# Patient Record
Sex: MALE | Race: WHITE | NOT HISPANIC OR LATINO | Employment: OTHER | URBAN - NONMETROPOLITAN AREA
[De-identification: names, ages, dates, MRNs, and addresses within clinical notes are randomized per-mention and may not be internally consistent; named-entity substitution may affect disease eponyms.]

---

## 2023-03-01 PROBLEM — I20.9 ANGINAL PAIN (CMS-HCC): Status: ACTIVE | Noted: 2023-03-01

## 2023-03-01 PROBLEM — R09.1 PLEURISY: Status: ACTIVE | Noted: 2023-03-01

## 2023-03-01 PROBLEM — K44.9 HIATAL HERNIA: Status: ACTIVE | Noted: 2023-03-01

## 2023-03-01 PROBLEM — M54.50 LOW BACK PAIN: Status: ACTIVE | Noted: 2023-03-01

## 2023-03-01 PROBLEM — I50.20 SYSTOLIC HEART FAILURE, ACC/AHA STAGE C (MULTI): Status: ACTIVE | Noted: 2023-03-01

## 2023-03-01 PROBLEM — R09.81 SINUS CONGESTION: Status: ACTIVE | Noted: 2023-03-01

## 2023-03-01 PROBLEM — R09.89 CAROTID BRUIT: Status: ACTIVE | Noted: 2023-03-01

## 2023-03-01 PROBLEM — M46.1 SACROILIITIS (CMS-HCC): Status: ACTIVE | Noted: 2023-03-01

## 2023-03-01 PROBLEM — M54.9 BACK PAIN: Status: ACTIVE | Noted: 2023-03-01

## 2023-03-01 PROBLEM — K51.90 ULCERATIVE COLITIS (MULTI): Status: ACTIVE | Noted: 2023-03-01

## 2023-03-01 PROBLEM — M25.552 LEFT HIP PAIN: Status: ACTIVE | Noted: 2023-03-01

## 2023-03-01 PROBLEM — K40.90 RIGHT INGUINAL HERNIA: Status: ACTIVE | Noted: 2023-03-01

## 2023-03-01 PROBLEM — M45.9 ANKYLOSING SPONDYLITIS (MULTI): Status: ACTIVE | Noted: 2023-03-01

## 2023-03-01 PROBLEM — M70.61 TROCHANTERIC BURSITIS OF BOTH HIPS: Status: ACTIVE | Noted: 2023-03-01

## 2023-03-01 PROBLEM — M47.812 CERVICAL SPONDYLOSIS: Status: ACTIVE | Noted: 2023-03-01

## 2023-03-01 PROBLEM — K64.4 ANAL SKIN TAG: Status: ACTIVE | Noted: 2023-03-01

## 2023-03-01 PROBLEM — M70.62 TROCHANTERIC BURSITIS OF BOTH HIPS: Status: ACTIVE | Noted: 2023-03-01

## 2023-03-01 PROBLEM — R17 YELLOW SKIN: Status: ACTIVE | Noted: 2023-03-01

## 2023-03-01 PROBLEM — T73.3XXA FATIGUE DUE TO EXCESSIVE EXERTION: Status: ACTIVE | Noted: 2023-03-01

## 2023-03-01 PROBLEM — E55.9 VITAMIN D DEFICIENCY: Status: ACTIVE | Noted: 2023-03-01

## 2023-03-01 PROBLEM — I35.1 MODERATE AORTIC REGURGITATION: Status: ACTIVE | Noted: 2023-03-01

## 2023-03-01 PROBLEM — K52.9 INFLAMMATORY BOWEL DISEASES (IBD): Status: ACTIVE | Noted: 2023-03-01

## 2023-03-01 PROBLEM — M16.11 ARTHRITIS OF RIGHT HIP: Status: ACTIVE | Noted: 2023-03-01

## 2023-03-01 PROBLEM — R94.39 ABNORMAL STRESS TEST: Status: ACTIVE | Noted: 2023-03-01

## 2023-03-01 PROBLEM — J44.1 COPD EXACERBATION (MULTI): Status: ACTIVE | Noted: 2023-03-01

## 2023-03-01 PROBLEM — G47.33 OBSTRUCTIVE SLEEP APNEA: Status: ACTIVE | Noted: 2023-03-01

## 2023-03-01 PROBLEM — J44.9 CHRONIC OBSTRUCTIVE PULMONARY DISEASE (MULTI): Status: ACTIVE | Noted: 2023-03-01

## 2023-03-01 PROBLEM — S32.050A COMPRESSION FRACTURE OF L5 VERTEBRA (MULTI): Status: ACTIVE | Noted: 2023-03-01

## 2023-03-01 PROBLEM — Z95.810 PRESENCE OF CARDIAC DEFIBRILLATOR: Status: ACTIVE | Noted: 2023-03-01

## 2023-03-01 PROBLEM — R93.2 ABNORMAL GALLBLADDER ULTRASOUND: Status: ACTIVE | Noted: 2023-03-01

## 2023-03-01 PROBLEM — E78.00 HYPERCHOLESTEROLEMIA: Status: ACTIVE | Noted: 2023-03-01

## 2023-03-01 PROBLEM — I50.20: Status: ACTIVE | Noted: 2023-03-01

## 2023-03-01 PROBLEM — S32.049A L4 VERTEBRAL FRACTURE (MULTI): Status: ACTIVE | Noted: 2023-03-01

## 2023-03-01 PROBLEM — I71.9 AORTIC ANEURYSM (CMS-HCC): Status: ACTIVE | Noted: 2023-03-01

## 2023-03-01 PROBLEM — K43.9 VENTRAL HERNIA: Status: ACTIVE | Noted: 2023-03-01

## 2023-03-01 PROBLEM — R05.9 COUGH: Status: ACTIVE | Noted: 2023-03-01

## 2023-03-01 PROBLEM — I25.5 ISCHEMIC CARDIOMYOPATHY: Status: ACTIVE | Noted: 2023-03-01

## 2023-03-01 PROBLEM — M62.08 RECTUS DIASTASIS: Status: ACTIVE | Noted: 2023-03-01

## 2023-03-01 PROBLEM — M41.20 IDIOPATHIC SCOLIOSIS IN ADULT PATIENT: Status: ACTIVE | Noted: 2023-03-01

## 2023-03-01 PROBLEM — R06.02 SOB (SHORTNESS OF BREATH) ON EXERTION: Status: ACTIVE | Noted: 2023-03-01

## 2023-03-01 PROBLEM — K21.9 GERD (GASTROESOPHAGEAL REFLUX DISEASE): Status: ACTIVE | Noted: 2023-03-01

## 2023-03-01 PROBLEM — I47.20 VENTRICULAR TACHYCARDIA (MULTI): Status: ACTIVE | Noted: 2023-03-01

## 2023-03-01 PROBLEM — I10 HTN (HYPERTENSION), BENIGN: Status: ACTIVE | Noted: 2023-03-01

## 2023-03-01 PROBLEM — R10.9 ABDOMINAL PAIN: Status: ACTIVE | Noted: 2023-03-01

## 2023-03-01 PROBLEM — M25.551 RIGHT HIP PAIN: Status: ACTIVE | Noted: 2023-03-01

## 2023-03-01 PROBLEM — M76.31 ILIOTIBIAL BAND SYNDROME OF RIGHT SIDE: Status: ACTIVE | Noted: 2023-03-01

## 2023-03-01 PROBLEM — M25.559 HIP PAIN: Status: ACTIVE | Noted: 2023-03-01

## 2023-03-01 PROBLEM — M48.02 DEGENERATIVE CERVICAL SPINAL STENOSIS: Status: ACTIVE | Noted: 2023-03-01

## 2023-03-01 PROBLEM — R09.89 CHEST CONGESTION: Status: ACTIVE | Noted: 2023-03-01

## 2023-03-01 PROBLEM — Z95.810 S/P ICD (INTERNAL CARDIAC DEFIBRILLATOR) PROCEDURE: Status: ACTIVE | Noted: 2023-03-01

## 2023-03-01 PROBLEM — I25.10 CAD (CORONARY ARTERY DISEASE): Status: ACTIVE | Noted: 2023-03-01

## 2023-03-01 PROBLEM — R73.01 FASTING HYPERGLYCEMIA: Status: ACTIVE | Noted: 2023-03-01

## 2023-03-01 RX ORDER — LIDOCAINE 50 MG/G
1 PATCH TOPICAL DAILY
COMMUNITY
End: 2024-04-17 | Stop reason: WASHOUT

## 2023-03-01 RX ORDER — CYCLOBENZAPRINE HCL 10 MG
10 TABLET ORAL 2 TIMES DAILY PRN
COMMUNITY
End: 2024-02-27 | Stop reason: WASHOUT

## 2023-03-01 RX ORDER — LANOLIN ALCOHOL/MO/W.PET/CERES
1 CREAM (GRAM) TOPICAL DAILY
COMMUNITY
Start: 2017-11-06

## 2023-03-01 RX ORDER — ALBUTEROL SULFATE 90 UG/1
2 AEROSOL, METERED RESPIRATORY (INHALATION) EVERY 6 HOURS
COMMUNITY
Start: 2016-04-29 | End: 2023-03-08 | Stop reason: SDUPTHER

## 2023-03-01 RX ORDER — ATORVASTATIN CALCIUM 80 MG/1
1 TABLET, FILM COATED ORAL NIGHTLY
COMMUNITY
Start: 2019-05-02 | End: 2024-02-26 | Stop reason: SDUPTHER

## 2023-03-01 RX ORDER — BUDESONIDE AND FORMOTEROL FUMARATE DIHYDRATE 160; 4.5 UG/1; UG/1
2 AEROSOL RESPIRATORY (INHALATION) 2 TIMES DAILY
COMMUNITY
Start: 2016-09-22 | End: 2024-04-17 | Stop reason: WASHOUT

## 2023-03-01 RX ORDER — SPIRONOLACTONE 25 MG/1
1 TABLET ORAL DAILY
COMMUNITY
Start: 2016-12-14

## 2023-03-01 RX ORDER — ALBUTEROL SULFATE 0.83 MG/ML
2.5 SOLUTION RESPIRATORY (INHALATION) EVERY 6 HOURS PRN
COMMUNITY
Start: 2022-02-07

## 2023-03-01 RX ORDER — GABAPENTIN 400 MG/1
400 CAPSULE ORAL 3 TIMES DAILY
COMMUNITY
End: 2024-02-27 | Stop reason: ALTCHOICE

## 2023-03-01 RX ORDER — POLYETHYLENE GLYCOL 3350 17 G/17G
17 POWDER, FOR SOLUTION ORAL AS NEEDED
COMMUNITY

## 2023-03-01 RX ORDER — DOCUSATE SODIUM 100 MG/1
1 CAPSULE, LIQUID FILLED ORAL 2 TIMES DAILY
COMMUNITY
Start: 2019-05-02 | End: 2023-09-06 | Stop reason: ALTCHOICE

## 2023-03-01 RX ORDER — TIOTROPIUM BROMIDE 18 UG/1
1 CAPSULE ORAL; RESPIRATORY (INHALATION) DAILY
COMMUNITY
Start: 2016-07-22

## 2023-03-01 RX ORDER — CETIRIZINE HYDROCHLORIDE 10 MG/1
1 TABLET ORAL DAILY
COMMUNITY

## 2023-03-01 RX ORDER — CALCIUM CARBONATE/VITAMIN D3 600MG-5MCG
1 TABLET ORAL DAILY
COMMUNITY
Start: 2019-05-02

## 2023-03-01 RX ORDER — TRIAMCINOLONE ACETONIDE 5 MG/G
1 CREAM TOPICAL 2 TIMES DAILY
COMMUNITY
Start: 2016-02-06 | End: 2024-02-27 | Stop reason: SDUPTHER

## 2023-03-01 RX ORDER — BUMETANIDE 1 MG/1
1 TABLET ORAL DAILY PRN
COMMUNITY
End: 2024-02-26 | Stop reason: SDUPTHER

## 2023-03-01 RX ORDER — CHOLECALCIFEROL (VITAMIN D3) 125 MCG
1 CAPSULE ORAL EVERY OTHER DAY
COMMUNITY

## 2023-03-01 RX ORDER — GUAIFENESIN 600 MG/1
1 TABLET, EXTENDED RELEASE ORAL AS NEEDED
COMMUNITY
Start: 2022-02-07

## 2023-03-01 RX ORDER — MULTIVITAMIN
1 TABLET ORAL DAILY
COMMUNITY

## 2023-03-01 RX ORDER — OMEPRAZOLE 20 MG/1
1 CAPSULE, DELAYED RELEASE ORAL DAILY
COMMUNITY
Start: 2016-02-05 | End: 2023-10-16 | Stop reason: SDUPTHER

## 2023-03-01 RX ORDER — NITROGLYCERIN 0.4 MG/1
1 TABLET SUBLINGUAL AS NEEDED
COMMUNITY
Start: 2019-05-02

## 2023-03-01 RX ORDER — LISINOPRIL 20 MG/1
1 TABLET ORAL DAILY
COMMUNITY
Start: 2021-10-26 | End: 2024-01-02 | Stop reason: SDUPTHER

## 2023-03-01 RX ORDER — L. ACIDOPHILUS/PECTIN, CITRUS 25MM-100MG
2 TABLET ORAL DAILY
COMMUNITY
Start: 2016-09-30 | End: 2023-10-16 | Stop reason: SDUPTHER

## 2023-03-01 RX ORDER — METOPROLOL SUCCINATE 100 MG/1
1 TABLET, EXTENDED RELEASE ORAL DAILY
COMMUNITY

## 2023-03-01 RX ORDER — CLOPIDOGREL BISULFATE 75 MG/1
1 TABLET ORAL DAILY
COMMUNITY
Start: 2019-05-02

## 2023-03-08 ENCOUNTER — OFFICE VISIT (OUTPATIENT)
Dept: PRIMARY CARE | Facility: CLINIC | Age: 63
End: 2023-03-08
Payer: MEDICARE

## 2023-03-08 VITALS
DIASTOLIC BLOOD PRESSURE: 76 MMHG | BODY MASS INDEX: 23.56 KG/M2 | SYSTOLIC BLOOD PRESSURE: 134 MMHG | WEIGHT: 138 LBS | HEART RATE: 80 BPM | HEIGHT: 64 IN

## 2023-03-08 DIAGNOSIS — M54.50 CHRONIC LOW BACK PAIN WITHOUT SCIATICA, UNSPECIFIED BACK PAIN LATERALITY: ICD-10-CM

## 2023-03-08 DIAGNOSIS — J44.9 CHRONIC OBSTRUCTIVE PULMONARY DISEASE, UNSPECIFIED COPD TYPE (MULTI): ICD-10-CM

## 2023-03-08 DIAGNOSIS — K51.00 ULCERATIVE PANCOLITIS WITHOUT COMPLICATION (MULTI): ICD-10-CM

## 2023-03-08 DIAGNOSIS — G89.29 CHRONIC LOW BACK PAIN WITHOUT SCIATICA, UNSPECIFIED BACK PAIN LATERALITY: ICD-10-CM

## 2023-03-08 DIAGNOSIS — M25.552 LEFT HIP PAIN: Primary | ICD-10-CM

## 2023-03-08 DIAGNOSIS — M45.0 ANKYLOSING SPONDYLITIS OF MULTIPLE SITES IN SPINE (MULTI): ICD-10-CM

## 2023-03-08 PROBLEM — I20.9 ANGINAL PAIN (CMS-HCC): Status: RESOLVED | Noted: 2023-03-01 | Resolved: 2023-03-08

## 2023-03-08 PROCEDURE — 99214 OFFICE O/P EST MOD 30 MIN: CPT | Performed by: INTERNAL MEDICINE

## 2023-03-08 PROCEDURE — 4004F PT TOBACCO SCREEN RCVD TLK: CPT | Performed by: INTERNAL MEDICINE

## 2023-03-08 PROCEDURE — 3075F SYST BP GE 130 - 139MM HG: CPT | Performed by: INTERNAL MEDICINE

## 2023-03-08 PROCEDURE — 3078F DIAST BP <80 MM HG: CPT | Performed by: INTERNAL MEDICINE

## 2023-03-08 RX ORDER — MINERAL OIL
180 ENEMA (ML) RECTAL ONCE AS NEEDED
COMMUNITY

## 2023-03-08 RX ORDER — TRIAMCINOLONE ACETONIDE 0.25 MG/G
OINTMENT TOPICAL
COMMUNITY
End: 2024-02-27 | Stop reason: WASHOUT

## 2023-03-08 RX ORDER — ERGOCALCIFEROL (VITAMIN D2) 10 MCG
5000 TABLET ORAL EVERY OTHER DAY
COMMUNITY
End: 2024-04-17 | Stop reason: SDUPTHER

## 2023-03-08 RX ORDER — PREDNISONE 10 MG/1
10 TABLET ORAL 3 TIMES DAILY
Qty: 15 TABLET | Refills: 0 | Status: SHIPPED | OUTPATIENT
Start: 2023-03-08 | End: 2023-03-13

## 2023-03-08 RX ORDER — ALBUTEROL SULFATE 90 UG/1
2 AEROSOL, METERED RESPIRATORY (INHALATION) EVERY 6 HOURS
Qty: 18 G | Refills: 11 | Status: SHIPPED | OUTPATIENT
Start: 2023-03-08

## 2023-03-08 RX ORDER — INFLIXIMAB 100 MG/10ML
100 INJECTION, POWDER, LYOPHILIZED, FOR SOLUTION INTRAVENOUS
COMMUNITY

## 2023-03-08 RX ORDER — AZITHROMYCIN 250 MG/1
250 TABLET, FILM COATED ORAL DAILY
COMMUNITY
Start: 2022-03-28 | End: 2023-09-13 | Stop reason: SDUPTHER

## 2023-03-08 RX ORDER — GABAPENTIN 300 MG/1
300 CAPSULE ORAL 3 TIMES DAILY
COMMUNITY
Start: 2023-02-15

## 2023-03-08 RX ORDER — PREDNISONE 10 MG/1
10 TABLET ORAL
COMMUNITY
Start: 2022-03-28 | End: 2023-03-08 | Stop reason: ALTCHOICE

## 2023-03-08 RX ORDER — DULOXETIN HYDROCHLORIDE 30 MG/1
30 CAPSULE, DELAYED RELEASE ORAL DAILY
COMMUNITY
Start: 2022-05-17 | End: 2024-02-27 | Stop reason: ALTCHOICE

## 2023-03-08 RX ORDER — SELENIUM 50 MCG
2 TABLET ORAL DAILY
COMMUNITY
End: 2024-02-27 | Stop reason: WASHOUT

## 2023-03-08 ASSESSMENT — ENCOUNTER SYMPTOMS
NUMBNESS: 0
SHORTNESS OF BREATH: 0
CONSTIPATION: 0
VOMITING: 0
LIGHT-HEADEDNESS: 0
DYSURIA: 0
MUSCULOSKELETAL NEGATIVE: 1
BACK PAIN: 0
ABDOMINAL PAIN: 0
HEMATOLOGIC/LYMPHATIC NEGATIVE: 1
ENDOCRINE NEGATIVE: 1
NECK STIFFNESS: 0
EYES NEGATIVE: 1
JOINT SWELLING: 0
PALPITATIONS: 0
CARDIOVASCULAR NEGATIVE: 1
PSYCHIATRIC NEGATIVE: 1
NAUSEA: 0
HEADACHES: 0
CONSTITUTIONAL NEGATIVE: 1
WHEEZING: 0
RESPIRATORY NEGATIVE: 1
ALLERGIC/IMMUNOLOGIC NEGATIVE: 1
AGITATION: 0
ABDOMINAL DISTENTION: 0
COUGH: 0
CHILLS: 0
NEUROLOGICAL NEGATIVE: 1
GASTROINTESTINAL NEGATIVE: 1
EYE DISCHARGE: 0
DIARRHEA: 0
ADENOPATHY: 0
CONFUSION: 0
FEVER: 0

## 2023-03-08 ASSESSMENT — PATIENT HEALTH QUESTIONNAIRE - PHQ9
1. LITTLE INTEREST OR PLEASURE IN DOING THINGS: NOT AT ALL
SUM OF ALL RESPONSES TO PHQ9 QUESTIONS 1 AND 2: 0
2. FEELING DOWN, DEPRESSED OR HOPELESS: NOT AT ALL

## 2023-03-08 NOTE — PROGRESS NOTES
Subjective   Patient ID: Horacio Gupta is a 62 y.o. male who presents for Follow-up (1 week follow up back pain and l hip bursitis ).  HERE FOR F/U STILL HAS LEFT HIP PAIN AND TAILBONE PAIN  THE LAST SHOT JUST HELPED FOR A FEW DAYS  RIGHT HIP IS OKNNOW        Review of Systems   Constitutional: Negative.  Negative for chills and fever.   HENT: Negative.  Negative for congestion.    Eyes: Negative.  Negative for discharge.   Respiratory: Negative.  Negative for cough, shortness of breath and wheezing.    Cardiovascular: Negative.  Negative for chest pain, palpitations and leg swelling.   Gastrointestinal: Negative.  Negative for abdominal distention, abdominal pain, constipation, diarrhea, nausea and vomiting.   Endocrine: Negative.    Genitourinary: Negative.  Negative for dysuria and urgency.   Musculoskeletal: Negative.  Negative for back pain, joint swelling and neck stiffness.   Skin: Negative.  Negative for rash.   Allergic/Immunologic: Negative.  Negative for immunocompromised state.   Neurological: Negative.  Negative for light-headedness, numbness and headaches.   Hematological: Negative.  Negative for adenopathy.   Psychiatric/Behavioral: Negative.  Negative for agitation, behavioral problems and confusion.    All other systems reviewed and are negative.      Objective   Physical Exam  Vitals reviewed.   Constitutional:       General: He is not in acute distress.     Appearance: Normal appearance.   HENT:      Head: Normocephalic and atraumatic.      Nose: Nose normal.   Eyes:      Conjunctiva/sclera: Conjunctivae normal.      Pupils: Pupils are equal, round, and reactive to light.   Neck:      Vascular: No carotid bruit.   Cardiovascular:      Rate and Rhythm: Normal rate and regular rhythm.      Pulses: Normal pulses.      Heart sounds:      No gallop.   Pulmonary:      Effort: Pulmonary effort is normal. No respiratory distress.      Breath sounds: Normal breath sounds. No wheezing.   Abdominal:  "     General: Bowel sounds are normal.      Palpations: Abdomen is soft.      Tenderness: There is no abdominal tenderness.   Musculoskeletal:         General: Tenderness present. No swelling. Normal range of motion.      Cervical back: Normal range of motion. No rigidity.      Comments: Tenderness left hip lateral and lower back   Lymphadenopathy:      Cervical: No cervical adenopathy.   Skin:     General: Skin is warm.      Findings: No rash.   Neurological:      General: No focal deficit present.      Mental Status: He is alert and oriented to person, place, and time.   Psychiatric:         Mood and Affect: Mood normal.         Behavior: Behavior normal.       /76 (BP Location: Left arm, Patient Position: Sitting)   Pulse 80   Ht 1.626 m (5' 4\")   Wt 62.6 kg (138 lb)   BMI 23.69 kg/m²    PSA   Date/Time Value Ref Range Status   01/31/2022 06:55 AM 1.29 0.00 - 4.00 ng/mL Final     Comment:     The FDA requires that the method used for PSA assay be   reported to the physician. Values obtained with different   assay methods must not be used interchangeably. This test  was performed at [facility] using the Access   Hybritech PSA assay is a two-site immunoenzymatic sandwich   assay. The assay is approved for measurement of   prostate-specific antigen (PSA)in serum and may be used   in conjunction with a digital rectal examination in men   50 years and older as an aid in detection of prostate   cancer.  5-Alpha-reductase inhibitors (e.g. Proscar, Finasteride,   Avodart, Dutasteride and Zahira) for the treatment of BPH   have been shown to lower PSA levels by an average of 50%   after 6 months of treatment.       Hemoglobin A1C   Date/Time Value Ref Range Status   08/17/2020 08:32 AM 6.3 % Final     Comment:          Diagnosis of Diabetes-Adults   Non-Diabetic: < or = 5.6%   Increased risk for developing diabetes: 5.7-6.4%   Diagnostic of diabetes: > or = 6.5%  .       Monitoring of Diabetes    "             Age (y)     Therapeutic Goal (%)   Adults:          >18           <7.0   Pediatrics:    13-18           <7.5                   7-12           <8.0                   0- 6            7.5-8.5   American Diabetes Association. Diabetes Care 33(S1), Jan 2010.       Assessment/Plan   Problem List Items Addressed This Visit          Respiratory    Chronic obstructive pulmonary disease (CMS/[facility])    Relevant Medications    albuterol 90 mcg/actuation inhaler    Other Relevant Orders    Follow Up In Primary Care       Digestive    Ulcerative colitis (CMS/[facility])     STABLE         Relevant Orders    Follow Up In Primary Care       Musculoskeletal    Ankylosing spondylitis (CMS/[facility])    Relevant Orders    Follow Up In Primary Care    Left hip pain - Primary    Relevant Medications    dexAMETHasone (Decadron) injection 4 mg (Start on 3/8/2023  4:00 PM)    predniSONE (Deltasone) 10 mg tablet    Other Relevant Orders    CT hip left wo IV contrast    Follow Up In Primary Care       Other    Low back pain    Relevant Medications    dexAMETHasone (Decadron) injection 4 mg (Start on 3/8/2023  4:00 PM)    predniSONE (Deltasone) 10 mg tablet    Other Relevant Orders    Follow Up In Primary Care      MDM    1) COMPLEXITY: 1 OR MORE CHRONIC CONDITION WITH EXACERBATION, OR PROGRESSION OR SIDE EFFECT OF TREATMENT ADDRESSED  2)DATA: TESTS INTERPRETED AND OR ORDERED, TOOK INDEPENDENT HISTORY OR RECORDS REVIEWED  3)RISK: MODERATE RISK DUE TO NATURE OF MEDICAL CONDITIONS/COMORBIDITY OR MEDICATIONS ORDERED OR SURGICAL OR PROCEDURE REFERRAL, .

## 2023-03-21 ENCOUNTER — OFFICE VISIT (OUTPATIENT)
Dept: PRIMARY CARE | Facility: CLINIC | Age: 63
End: 2023-03-21
Payer: MEDICARE

## 2023-03-21 VITALS
SYSTOLIC BLOOD PRESSURE: 114 MMHG | HEART RATE: 87 BPM | BODY MASS INDEX: 25.61 KG/M2 | WEIGHT: 150 LBS | DIASTOLIC BLOOD PRESSURE: 77 MMHG | HEIGHT: 64 IN

## 2023-03-21 DIAGNOSIS — J44.9 CHRONIC OBSTRUCTIVE PULMONARY DISEASE, UNSPECIFIED COPD TYPE (MULTI): ICD-10-CM

## 2023-03-21 DIAGNOSIS — M45.0 ANKYLOSING SPONDYLITIS OF MULTIPLE SITES IN SPINE (MULTI): ICD-10-CM

## 2023-03-21 DIAGNOSIS — G89.29 CHRONIC LOW BACK PAIN WITHOUT SCIATICA, UNSPECIFIED BACK PAIN LATERALITY: ICD-10-CM

## 2023-03-21 DIAGNOSIS — M25.552 LEFT HIP PAIN: ICD-10-CM

## 2023-03-21 DIAGNOSIS — K51.00 ULCERATIVE PANCOLITIS WITHOUT COMPLICATION (MULTI): ICD-10-CM

## 2023-03-21 DIAGNOSIS — J96.11 CHRONIC RESPIRATORY FAILURE WITH HYPOXIA (MULTI): Primary | ICD-10-CM

## 2023-03-21 DIAGNOSIS — M54.50 CHRONIC LOW BACK PAIN WITHOUT SCIATICA, UNSPECIFIED BACK PAIN LATERALITY: ICD-10-CM

## 2023-03-21 PROCEDURE — 99214 OFFICE O/P EST MOD 30 MIN: CPT | Performed by: INTERNAL MEDICINE

## 2023-03-21 PROCEDURE — 3074F SYST BP LT 130 MM HG: CPT | Performed by: INTERNAL MEDICINE

## 2023-03-21 PROCEDURE — 4004F PT TOBACCO SCREEN RCVD TLK: CPT | Performed by: INTERNAL MEDICINE

## 2023-03-21 PROCEDURE — 3078F DIAST BP <80 MM HG: CPT | Performed by: INTERNAL MEDICINE

## 2023-03-21 ASSESSMENT — ENCOUNTER SYMPTOMS
CONSTITUTIONAL NEGATIVE: 1
PALPITATIONS: 0
CONFUSION: 0
HEADACHES: 0
LIGHT-HEADEDNESS: 0
FEVER: 0
GASTROINTESTINAL NEGATIVE: 1
ABDOMINAL PAIN: 0
CONSTIPATION: 0
RESPIRATORY NEGATIVE: 1
DYSURIA: 0
ADENOPATHY: 0
ABDOMINAL DISTENTION: 0
JOINT SWELLING: 0
CARDIOVASCULAR NEGATIVE: 1
ENDOCRINE NEGATIVE: 1
ALLERGIC/IMMUNOLOGIC NEGATIVE: 1
SHORTNESS OF BREATH: 0
WHEEZING: 0
AGITATION: 0
PSYCHIATRIC NEGATIVE: 1
VOMITING: 0
MUSCULOSKELETAL NEGATIVE: 1
NEUROLOGICAL NEGATIVE: 1
NECK STIFFNESS: 0
COUGH: 0
EYE DISCHARGE: 0
DIARRHEA: 0
NAUSEA: 0
BACK PAIN: 0
EYES NEGATIVE: 1
NUMBNESS: 0
CHILLS: 0
HEMATOLOGIC/LYMPHATIC NEGATIVE: 1

## 2023-03-21 NOTE — PROGRESS NOTES
Subjective   Patient ID: Horacio Gupta is a 62 y.o. male who presents for Follow-up (2 WK FU PT CO LEFT HIP WANTS TO GIVE OUT).  HERE FOR FU FEELS BETTER, WALKS BETTER  GETTING PT AT HOME         Review of Systems   Constitutional: Negative.  Negative for chills and fever.   HENT: Negative.  Negative for congestion.    Eyes: Negative.  Negative for discharge.   Respiratory: Negative.  Negative for cough, shortness of breath and wheezing.    Cardiovascular: Negative.  Negative for chest pain, palpitations and leg swelling.   Gastrointestinal: Negative.  Negative for abdominal distention, abdominal pain, constipation, diarrhea, nausea and vomiting.   Endocrine: Negative.    Genitourinary: Negative.  Negative for dysuria and urgency.   Musculoskeletal: Negative.  Negative for back pain, joint swelling and neck stiffness.   Skin: Negative.  Negative for rash.   Allergic/Immunologic: Negative.  Negative for immunocompromised state.   Neurological: Negative.  Negative for light-headedness, numbness and headaches.   Hematological: Negative.  Negative for adenopathy.   Psychiatric/Behavioral: Negative.  Negative for agitation, behavioral problems and confusion.    All other systems reviewed and are negative.      Objective   Physical Exam  Vitals reviewed.   Constitutional:       General: He is not in acute distress.     Appearance: Normal appearance.   HENT:      Head: Normocephalic and atraumatic.      Nose: Nose normal.   Eyes:      Conjunctiva/sclera: Conjunctivae normal.      Pupils: Pupils are equal, round, and reactive to light.   Neck:      Vascular: No carotid bruit.   Cardiovascular:      Rate and Rhythm: Normal rate and regular rhythm.      Pulses: Normal pulses.      Heart sounds:      No gallop.   Pulmonary:      Effort: Pulmonary effort is normal. No respiratory distress.      Breath sounds: Normal breath sounds. No wheezing.   Abdominal:      General: Bowel sounds are normal.      Palpations: Abdomen  "is soft.      Tenderness: There is no abdominal tenderness.   Musculoskeletal:         General: Normal range of motion.      Cervical back: Normal range of motion. No rigidity.   Lymphadenopathy:      Cervical: No cervical adenopathy.   Skin:     General: Skin is warm.      Findings: No rash.   Neurological:      General: No focal deficit present.      Mental Status: He is alert and oriented to person, place, and time.   Psychiatric:         Mood and Affect: Mood normal.         Behavior: Behavior normal.       /77 (BP Location: Left arm, Patient Position: Sitting)   Pulse 87   Ht 1.626 m (5' 4\")   Wt 68 kg (150 lb)   BMI 25.75 kg/m²    PSA   Date/Time Value Ref Range Status   01/31/2022 06:55 AM 1.29 0.00 - 4.00 ng/mL Final     Comment:     The FDA requires that the method used for PSA assay be   reported to the physician. Values obtained with different   assay methods must not be used interchangeably. This test  was performed at [facility] using the Access   Hybritech PSA assay is a two-site immunoenzymatic sandwich   assay. The assay is approved for measurement of   prostate-specific antigen (PSA)in serum and may be used   in conjunction with a digital rectal examination in men   50 years and older as an aid in detection of prostate   cancer.  5-Alpha-reductase inhibitors (e.g. Proscar, Finasteride,   Avodart, Dutasteride and Zahira) for the treatment of BPH   have been shown to lower PSA levels by an average of 50%   after 6 months of treatment.       Hemoglobin A1C   Date/Time Value Ref Range Status   08/17/2020 08:32 AM 6.3 % Final     Comment:          Diagnosis of Diabetes-Adults   Non-Diabetic: < or = 5.6%   Increased risk for developing diabetes: 5.7-6.4%   Diagnostic of diabetes: > or = 6.5%  .       Monitoring of Diabetes                Age (y)     Therapeutic Goal (%)   Adults:          >18           <7.0   Pediatrics:    13-18           <7.5                   7-12           " <8.0                   0- 6            7.5-8.5   American Diabetes Association. Diabetes Care 33(S1), Jan 2010.       Assessment/Plan   Problem List Items Addressed This Visit          Respiratory    Chronic obstructive pulmonary disease (CMS/HCC)    Chronic respiratory failure with hypoxia (CMS/HCC) - Primary       Digestive    Ulcerative colitis (CMS/HCC)       Musculoskeletal    Ankylosing spondylitis (CMS/HCC)    Left hip pain    Relevant Orders    Referral to Orthopaedic Surgery       Other    Low back pain    CLINICALLY IMPROVING  CONT PT  ALL ASSESSED CHRONIC CONDITIONS ARE STABLE OR ADDRESSED.  CT CHANGES DISCUSSED WITH PT    MDM    1) COMPLEXITY: MORE THAN 1 STABLE CHRONIC CONDITION ADDRESSED  2)DATA: TESTS INTERPRETED AND OR ORDERED, TOOK INDEPENDENT HISTORY OR RECORDS REVIEWED  3)RISK: MODERATE RISK DUE TO NATURE OF MEDICAL CONDITIONS/COMORBIDITY OR MEDICATIONS ORDERED OR SURGICAL OR PROCEDURE REFERRAL, .

## 2023-04-03 ENCOUNTER — TELEPHONE (OUTPATIENT)
Dept: PRIMARY CARE | Facility: CLINIC | Age: 63
End: 2023-04-03
Payer: MEDICARE

## 2023-04-03 DIAGNOSIS — M45.9 ANKYLOSING SPONDYLITIS, UNSPECIFIED SITE OF SPINE (MULTI): Primary | ICD-10-CM

## 2023-04-18 ENCOUNTER — HOSPITAL ENCOUNTER (OUTPATIENT)
Dept: DATA CONVERSION | Facility: HOSPITAL | Age: 63
End: 2023-04-18
Attending: ANESTHESIOLOGY | Admitting: ANESTHESIOLOGY
Payer: MEDICARE

## 2023-04-18 DIAGNOSIS — M54.9 DORSALGIA, UNSPECIFIED: ICD-10-CM

## 2023-04-18 DIAGNOSIS — M54.17 RADICULOPATHY, LUMBOSACRAL REGION: ICD-10-CM

## 2023-04-18 DIAGNOSIS — F17.210 NICOTINE DEPENDENCE, CIGARETTES, UNCOMPLICATED: ICD-10-CM

## 2023-04-20 LAB
BASOPHILS (10*3/UL) IN BLOOD BY AUTOMATED COUNT: 0.04 X10E9/L (ref 0–0.1)
BASOPHILS/100 LEUKOCYTES IN BLOOD BY AUTOMATED COUNT: 0.5 % (ref 0–2)
C REACTIVE PROTEIN (MG/L) IN SER/PLAS: 0.93 MG/DL
EOSINOPHILS (10*3/UL) IN BLOOD BY AUTOMATED COUNT: 0.06 X10E9/L (ref 0–0.7)
EOSINOPHILS/100 LEUKOCYTES IN BLOOD BY AUTOMATED COUNT: 0.7 % (ref 0–6)
ERYTHROCYTE DISTRIBUTION WIDTH (RATIO) BY AUTOMATED COUNT: 14.2 % (ref 11.5–14.5)
ERYTHROCYTE MEAN CORPUSCULAR HEMOGLOBIN CONCENTRATION (G/DL) BY AUTOMATED: 32.1 G/DL (ref 32–36)
ERYTHROCYTE MEAN CORPUSCULAR VOLUME (FL) BY AUTOMATED COUNT: 99 FL (ref 80–100)
ERYTHROCYTES (10*6/UL) IN BLOOD BY AUTOMATED COUNT: 4.82 X10E12/L (ref 4.5–5.9)
HEMATOCRIT (%) IN BLOOD BY AUTOMATED COUNT: 47.7 % (ref 41–52)
HEMOGLOBIN (G/DL) IN BLOOD: 15.3 G/DL (ref 13.5–17.5)
IMMATURE GRANULOCYTES/100 LEUKOCYTES IN BLOOD BY AUTOMATED COUNT: 0.8 % (ref 0–0.9)
LEUKOCYTES (10*3/UL) IN BLOOD BY AUTOMATED COUNT: 8.5 X10E9/L (ref 4.4–11.3)
LYMPHOCYTES (10*3/UL) IN BLOOD BY AUTOMATED COUNT: 1.76 X10E9/L (ref 1.2–4.8)
LYMPHOCYTES/100 LEUKOCYTES IN BLOOD BY AUTOMATED COUNT: 20.8 % (ref 13–44)
MONOCYTES (10*3/UL) IN BLOOD BY AUTOMATED COUNT: 0.64 X10E9/L (ref 0.1–1)
MONOCYTES/100 LEUKOCYTES IN BLOOD BY AUTOMATED COUNT: 7.6 % (ref 2–10)
NEUTROPHILS (10*3/UL) IN BLOOD BY AUTOMATED COUNT: 5.89 X10E9/L (ref 1.2–7.7)
NEUTROPHILS/100 LEUKOCYTES IN BLOOD BY AUTOMATED COUNT: 69.6 % (ref 40–80)
PLATELETS (10*3/UL) IN BLOOD AUTOMATED COUNT: 257 X10E9/L (ref 150–450)
SEDIMENTATION RATE, ERYTHROCYTE: 19 MM/H (ref 0–20)

## 2023-05-15 ENCOUNTER — OFFICE VISIT (OUTPATIENT)
Dept: PRIMARY CARE | Facility: CLINIC | Age: 63
End: 2023-05-15
Payer: MEDICARE

## 2023-05-15 VITALS
SYSTOLIC BLOOD PRESSURE: 123 MMHG | WEIGHT: 153 LBS | HEART RATE: 82 BPM | DIASTOLIC BLOOD PRESSURE: 83 MMHG | BODY MASS INDEX: 26.12 KG/M2 | HEIGHT: 64 IN

## 2023-05-15 DIAGNOSIS — G89.29 CHRONIC LOW BACK PAIN WITHOUT SCIATICA, UNSPECIFIED BACK PAIN LATERALITY: ICD-10-CM

## 2023-05-15 DIAGNOSIS — M25.552 LEFT HIP PAIN: Primary | ICD-10-CM

## 2023-05-15 DIAGNOSIS — M54.50 CHRONIC LOW BACK PAIN WITHOUT SCIATICA, UNSPECIFIED BACK PAIN LATERALITY: ICD-10-CM

## 2023-05-15 DIAGNOSIS — M79.652 LEFT THIGH PAIN: ICD-10-CM

## 2023-05-15 DIAGNOSIS — Z00.00 HEALTHCARE MAINTENANCE: ICD-10-CM

## 2023-05-15 DIAGNOSIS — M45.0 ANKYLOSING SPONDYLITIS OF MULTIPLE SITES IN SPINE (MULTI): ICD-10-CM

## 2023-05-15 DIAGNOSIS — M70.62 TROCHANTERIC BURSITIS, LEFT HIP: ICD-10-CM

## 2023-05-15 PROCEDURE — 4004F PT TOBACCO SCREEN RCVD TLK: CPT | Performed by: INTERNAL MEDICINE

## 2023-05-15 PROCEDURE — 3079F DIAST BP 80-89 MM HG: CPT | Performed by: INTERNAL MEDICINE

## 2023-05-15 PROCEDURE — 3074F SYST BP LT 130 MM HG: CPT | Performed by: INTERNAL MEDICINE

## 2023-05-15 PROCEDURE — 99214 OFFICE O/P EST MOD 30 MIN: CPT | Performed by: INTERNAL MEDICINE

## 2023-05-15 RX ORDER — PNEUMOCOCCAL 20-VALENT CONJUGATE VACCINE 2.2; 2.2; 2.2; 2.2; 2.2; 2.2; 2.2; 2.2; 2.2; 2.2; 2.2; 2.2; 2.2; 2.2; 2.2; 2.2; 4.4; 2.2; 2.2; 2.2 UG/.5ML; UG/.5ML; UG/.5ML; UG/.5ML; UG/.5ML; UG/.5ML; UG/.5ML; UG/.5ML; UG/.5ML; UG/.5ML; UG/.5ML; UG/.5ML; UG/.5ML; UG/.5ML; UG/.5ML; UG/.5ML; UG/.5ML; UG/.5ML; UG/.5ML; UG/.5ML
0.5 INJECTION, SUSPENSION INTRAMUSCULAR ONCE
Qty: 0.5 ML | Refills: 0 | Status: SHIPPED | OUTPATIENT
Start: 2023-05-15 | End: 2023-05-15

## 2023-05-15 RX ORDER — DICLOFENAC SODIUM 10 MG/G
4 GEL TOPICAL 4 TIMES DAILY
Qty: 100 G | Refills: 3 | Status: SHIPPED | OUTPATIENT
Start: 2023-05-15 | End: 2023-08-09

## 2023-05-15 ASSESSMENT — PATIENT HEALTH QUESTIONNAIRE - PHQ9
2. FEELING DOWN, DEPRESSED OR HOPELESS: NOT AT ALL
SUM OF ALL RESPONSES TO PHQ9 QUESTIONS 1 AND 2: 0
1. LITTLE INTEREST OR PLEASURE IN DOING THINGS: NOT AT ALL

## 2023-05-15 ASSESSMENT — ENCOUNTER SYMPTOMS
BACK PAIN: 0
NAUSEA: 0
ABDOMINAL DISTENTION: 0
COUGH: 0
CHILLS: 0
ENDOCRINE NEGATIVE: 1
NEUROLOGICAL NEGATIVE: 1
FEVER: 0
JOINT SWELLING: 0
LIGHT-HEADEDNESS: 0
HEADACHES: 0
CONSTITUTIONAL NEGATIVE: 1
ALLERGIC/IMMUNOLOGIC NEGATIVE: 1
ADENOPATHY: 0
PALPITATIONS: 0
WHEEZING: 0
PSYCHIATRIC NEGATIVE: 1
ABDOMINAL PAIN: 0
SHORTNESS OF BREATH: 0
DYSURIA: 0
CONFUSION: 0
CARDIOVASCULAR NEGATIVE: 1
GASTROINTESTINAL NEGATIVE: 1
MUSCULOSKELETAL NEGATIVE: 1
NUMBNESS: 0
RESPIRATORY NEGATIVE: 1
CONSTIPATION: 0
NECK STIFFNESS: 0
EYES NEGATIVE: 1
EYE DISCHARGE: 0
HEMATOLOGIC/LYMPHATIC NEGATIVE: 1
AGITATION: 0
DIARRHEA: 0
VOMITING: 0

## 2023-05-15 NOTE — PROGRESS NOTES
Subjective   Patient ID: Horacio Gupta is a 62 y.o. male who presents for Follow-up (4 MO FU LAB NOT DONE).  HERE FOR FU  THE BACK PAIN IS BETTER  BUT STILL HAS LEFT HIP PAIN WITH RADIATION TO THE LEFT LE WITH NUMBNESS AND TINGLING        Review of Systems   Constitutional: Negative.  Negative for chills and fever.   HENT: Negative.  Negative for congestion.    Eyes: Negative.  Negative for discharge.   Respiratory: Negative.  Negative for cough, shortness of breath and wheezing.    Cardiovascular: Negative.  Negative for chest pain, palpitations and leg swelling.   Gastrointestinal: Negative.  Negative for abdominal distention, abdominal pain, constipation, diarrhea, nausea and vomiting.   Endocrine: Negative.    Genitourinary: Negative.  Negative for dysuria and urgency.   Musculoskeletal: Negative.  Negative for back pain, joint swelling and neck stiffness.   Skin: Negative.  Negative for rash.   Allergic/Immunologic: Negative.  Negative for immunocompromised state.   Neurological: Negative.  Negative for light-headedness, numbness and headaches.   Hematological: Negative.  Negative for adenopathy.   Psychiatric/Behavioral: Negative.  Negative for agitation, behavioral problems and confusion.    All other systems reviewed and are negative.      Objective   Physical Exam  Vitals reviewed.   Constitutional:       General: He is not in acute distress.     Appearance: Normal appearance.   HENT:      Head: Normocephalic and atraumatic.      Nose: Nose normal.   Eyes:      Conjunctiva/sclera: Conjunctivae normal.      Pupils: Pupils are equal, round, and reactive to light.   Neck:      Vascular: No carotid bruit.   Cardiovascular:      Rate and Rhythm: Normal rate and regular rhythm.      Pulses: Normal pulses.      Heart sounds:      No gallop.   Pulmonary:      Effort: Pulmonary effort is normal. No respiratory distress.      Breath sounds: Normal breath sounds. No wheezing.   Abdominal:      General: Bowel  "sounds are normal.      Palpations: Abdomen is soft.      Tenderness: There is no abdominal tenderness.   Musculoskeletal:         General: Tenderness present. Normal range of motion.      Cervical back: Normal range of motion. No rigidity.      Comments: LATERAL LEFT HIP AND LEFT THIGH   Lymphadenopathy:      Cervical: No cervical adenopathy.   Skin:     General: Skin is warm.      Findings: No rash.   Neurological:      General: No focal deficit present.      Mental Status: He is alert and oriented to person, place, and time.   Psychiatric:         Mood and Affect: Mood normal.         Behavior: Behavior normal.       /83 (BP Location: Left arm, Patient Position: Sitting)   Pulse 82   Ht 1.626 m (5' 4\")   Wt 69.4 kg (153 lb)   BMI 26.26 kg/m²    PSA   Date/Time Value Ref Range Status   01/31/2022 06:55 AM 1.29 0.00 - 4.00 ng/mL Final     Comment:     The FDA requires that the method used for PSA assay be   reported to the physician. Values obtained with different   assay methods must not be used interchangeably. This test  was performed at [facility] using the Access   Hybritech PSA assay is a two-site immunoenzymatic sandwich   assay. The assay is approved for measurement of   prostate-specific antigen (PSA)in serum and may be used   in conjunction with a digital rectal examination in men   50 years and older as an aid in detection of prostate   cancer.  5-Alpha-reductase inhibitors (e.g. Proscar, Finasteride,   Avodart, Dutasteride and Zahira) for the treatment of BPH   have been shown to lower PSA levels by an average of 50%   after 6 months of treatment.       Hemoglobin A1C   Date/Time Value Ref Range Status   08/17/2020 08:32 AM 6.3 % Final     Comment:          Diagnosis of Diabetes-Adults   Non-Diabetic: < or = 5.6%   Increased risk for developing diabetes: 5.7-6.4%   Diagnostic of diabetes: > or = 6.5%  .       Monitoring of Diabetes                Age (y)     Therapeutic Goal " (%)   Adults:          >18           <7.0   Pediatrics:    13-18           <7.5                   7-12           <8.0                   0- 6            7.5-8.5   American Diabetes Association. Diabetes Care 33(S1), Jan 2010.       Assessment/Plan   Problem List Items Addressed This Visit          Musculoskeletal    Ankylosing spondylitis (CMS/HCC)    Left hip pain - Primary    Relevant Medications    diclofenac sodium (Voltaren) 1 % gel gel       Other    Low back pain     Other Visit Diagnoses       Trochanteric bursitis, left hip        Relevant Medications    diclofenac sodium (Voltaren) 1 % gel gel    Left thigh pain        Relevant Medications    diclofenac sodium (Voltaren) 1 % gel gel    Healthcare maintenance        Relevant Medications    pneumoc 20-monalisa conj-dip cr,PF, (Prevnar 20, PF,) 0.5 mL vaccine             TRY V GEL    BONE SCAN DISCUSSED WITH  PT    REEVAL 1 MO      MDM    1) COMPLEXITY: 1 OR MORE CHRONIC CONDITION WITH EXACERBATION, OR PROGRESSION OR SIDE EFFECT OF TREATMENT ADDRESSED  2)DATA: TESTS INTERPRETED AND OR ORDERED, TOOK INDEPENDENT HISTORY OR RECORDS REVIEWED  3)RISK: MODERATE RISK DUE TO NATURE OF MEDICAL CONDITIONS/COMORBIDITY OR MEDICATIONS ORDERED OR SURGICAL OR PROCEDURE REFERRAL, .

## 2023-06-14 ENCOUNTER — OFFICE VISIT (OUTPATIENT)
Dept: PRIMARY CARE | Facility: CLINIC | Age: 63
End: 2023-06-14
Payer: MEDICARE

## 2023-06-14 VITALS
HEART RATE: 76 BPM | WEIGHT: 159 LBS | BODY MASS INDEX: 27.14 KG/M2 | SYSTOLIC BLOOD PRESSURE: 115 MMHG | DIASTOLIC BLOOD PRESSURE: 78 MMHG | HEIGHT: 64 IN

## 2023-06-14 DIAGNOSIS — M70.62 TROCHANTERIC BURSITIS OF BOTH HIPS: ICD-10-CM

## 2023-06-14 DIAGNOSIS — M45.0 ANKYLOSING SPONDYLITIS OF MULTIPLE SITES IN SPINE (MULTI): ICD-10-CM

## 2023-06-14 DIAGNOSIS — J96.11 CHRONIC RESPIRATORY FAILURE WITH HYPOXIA (MULTI): ICD-10-CM

## 2023-06-14 DIAGNOSIS — I10 HTN (HYPERTENSION), BENIGN: Primary | ICD-10-CM

## 2023-06-14 DIAGNOSIS — E55.9 VITAMIN D DEFICIENCY: ICD-10-CM

## 2023-06-14 DIAGNOSIS — I50.20 SYSTOLIC HEART FAILURE, ACC/AHA STAGE C (MULTI): ICD-10-CM

## 2023-06-14 DIAGNOSIS — R73.9 HYPERGLYCEMIA: ICD-10-CM

## 2023-06-14 DIAGNOSIS — M70.61 TROCHANTERIC BURSITIS OF BOTH HIPS: ICD-10-CM

## 2023-06-14 PROCEDURE — 3078F DIAST BP <80 MM HG: CPT | Performed by: INTERNAL MEDICINE

## 2023-06-14 PROCEDURE — 99214 OFFICE O/P EST MOD 30 MIN: CPT | Performed by: INTERNAL MEDICINE

## 2023-06-14 PROCEDURE — 4004F PT TOBACCO SCREEN RCVD TLK: CPT | Performed by: INTERNAL MEDICINE

## 2023-06-14 PROCEDURE — 3074F SYST BP LT 130 MM HG: CPT | Performed by: INTERNAL MEDICINE

## 2023-06-14 ASSESSMENT — ENCOUNTER SYMPTOMS
PSYCHIATRIC NEGATIVE: 1
WHEEZING: 0
ARTHRALGIAS: 1
JOINT SWELLING: 0
EYE DISCHARGE: 0
COUGH: 0
ENDOCRINE NEGATIVE: 1
NAUSEA: 0
PALPITATIONS: 0
CONFUSION: 0
CHILLS: 0
LIGHT-HEADEDNESS: 0
EYES NEGATIVE: 1
CONSTITUTIONAL NEGATIVE: 1
ABDOMINAL DISTENTION: 0
AGITATION: 0
NUMBNESS: 0
ABDOMINAL PAIN: 0
GASTROINTESTINAL NEGATIVE: 1
NEUROLOGICAL NEGATIVE: 1
BACK PAIN: 0
SHORTNESS OF BREATH: 0
DYSURIA: 0
FEVER: 0
NECK STIFFNESS: 0
HEMATOLOGIC/LYMPHATIC NEGATIVE: 1
CARDIOVASCULAR NEGATIVE: 1
CONSTIPATION: 0
VOMITING: 0
HEADACHES: 0
DIARRHEA: 0
RESPIRATORY NEGATIVE: 1
ALLERGIC/IMMUNOLOGIC NEGATIVE: 1
ADENOPATHY: 0

## 2023-06-14 ASSESSMENT — PATIENT HEALTH QUESTIONNAIRE - PHQ9
2. FEELING DOWN, DEPRESSED OR HOPELESS: NOT AT ALL
1. LITTLE INTEREST OR PLEASURE IN DOING THINGS: NOT AT ALL
SUM OF ALL RESPONSES TO PHQ9 QUESTIONS 1 AND 2: 0

## 2023-06-14 NOTE — PROGRESS NOTES
Subjective   Patient ID: Horacio Gupta is a 62 y.o. male who presents for Follow-up (1 MO FU TODAY).  HERE FOR FU  FEELS BETTER  VOLTAREN GEL HELPS A LOT        Review of Systems   Constitutional: Negative.  Negative for chills and fever.   HENT: Negative.  Negative for congestion.    Eyes: Negative.  Negative for discharge.   Respiratory: Negative.  Negative for cough, shortness of breath and wheezing.    Cardiovascular: Negative.  Negative for chest pain, palpitations and leg swelling.   Gastrointestinal: Negative.  Negative for abdominal distention, abdominal pain, constipation, diarrhea, nausea and vomiting.   Endocrine: Negative.    Genitourinary: Negative.  Negative for dysuria and urgency.   Musculoskeletal:  Positive for arthralgias. Negative for back pain, joint swelling and neck stiffness.   Skin: Negative.  Negative for rash.   Allergic/Immunologic: Negative.  Negative for immunocompromised state.   Neurological: Negative.  Negative for light-headedness, numbness and headaches.   Hematological: Negative.  Negative for adenopathy.   Psychiatric/Behavioral: Negative.  Negative for agitation, behavioral problems and confusion.    All other systems reviewed and are negative.      Objective   Physical Exam  Vitals reviewed.   Constitutional:       General: He is not in acute distress.     Appearance: Normal appearance.   HENT:      Head: Normocephalic and atraumatic.      Nose: Nose normal.   Eyes:      Conjunctiva/sclera: Conjunctivae normal.      Pupils: Pupils are equal, round, and reactive to light.   Neck:      Vascular: No carotid bruit.   Cardiovascular:      Rate and Rhythm: Normal rate and regular rhythm.      Pulses: Normal pulses.      Heart sounds:      No gallop.   Pulmonary:      Effort: Pulmonary effort is normal. No respiratory distress.      Breath sounds: Normal breath sounds. No wheezing.   Abdominal:      General: Bowel sounds are normal.      Palpations: Abdomen is soft.       "Tenderness: There is no abdominal tenderness.   Musculoskeletal:         General: Tenderness present. Normal range of motion.      Cervical back: Normal range of motion. No rigidity.      Right lower leg: Edema present.      Left lower leg: Edema present.      Comments: MILD EDEMA    LEFT HIP TENDERNESS   Lymphadenopathy:      Cervical: No cervical adenopathy.   Skin:     General: Skin is warm.      Findings: No rash.   Neurological:      General: No focal deficit present.      Mental Status: He is alert and oriented to person, place, and time.   Psychiatric:         Mood and Affect: Mood normal.         Behavior: Behavior normal.       /78 (BP Location: Left arm, Patient Position: Sitting)   Pulse 76   Ht 1.626 m (5' 4\")   Wt 72.1 kg (159 lb)   BMI 27.29 kg/m²    PSA   Date/Time Value Ref Range Status   01/31/2022 06:55 AM 1.29 0.00 - 4.00 ng/mL Final     Comment:     The FDA requires that the method used for PSA assay be   reported to the physician. Values obtained with different   assay methods must not be used interchangeably. This test  was performed at [facility] using the Access   Hybritech PSA assay is a two-site immunoenzymatic sandwich   assay. The assay is approved for measurement of   prostate-specific antigen (PSA)in serum and may be used   in conjunction with a digital rectal examination in men   50 years and older as an aid in detection of prostate   cancer.  5-Alpha-reductase inhibitors (e.g. Proscar, Finasteride,   Avodart, Dutasteride and Zahira) for the treatment of BPH   have been shown to lower PSA levels by an average of 50%   after 6 months of treatment.       Hemoglobin A1C   Date/Time Value Ref Range Status   08/17/2020 08:32 AM 6.3 % Final     Comment:          Diagnosis of Diabetes-Adults   Non-Diabetic: < or = 5.6%   Increased risk for developing diabetes: 5.7-6.4%   Diagnostic of diabetes: > or = 6.5%  .       Monitoring of Diabetes                Age (y)     " Therapeutic Goal (%)   Adults:          >18           <7.0   Pediatrics:    13-18           <7.5                   7-12           <8.0                   0- 6            7.5-8.5   American Diabetes Association. Diabetes Care 33(S1), Jan 2010.       Assessment/Plan   Problem List Items Addressed This Visit       Ankylosing spondylitis (CMS/HCC)    Relevant Orders    CBC and Auto Differential    Comprehensive Metabolic Panel    Sedimentation Rate    HTN (hypertension), benign - Primary    Relevant Orders    CBC and Auto Differential    Comprehensive Metabolic Panel    Systolic heart failure, ACC/AHA stage C (CMS/HCC)    Relevant Orders    CBC and Auto Differential    Comprehensive Metabolic Panel    Vitamin D deficiency    Relevant Orders    Vitamin D, Total    Trochanteric bursitis of both hips    Relevant Orders    CBC and Auto Differential    Comprehensive Metabolic Panel    Chronic respiratory failure with hypoxia (CMS/HCC)    Relevant Orders    CBC and Auto Differential    Comprehensive Metabolic Panel     Other Visit Diagnoses       Hyperglycemia        Relevant Orders    CBC and Auto Differential    Comprehensive Metabolic Panel    Hemoglobin A1C             CLINICALLY IMPROVING      MDM    1) COMPLEXITY: MORE THAN 1 STABLE CHRONIC CONDITION ADDRESSED  2)DATA: TESTS INTERPRETED AND OR ORDERED, TOOK INDEPENDENT HISTORY OR RECORDS REVIEWED  3)RISK: MODERATE RISK DUE TO NATURE OF MEDICAL CONDITIONS/COMORBIDITY OR MEDICATIONS ORDERED OR SURGICAL OR PROCEDURE REFERRAL, .

## 2023-08-09 DIAGNOSIS — M25.552 LEFT HIP PAIN: ICD-10-CM

## 2023-08-09 DIAGNOSIS — M79.652 LEFT THIGH PAIN: ICD-10-CM

## 2023-08-09 DIAGNOSIS — M70.62 TROCHANTERIC BURSITIS, LEFT HIP: ICD-10-CM

## 2023-08-09 RX ORDER — DICLOFENAC SODIUM 10 MG/G
GEL TOPICAL
Qty: 100 G | Refills: 0 | Status: SHIPPED | OUTPATIENT
Start: 2023-08-09 | End: 2024-02-27 | Stop reason: SDUPTHER

## 2023-09-06 ENCOUNTER — TELEMEDICINE (OUTPATIENT)
Dept: PRIMARY CARE | Facility: CLINIC | Age: 63
End: 2023-09-06
Payer: MEDICARE

## 2023-09-06 DIAGNOSIS — R06.02 SOB (SHORTNESS OF BREATH) ON EXERTION: ICD-10-CM

## 2023-09-06 DIAGNOSIS — R10.33 ABDOMINAL PAIN, PERIUMBILICAL: ICD-10-CM

## 2023-09-06 DIAGNOSIS — R51.9 ACUTE NONINTRACTABLE HEADACHE, UNSPECIFIED HEADACHE TYPE: ICD-10-CM

## 2023-09-06 DIAGNOSIS — J01.90 ACUTE NON-RECURRENT SINUSITIS, UNSPECIFIED LOCATION: ICD-10-CM

## 2023-09-06 DIAGNOSIS — R19.7 DIARRHEA, UNSPECIFIED TYPE: Primary | ICD-10-CM

## 2023-09-06 PROCEDURE — 99214 OFFICE O/P EST MOD 30 MIN: CPT | Performed by: INTERNAL MEDICINE

## 2023-09-06 RX ORDER — CETIRIZINE HYDROCHLORIDE 10 MG/1
10 TABLET ORAL ONCE AS NEEDED
COMMUNITY
Start: 2023-02-13 | End: 2024-04-17 | Stop reason: SDUPTHER

## 2023-09-06 RX ORDER — ALBUTEROL SULFATE 0.83 MG/ML
SOLUTION RESPIRATORY (INHALATION)
COMMUNITY
Start: 2023-02-13 | End: 2024-04-17 | Stop reason: SDUPTHER

## 2023-09-06 RX ORDER — METRONIDAZOLE 500 MG/1
500 TABLET ORAL 3 TIMES DAILY
Qty: 21 TABLET | Refills: 0 | Status: SHIPPED | OUTPATIENT
Start: 2023-09-06 | End: 2023-09-13

## 2023-09-06 RX ORDER — LORAZEPAM 1 MG/1
TABLET ORAL
COMMUNITY
Start: 2023-03-31 | End: 2024-02-27 | Stop reason: ALTCHOICE

## 2023-09-06 RX ORDER — METHYLPREDNISOLONE 4 MG/1
TABLET ORAL
Qty: 21 TABLET | Refills: 0 | Status: SHIPPED | OUTPATIENT
Start: 2023-09-06 | End: 2023-10-16 | Stop reason: ALTCHOICE

## 2023-09-06 RX ORDER — DOXYCYCLINE 100 MG/1
100 CAPSULE ORAL 2 TIMES DAILY
Qty: 14 CAPSULE | Refills: 0 | Status: SHIPPED | OUTPATIENT
Start: 2023-09-06 | End: 2023-09-13

## 2023-09-06 RX ORDER — TRAMADOL HYDROCHLORIDE 50 MG/1
TABLET ORAL
COMMUNITY
Start: 2023-03-31 | End: 2024-02-27 | Stop reason: ALTCHOICE

## 2023-09-06 ASSESSMENT — ENCOUNTER SYMPTOMS
ABDOMINAL PAIN: 1
COUGH: 0
WHEEZING: 0
FEVER: 0
DYSURIA: 0
WEAKNESS: 0
EYES NEGATIVE: 1
ABDOMINAL DISTENTION: 0
MUSCULOSKELETAL NEGATIVE: 1
NAUSEA: 0
DIZZINESS: 0
HEADACHES: 1
DIARRHEA: 1
CHILLS: 0
CONSTIPATION: 0
AGITATION: 0
PALPITATIONS: 0
ENDOCRINE NEGATIVE: 1
SHORTNESS OF BREATH: 1
PSYCHIATRIC NEGATIVE: 1
RHINORRHEA: 0
CARDIOVASCULAR NEGATIVE: 1
BACK PAIN: 0
LIGHT-HEADEDNESS: 0
VOMITING: 0
SINUS PRESSURE: 1

## 2023-09-06 NOTE — PROGRESS NOTES
Subjective   Patient ID: Horacio Gupta is a 62 y.o. male who presents for Shortness of Breath (Headache, runny nose, sinus congestion, sore throat, diarrhea, been using oxygen x 1 wk covid negative).  HPI  VIRTUAL VISIT  SOB ON EXERTION WITH SINUS CONGESTION AND HEADACHE 6/10 WITH DIARRHEA X 1 WEEK . HAS PERIUMBILICAL ABDOMINAL PAIN 4/10 ON AND OFF . HAD NEGATIVE HOME COVID TEST.  Review of Systems   Constitutional:  Negative for chills and fever.   HENT:  Positive for congestion and sinus pressure. Negative for postnasal drip and rhinorrhea.    Eyes: Negative.  Negative for visual disturbance.   Respiratory:  Positive for shortness of breath. Negative for cough and wheezing.    Cardiovascular: Negative.  Negative for chest pain, palpitations and leg swelling.   Gastrointestinal:  Positive for abdominal pain and diarrhea. Negative for abdominal distention, constipation, nausea and vomiting.        PERIUMBILICAL ABDOMINAL PAIN   Endocrine: Negative.    Genitourinary:  Negative for dysuria and urgency.   Musculoskeletal: Negative.  Negative for back pain.   Skin: Negative.  Negative for rash.   Allergic/Immunologic: Negative for immunocompromised state.   Neurological:  Positive for headaches. Negative for dizziness, weakness and light-headedness.   Psychiatric/Behavioral: Negative.  Negative for agitation.        Objective   Physical Exam  Constitutional:       General: He is not in acute distress.     Appearance: He is not ill-appearing.   Neurological:      Mental Status: He is alert.         Assessment/Plan   1. Diarrhea, unspecified type  methylPREDNISolone (Medrol Dospak) 4 mg tablets    metroNIDAZOLE (Flagyl) 500 mg tablet    Rotavirus antigen, stool    Cryptosporidium antigen, stool    C. difficile, PCR      2. Abdominal pain, periumbilical        3. Acute non-recurrent sinusitis, unspecified location  methylPREDNISolone (Medrol Dospak) 4 mg tablets    doxycycline (Vibramycin) 100 mg capsule      4.  Acute nonintractable headache, unspecified headache type  methylPREDNISolone (Medrol Dospak) 4 mg tablets      5. SOB (shortness of breath) on exertion          PT. WAS INSTRUCTED TO INCREASE FLUID INTAKE ,TAKE TYLENOL 650 MG PO Q6H/PRN FOR PAIN OR FEVER AND TAKE ROBITUSSIN OTC 2 TSP Q 6H/PRN FOR COUGH.'  ADVISED TO ELEVATE THE HEAD OF THE BED FOR SLEEP AND TO SLEEP WITH WARM HUMIDIFIER.  ADVISED FOR SMALLER MEAL PORTIONS MORE FREQUENT SERVINGS AND TO HAVE MORE GATORADE ,AND TO HAVE LOW FAT DIET. CUT BACK ON CAFFEINE.  ADVISED FOR FREQUENT HANDWASHING.    1 WEEK (VIRTUAL)  NEEDS STOOL CONTAINER (FAMILY WILL PICK IT UP).

## 2023-09-07 ENCOUNTER — LAB (OUTPATIENT)
Dept: LAB | Facility: LAB | Age: 63
End: 2023-09-07
Payer: MEDICARE

## 2023-09-07 VITALS — BODY MASS INDEX: 18.96 KG/M2 | WEIGHT: 120.81 LBS | HEIGHT: 67 IN

## 2023-09-07 DIAGNOSIS — R19.7 DIARRHEA, UNSPECIFIED TYPE: ICD-10-CM

## 2023-09-13 ENCOUNTER — TELEMEDICINE (OUTPATIENT)
Dept: PRIMARY CARE | Facility: CLINIC | Age: 63
End: 2023-09-13
Payer: MEDICARE

## 2023-09-13 DIAGNOSIS — R53.83 FATIGUE, UNSPECIFIED TYPE: ICD-10-CM

## 2023-09-13 DIAGNOSIS — E55.9 VITAMIN D DEFICIENCY: ICD-10-CM

## 2023-09-13 DIAGNOSIS — J44.1 COPD EXACERBATION (MULTI): Primary | ICD-10-CM

## 2023-09-13 PROCEDURE — 99214 OFFICE O/P EST MOD 30 MIN: CPT | Performed by: INTERNAL MEDICINE

## 2023-09-13 RX ORDER — AZITHROMYCIN 250 MG/1
250 TABLET, FILM COATED ORAL DAILY
Qty: 6 TABLET | Refills: 0 | Status: SHIPPED | OUTPATIENT
Start: 2023-09-13 | End: 2023-10-16 | Stop reason: ALTCHOICE

## 2023-09-13 RX ORDER — PREDNISONE 10 MG/1
10 TABLET ORAL 3 TIMES DAILY
Qty: 15 TABLET | Refills: 0 | Status: SHIPPED | OUTPATIENT
Start: 2023-09-13 | End: 2023-09-18

## 2023-09-13 ASSESSMENT — ENCOUNTER SYMPTOMS
FATIGUE: 1
ABDOMINAL DISTENTION: 0
NUMBNESS: 0
HEMATOLOGIC/LYMPHATIC NEGATIVE: 1
COUGH: 0
ALLERGIC/IMMUNOLOGIC NEGATIVE: 1
EYES NEGATIVE: 1
CONFUSION: 0
ENDOCRINE NEGATIVE: 1
NAUSEA: 0
DYSURIA: 0
SHORTNESS OF BREATH: 1
SINUS PAIN: 1
MUSCULOSKELETAL NEGATIVE: 1
CARDIOVASCULAR NEGATIVE: 1
ABDOMINAL PAIN: 0
BACK PAIN: 0
SINUS PRESSURE: 1
ADENOPATHY: 0
EYE DISCHARGE: 0
AGITATION: 0
JOINT SWELLING: 0
FEVER: 0
DIARRHEA: 0
RHINORRHEA: 1
CHILLS: 0
VOMITING: 0
PSYCHIATRIC NEGATIVE: 1
WHEEZING: 1
NEUROLOGICAL NEGATIVE: 1
HEADACHES: 0
CONSTIPATION: 0
LIGHT-HEADEDNESS: 0
NECK STIFFNESS: 0
PALPITATIONS: 0
GASTROINTESTINAL NEGATIVE: 1

## 2023-09-13 NOTE — PROGRESS NOTES
Subjective   Patient ID: Horacio Gupta is a 62 y.o. male who presents for Follow-up (1 WK FU PT CO FEELING VERY RUN DOWN AND HARD TO BREATH SINUS DRAINAGE DISCUSS STOOL SAMPLE AND WANTS REFERRAL FOR WATER THERAPY).  CO NASAL DRAINAGE, URENA, TREMOR, FATIGUE AND TIRED  NO FEVER HA  TESTED HIS SELF NEGATIVE FOR COVID        Review of Systems   Constitutional:  Positive for fatigue. Negative for chills and fever.   HENT:  Positive for rhinorrhea, sinus pressure and sinus pain. Negative for congestion.    Eyes: Negative.  Negative for discharge.   Respiratory:  Positive for shortness of breath (WITH EXERION) and wheezing. Negative for cough.    Cardiovascular: Negative.  Negative for chest pain, palpitations and leg swelling.   Gastrointestinal: Negative.  Negative for abdominal distention, abdominal pain, constipation, diarrhea, nausea and vomiting.   Endocrine: Negative.    Genitourinary: Negative.  Negative for dysuria and urgency.   Musculoskeletal: Negative.  Negative for back pain, joint swelling and neck stiffness.   Skin: Negative.  Negative for rash.   Allergic/Immunologic: Negative.  Negative for immunocompromised state.   Neurological: Negative.  Negative for light-headedness, numbness and headaches.   Hematological: Negative.  Negative for adenopathy.   Psychiatric/Behavioral: Negative.  Negative for agitation, behavioral problems and confusion.    All other systems reviewed and are negative.      Objective   Physical Exam  There were no vitals taken for this visit.   PSA   Date/Time Value Ref Range Status   01/31/2022 06:55 AM 1.29 0.00 - 4.00 ng/mL Final     Comment:     The FDA requires that the method used for PSA assay be   reported to the physician. Values obtained with different   assay methods must not be used interchangeably. This test  was performed at [facility] using the Access   Hybritech PSA assay is a two-site immunoenzymatic sandwich   assay. The assay is approved for  measurement of   prostate-specific antigen (PSA)in serum and may be used   in conjunction with a digital rectal examination in men   50 years and older as an aid in detection of prostate   cancer.  5-Alpha-reductase inhibitors (e.g. Proscar, Finasteride,   Avodart, Dutasteride and Zahira) for the treatment of BPH   have been shown to lower PSA levels by an average of 50%   after 6 months of treatment.       Hemoglobin A1C   Date/Time Value Ref Range Status   08/17/2020 08:32 AM 6.3 % Final     Comment:          Diagnosis of Diabetes-Adults   Non-Diabetic: < or = 5.6%   Increased risk for developing diabetes: 5.7-6.4%   Diagnostic of diabetes: > or = 6.5%  .       Monitoring of Diabetes                Age (y)     Therapeutic Goal (%)   Adults:          >18           <7.0   Pediatrics:    13-18           <7.5                   7-12           <8.0                   0- 6            7.5-8.5   American Diabetes Association. Diabetes Care 33(S1), Jan 2010.       Assessment/Plan   Problem List Items Addressed This Visit       COPD exacerbation (CMS/[facility]) - Primary    Relevant Medications    azithromycin (Zithromax) 250 mg tablet    predniSONE (Deltasone) 10 mg tablet    Other Relevant Orders    CBC and Auto Differential    Comprehensive Metabolic Panel    TSH with reflex to Free T4 if abnormal    XR chest 2 views    Vitamin D deficiency    Relevant Orders    Vitamin D 25-Hydroxy,Total (for eval of Vitamin D levels)     Other Visit Diagnoses       Fatigue, unspecified type        Relevant Orders    TSH with reflex to Free T4 if abnormal             PT. WAS INSTRUCTED TO INCREASE FLUID INTAKE ,TAKE TYLENOL 650 MG PO Q6H/PRN FOR PAIN OR FEVER AND TAKE ROBITUSSIN OTC 2 TSP Q 6H/PRN FOR COUGH.      MDM    1) COMPLEXITY: 1 OR MORE CHRONIC CONDITION WITH EXACERBATION, OR PROGRESSION OR SIDE EFFECT OF TREATMENT ADDRESSED  2)DATA: TESTS INTERPRETED AND OR ORDERED, TOOK INDEPENDENT HISTORY OR RECORDS REVIEWED  3)RISK: MODERATE RISK DUE  TO NATURE OF MEDICAL CONDITIONS/COMORBIDITY OR MEDICATIONS ORDERED OR SURGICAL OR PROCEDURE REFERRAL, .      FU 1 WEEK IN OFFICE

## 2023-09-14 ENCOUNTER — LAB (OUTPATIENT)
Dept: LAB | Facility: LAB | Age: 63
End: 2023-09-14
Payer: MEDICARE

## 2023-09-14 DIAGNOSIS — J44.1 COPD EXACERBATION (MULTI): ICD-10-CM

## 2023-09-14 DIAGNOSIS — E55.9 VITAMIN D DEFICIENCY: ICD-10-CM

## 2023-09-14 DIAGNOSIS — R53.83 FATIGUE, UNSPECIFIED TYPE: ICD-10-CM

## 2023-09-14 LAB
ALANINE AMINOTRANSFERASE (SGPT) (U/L) IN SER/PLAS: 30 U/L (ref 10–52)
ALBUMIN (G/DL) IN SER/PLAS: 4.1 G/DL (ref 3.4–5)
ALKALINE PHOSPHATASE (U/L) IN SER/PLAS: 49 U/L (ref 33–136)
ANION GAP IN SER/PLAS: 12 MMOL/L (ref 10–20)
ASPARTATE AMINOTRANSFERASE (SGOT) (U/L) IN SER/PLAS: 30 U/L (ref 9–39)
BASOPHILS (10*3/UL) IN BLOOD BY AUTOMATED COUNT: 0.03 X10E9/L (ref 0–0.1)
BASOPHILS/100 LEUKOCYTES IN BLOOD BY AUTOMATED COUNT: 0.3 % (ref 0–2)
BILIRUBIN TOTAL (MG/DL) IN SER/PLAS: 0.4 MG/DL (ref 0–1.2)
CALCIDIOL (25 OH VITAMIN D3) (NG/ML) IN SER/PLAS: 61 NG/ML
CALCIUM (MG/DL) IN SER/PLAS: 9.3 MG/DL (ref 8.6–10.3)
CARBON DIOXIDE, TOTAL (MMOL/L) IN SER/PLAS: 26 MMOL/L (ref 21–32)
CHLORIDE (MMOL/L) IN SER/PLAS: 103 MMOL/L (ref 98–107)
CREATININE (MG/DL) IN SER/PLAS: 0.86 MG/DL (ref 0.5–1.3)
EOSINOPHILS (10*3/UL) IN BLOOD BY AUTOMATED COUNT: 0.04 X10E9/L (ref 0–0.7)
EOSINOPHILS/100 LEUKOCYTES IN BLOOD BY AUTOMATED COUNT: 0.5 % (ref 0–6)
ERYTHROCYTE DISTRIBUTION WIDTH (RATIO) BY AUTOMATED COUNT: 14.3 % (ref 11.5–14.5)
ERYTHROCYTE MEAN CORPUSCULAR HEMOGLOBIN CONCENTRATION (G/DL) BY AUTOMATED: 33.3 G/DL (ref 32–36)
ERYTHROCYTE MEAN CORPUSCULAR VOLUME (FL) BY AUTOMATED COUNT: 97 FL (ref 80–100)
ERYTHROCYTES (10*6/UL) IN BLOOD BY AUTOMATED COUNT: 5.08 X10E12/L (ref 4.5–5.9)
GFR MALE: >90 ML/MIN/1.73M2
GLUCOSE (MG/DL) IN SER/PLAS: 97 MG/DL (ref 74–99)
HEMATOCRIT (%) IN BLOOD BY AUTOMATED COUNT: 49.5 % (ref 41–52)
HEMOGLOBIN (G/DL) IN BLOOD: 16.5 G/DL (ref 13.5–17.5)
IMMATURE GRANULOCYTES/100 LEUKOCYTES IN BLOOD BY AUTOMATED COUNT: 0.6 % (ref 0–0.9)
LEUKOCYTES (10*3/UL) IN BLOOD BY AUTOMATED COUNT: 8.8 X10E9/L (ref 4.4–11.3)
LYMPHOCYTES (10*3/UL) IN BLOOD BY AUTOMATED COUNT: 1.98 X10E9/L (ref 1.2–4.8)
LYMPHOCYTES/100 LEUKOCYTES IN BLOOD BY AUTOMATED COUNT: 22.5 % (ref 13–44)
MONOCYTES (10*3/UL) IN BLOOD BY AUTOMATED COUNT: 0.84 X10E9/L (ref 0.1–1)
MONOCYTES/100 LEUKOCYTES IN BLOOD BY AUTOMATED COUNT: 9.6 % (ref 2–10)
NEUTROPHILS (10*3/UL) IN BLOOD BY AUTOMATED COUNT: 5.85 X10E9/L (ref 1.2–7.7)
NEUTROPHILS/100 LEUKOCYTES IN BLOOD BY AUTOMATED COUNT: 66.5 % (ref 40–80)
PLATELETS (10*3/UL) IN BLOOD AUTOMATED COUNT: 273 X10E9/L (ref 150–450)
POTASSIUM (MMOL/L) IN SER/PLAS: 4.5 MMOL/L (ref 3.5–5.3)
PROTEIN TOTAL: 6 G/DL (ref 6.4–8.2)
SODIUM (MMOL/L) IN SER/PLAS: 136 MMOL/L (ref 136–145)
THYROTROPIN (MIU/L) IN SER/PLAS BY DETECTION LIMIT <= 0.05 MIU/L: 1.68 MIU/L (ref 0.44–3.98)
UREA NITROGEN (MG/DL) IN SER/PLAS: 11 MG/DL (ref 6–23)

## 2023-09-14 PROCEDURE — 85025 COMPLETE CBC W/AUTO DIFF WBC: CPT

## 2023-09-14 PROCEDURE — 82306 VITAMIN D 25 HYDROXY: CPT

## 2023-09-14 PROCEDURE — 84443 ASSAY THYROID STIM HORMONE: CPT

## 2023-09-14 PROCEDURE — 36415 COLL VENOUS BLD VENIPUNCTURE: CPT

## 2023-09-14 PROCEDURE — 80053 COMPREHEN METABOLIC PANEL: CPT

## 2023-09-19 ENCOUNTER — OFFICE VISIT (OUTPATIENT)
Dept: PRIMARY CARE | Facility: CLINIC | Age: 63
End: 2023-09-19
Payer: MEDICARE

## 2023-09-19 VITALS
HEART RATE: 76 BPM | BODY MASS INDEX: 25.61 KG/M2 | HEIGHT: 64 IN | DIASTOLIC BLOOD PRESSURE: 67 MMHG | WEIGHT: 150 LBS | SYSTOLIC BLOOD PRESSURE: 107 MMHG

## 2023-09-19 DIAGNOSIS — Z00.00 HEALTHCARE MAINTENANCE: ICD-10-CM

## 2023-09-19 DIAGNOSIS — J44.9 CHRONIC OBSTRUCTIVE PULMONARY DISEASE, UNSPECIFIED COPD TYPE (MULTI): ICD-10-CM

## 2023-09-19 DIAGNOSIS — M45.0 ANKYLOSING SPONDYLITIS OF MULTIPLE SITES IN SPINE (MULTI): Primary | ICD-10-CM

## 2023-09-19 DIAGNOSIS — Z79.899 MEDICATION MANAGEMENT: ICD-10-CM

## 2023-09-19 DIAGNOSIS — I50.20 SYSTOLIC HEART FAILURE, ACC/AHA STAGE C (MULTI): ICD-10-CM

## 2023-09-19 DIAGNOSIS — I10 HTN (HYPERTENSION), BENIGN: ICD-10-CM

## 2023-09-19 DIAGNOSIS — J96.11 CHRONIC RESPIRATORY FAILURE WITH HYPOXIA (MULTI): ICD-10-CM

## 2023-09-19 DIAGNOSIS — E55.9 VITAMIN D DEFICIENCY: ICD-10-CM

## 2023-09-19 DIAGNOSIS — E78.00 HYPERCHOLESTEROLEMIA: ICD-10-CM

## 2023-09-19 PROCEDURE — G0008 ADMIN INFLUENZA VIRUS VAC: HCPCS | Performed by: INTERNAL MEDICINE

## 2023-09-19 PROCEDURE — 3074F SYST BP LT 130 MM HG: CPT | Performed by: INTERNAL MEDICINE

## 2023-09-19 PROCEDURE — 3078F DIAST BP <80 MM HG: CPT | Performed by: INTERNAL MEDICINE

## 2023-09-19 PROCEDURE — 90662 IIV NO PRSV INCREASED AG IM: CPT | Performed by: INTERNAL MEDICINE

## 2023-09-19 PROCEDURE — 99214 OFFICE O/P EST MOD 30 MIN: CPT | Performed by: INTERNAL MEDICINE

## 2023-09-19 PROCEDURE — 4004F PT TOBACCO SCREEN RCVD TLK: CPT | Performed by: INTERNAL MEDICINE

## 2023-09-19 RX ORDER — PREDNISONE 10 MG/1
10 TABLET ORAL 3 TIMES DAILY
Qty: 15 TABLET | Refills: 0 | Status: SHIPPED | OUTPATIENT
Start: 2023-09-19 | End: 2023-09-24

## 2023-09-19 RX ORDER — AZITHROMYCIN 250 MG/1
TABLET, FILM COATED ORAL
Qty: 6 TABLET | Refills: 0 | Status: SHIPPED | OUTPATIENT
Start: 2023-09-19 | End: 2023-09-24

## 2023-09-19 ASSESSMENT — ENCOUNTER SYMPTOMS
LIGHT-HEADEDNESS: 0
PALPITATIONS: 0
JOINT SWELLING: 0
CONFUSION: 0
HEMATOLOGIC/LYMPHATIC NEGATIVE: 1
CONSTITUTIONAL NEGATIVE: 1
DIARRHEA: 0
DYSURIA: 0
NUMBNESS: 0
AGITATION: 0
ALLERGIC/IMMUNOLOGIC NEGATIVE: 1
ABDOMINAL PAIN: 0
MUSCULOSKELETAL NEGATIVE: 1
NAUSEA: 0
VOMITING: 0
FEVER: 0
ADENOPATHY: 0
NEUROLOGICAL NEGATIVE: 1
GASTROINTESTINAL NEGATIVE: 1
EYE DISCHARGE: 0
ABDOMINAL DISTENTION: 0
PSYCHIATRIC NEGATIVE: 1
SHORTNESS OF BREATH: 0
ENDOCRINE NEGATIVE: 1
CARDIOVASCULAR NEGATIVE: 1
HEADACHES: 0
NECK STIFFNESS: 0
CONSTIPATION: 0
RESPIRATORY NEGATIVE: 1
COUGH: 0
CHILLS: 0
WHEEZING: 0
EYES NEGATIVE: 1
BACK PAIN: 0

## 2023-09-19 NOTE — PROGRESS NOTES
Subjective   Patient ID: Horacio Gupta is a 62 y.o. male who presents for Follow-up (Fu xrays and labs).  Here for fu with labs feels fine    Wants z pac and prednisone in case        Review of Systems   Constitutional: Negative.  Negative for chills and fever.   HENT: Negative.  Negative for congestion.    Eyes: Negative.  Negative for discharge.   Respiratory: Negative.  Negative for cough, shortness of breath and wheezing.    Cardiovascular: Negative.  Negative for chest pain, palpitations and leg swelling.   Gastrointestinal: Negative.  Negative for abdominal distention, abdominal pain, constipation, diarrhea, nausea and vomiting.   Endocrine: Negative.    Genitourinary: Negative.  Negative for dysuria and urgency.   Musculoskeletal: Negative.  Negative for back pain, joint swelling and neck stiffness.   Skin: Negative.  Negative for rash.   Allergic/Immunologic: Negative.  Negative for immunocompromised state.   Neurological: Negative.  Negative for light-headedness, numbness and headaches.   Hematological: Negative.  Negative for adenopathy.   Psychiatric/Behavioral: Negative.  Negative for agitation, behavioral problems and confusion.    All other systems reviewed and are negative.      Objective   Physical Exam  Vitals reviewed.   Constitutional:       General: He is not in acute distress.     Appearance: Normal appearance.   HENT:      Head: Normocephalic and atraumatic.      Nose: Nose normal.   Eyes:      Conjunctiva/sclera: Conjunctivae normal.      Pupils: Pupils are equal, round, and reactive to light.   Neck:      Vascular: No carotid bruit.   Cardiovascular:      Rate and Rhythm: Normal rate and regular rhythm.      Pulses: Normal pulses.      Heart sounds:      No gallop.   Pulmonary:      Effort: Pulmonary effort is normal. No respiratory distress.      Breath sounds: Normal breath sounds. No wheezing.   Abdominal:      General: Bowel sounds are normal.      Palpations: Abdomen is soft.       "Tenderness: There is no abdominal tenderness.   Musculoskeletal:         General: Normal range of motion.      Cervical back: Normal range of motion. No rigidity.   Lymphadenopathy:      Cervical: No cervical adenopathy.   Skin:     General: Skin is warm.      Findings: No rash.   Neurological:      General: No focal deficit present.      Mental Status: He is alert and oriented to person, place, and time.   Psychiatric:         Mood and Affect: Mood normal.         Behavior: Behavior normal.       /67 (BP Location: Right arm, Patient Position: Sitting)   Pulse 76   Ht 1.626 m (5' 4\")   Wt 68 kg (150 lb)   BMI 25.75 kg/m²    PSA   Date/Time Value Ref Range Status   01/31/2022 06:55 AM 1.29 0.00 - 4.00 ng/mL Final     Comment:     The FDA requires that the method used for PSA assay be   reported to the physician. Values obtained with different   assay methods must not be used interchangeably. This test  was performed at [facility] using the Access   Hybritech PSA assay is a two-site immunoenzymatic sandwich   assay. The assay is approved for measurement of   prostate-specific antigen (PSA)in serum and may be used   in conjunction with a digital rectal examination in men   50 years and older as an aid in detection of prostate   cancer.  5-Alpha-reductase inhibitors (e.g. Proscar, Finasteride,   Avodart, Dutasteride and Zahira) for the treatment of BPH   have been shown to lower PSA levels by an average of 50%   after 6 months of treatment.       Hemoglobin A1C   Date/Time Value Ref Range Status   08/17/2020 08:32 AM 6.3 % Final     Comment:          Diagnosis of Diabetes-Adults   Non-Diabetic: < or = 5.6%   Increased risk for developing diabetes: 5.7-6.4%   Diagnostic of diabetes: > or = 6.5%  .       Monitoring of Diabetes                Age (y)     Therapeutic Goal (%)   Adults:          >18           <7.0   Pediatrics:    13-18           <7.5                   7-12           <8.0            "        0- 6            7.5-8.5   American Diabetes Association. Diabetes Care 33(S1), Jan 2010.       Assessment/Plan   Problem List Items Addressed This Visit       Ankylosing spondylitis (CMS/[facility]) - Primary    Relevant Orders    Referral to Physical Therapy    Comprehensive Metabolic Panel    Chronic obstructive pulmonary disease (CMS/[facility])    Relevant Orders    Comprehensive Metabolic Panel    HTN (hypertension), benign    Relevant Orders    Comprehensive Metabolic Panel    Hypercholesterolemia    Relevant Orders    Comprehensive Metabolic Panel    Systolic heart failure, ACC/AHA stage C (CMS/[facility])    Relevant Orders    Comprehensive Metabolic Panel    Vitamin D deficiency    Relevant Orders    Comprehensive Metabolic Panel    Vitamin D 25-Hydroxy,Total (for eval of Vitamin D levels)    Chronic respiratory failure with hypoxia (CMS/[facility])    Relevant Orders    Comprehensive Metabolic Panel     Other Visit Diagnoses       Medication management        Relevant Medications    azithromycin (Zithromax) 250 mg tablet    predniSONE (Deltasone) 10 mg tablet    Healthcare maintenance        Relevant Orders    Flu vaccine, quadrivalent, high-dose, preservative free, age 65y+ (FLUZONE) (Completed)           Clinically doing fine    MONITOR BP   GOAL BP LOWER THAN 130/80  LOW SALT  EXERCISE DAILY  High protein diet      MDM    1) COMPLEXITY: MORE THAN 1 STABLE CHRONIC CONDITION ADDRESSED  2)DATA: TESTS INTERPRETED AND OR ORDERED, TOOK INDEPENDENT HISTORY OR RECORDS REVIEWED  3)RISK: MODERATE RISK DUE TO NATURE OF MEDICAL CONDITIONS/COMORBIDITY OR MEDICATIONS ORDERED OR SURGICAL OR PROCEDURE REFERRAL, .

## 2023-09-29 ENCOUNTER — TRANSCRIBE ORDERS (OUTPATIENT)
Dept: CARDIOLOGY | Facility: HOSPITAL | Age: 63
End: 2023-09-29
Payer: MEDICARE

## 2023-09-29 DIAGNOSIS — I50.20 HEART FAILURE WITH REDUCED LEFT VENTRICULAR FUNCTION (MULTI): Primary | ICD-10-CM

## 2023-10-03 ENCOUNTER — APPOINTMENT (OUTPATIENT)
Dept: PHYSICAL THERAPY | Facility: CLINIC | Age: 63
End: 2023-10-03
Payer: MEDICARE

## 2023-10-03 NOTE — PROGRESS NOTES
Cardiology Subsequent Encounter Clinic Note  Name: Horacio Gupta  MRN: [id number]  : 1960    CC: Heart failure with reduced ejection fraction    Active Issues:  62-year-old gentleman with a past medical history of coronary artery disease status post PCI (RCA 2016), ACC/AHA stage C HFrEF (mixed cardiomyopathy, with last known ejection fraction 30-35% May 2023), status post ICD (2016), ascending aorta aneurysm with moderate central jet of aortic regurgitation, hypertension, obstructive sleep apnea, COPD, ankylosing spondylitis, ulcerative colitis. Patient has previously been seen by advanced heart failure at [facility] (Dr. Delatorre), and Dr. Mason. Here to establish care for heart failure:     Problem #1 ACC/AHA stage C HFrEF  -Current GDMT includes Toprol 50 mg daily, lisinopril 20 mg daily, spironolactone 25 mg daily  -Unable to tolerate Entresto secondary to hypotension. Unable to tolerate Jardiance due to headaches     Problem #2 coronary artery disease as detailed above  -Currently on Plavix/statin     Problem #3 aortic root dilatation with secondary aortic regurgitation (moderate), history of ascending aortic aneurysm  -Follows with Dr. Montaño    Patient reports both him and his wife had an unspecified viral illness about 5 weeks ago.  Tested negative for COVID.  It started with cough/rhinorrhea and chest tightness and shortness of breath.  The cough and rhinorrhea has improved.  However the chest tightness/shortness of breath has remained largely stable over the past 2-3 weeks.  He reports chest tightness that is tender to palpation and changes with respiration.  And shortness of breath that is accompanied by extreme exertion and worse with exertion.  It is reminiscent of when he had his stents.    Past Medical History  Past Medical History:   Diagnosis Date    Ankylosing spondylitis of unspecified sites in spine (CMS/[facility]) 2022    Ankylosing spondylitis    Aortic aneurysm of unspecified  site, without rupture (CMS/[facility]) 04/26/2022    Aortic aneurysm    Atherosclerotic heart disease of native coronary artery without angina pectoris 07/27/2022    CAD (coronary artery disease)    Chronic obstructive pulmonary disease, unspecified (CMS/[facility]) 10/11/2022    Chronic obstructive pulmonary disease    Essential (primary) hypertension 10/11/2022    HTN (hypertension), benign    Gastro-esophageal reflux disease without esophagitis 10/11/2022    GERD (gastroesophageal reflux disease)    Noninfective gastroenteritis and colitis, unspecified 06/08/2022    Inflammatory bowel diseases (IBD)    Obstructive sleep apnea (adult) (pediatric) 10/27/2020    Obstructive sleep apnea    Other idiopathic scoliosis, site unspecified 08/17/2020    Idiopathic scoliosis in adult patient    Pure hypercholesterolemia, unspecified 06/08/2022    Hypercholesterolemia    Ulcerative colitis, unspecified, without complications (CMS/[facility]) 10/11/2022    Ulcerative colitis    Unilateral inguinal hernia, without obstruction or gangrene, not specified as recurrent 12/10/2019    Right inguinal hernia    Unilateral primary osteoarthritis, right hip 03/05/2020    Arthritis of right hip    Unspecified systolic (congestive) heart failure (CMS/[facility]) 10/11/2022    Systolic heart failure, ACC/AHA stage C    Ventral hernia without obstruction or gangrene 11/25/2019    Ventral hernia    Vitamin D deficiency, unspecified 10/11/2022    Vitamin D deficiency       Past Surgical History  Past Surgical History:   Procedure Laterality Date    CORONARY ANGIOPLASTY WITH STENT PLACEMENT  01/20/2017    Cath Stent Placement    OTHER SURGICAL HISTORY  01/20/2017    Implantable Cardioverter-Defibrillator    OTHER SURGICAL HISTORY  10/14/2022    Colonoscopy    OTHER SURGICAL HISTORY  07/19/2021    Intra-articular corticosteroid injection    OTHER SURGICAL HISTORY  07/16/2020    Ankle surgery    OTHER SURGICAL HISTORY  12/10/2019    Finger surgical procedure    OTHER  SURGICAL HISTORY  12/10/2019    Esophagogastroduodenoscopy       Medications  Current Outpatient Medications on File Prior to Visit   Medication Sig Dispense Refill    acidophilus-pectin, citrus 25 million cell -100 mg tablet Take 2 tablets by mouth once daily.      albuterol 2.5 mg /3 mL (0.083 %) nebulizer solution Inhale 3 mL (2.5 mg) every 6 hours if needed for shortness of breath.      albuterol 2.5 mg /3 mL (0.083 %) nebulizer solution Per instructions DAILY (route: inhalation)      albuterol 90 mcg/actuation inhaler Inhale 2 puffs  in the morning and 2 puffs at noon and 2 puffs in the evening and 2 puffs before bedtime. As directed. 18 g 11    aspirin 81 mg capsule Take 1 tablet by mouth once daily.      atorvastatin (Lipitor) 80 mg tablet Take 1 tablet (80 mg) by mouth once daily at bedtime.      azithromycin (Zithromax) 250 mg tablet Take 1 tablet (250 mg) by mouth once daily. 6 tablet 0    budesonide-formoteroL (Symbicort) 80-4.5 mcg/actuation inhaler Inhale 2 puffs 2 times a day. Rinse mouth after use      bumetanide (Bumex) 1 mg tablet Take 1 tablet (1 mg) by mouth once daily as needed (For 3 lb weight gain or +2 edema).      calcium carbonate-vitamin D3 600 mg-5 mcg (200 unit) tablet Take 1 tablet by mouth once daily.      cetirizine (ZyrTEC) 10 mg tablet Take 1 tablet (10 mg) by mouth once daily. As directed      cetirizine (ZyrTEC) 10 mg tablet 1 tablet DAILY (route: oral)      cholecalciferol (VITAMIN D-3) 10 mcg (400 unit) tablet Take by mouth every other day.      cholecalciferol (Vitamin D-3) 125 MCG (5000 UT) capsule Take 1 capsule (125 mcg) by mouth every other day.      clopidogrel (Plavix) 75 mg tablet Take 1 tablet (75 mg) by mouth once daily.      cyclobenzaprine (Flexeril) 10 mg tablet Take 1 tablet (10 mg) by mouth 2 times a day as needed for muscle spasms.      diclofenac sodium (Voltaren) 1 % gel gel APPLY 1 APPLICATION TOPICALLY 4 TIMES DAILY 100 g 0    DULoxetine (Cymbalta) 30 mg DR  capsule Take 1 capsule (30 mg) by mouth once daily.      fexofenadine (Allegra) 180 mg tablet Take 1 tablet (180 mg) by mouth once daily.      gabapentin (Neurontin) 300 mg capsule Take 1 capsule (300 mg) by mouth 2 times a day.      gabapentin (Neurontin) 400 mg capsule Take 1 capsule (400 mg) by mouth 3 times a day.      guaiFENesin (Mucinex) 600 mg 12 hr tablet Take 1 tablet (600 mg) by mouth every 12 hours.      infliximab (Remicade) 100 mg recon soln Infuse 100 mg into a venous catheter.      lactobacillus acidophilus 500 million cell capsule Take 2 capsules by mouth once daily.      lidocaine (Lidoderm) 5 % patch Place 1 patch on the skin once daily. Apply patch to the affected area and leave in place for 12 hours, then remove and leave off for 12 hours. Remove & discard patch within 12 hours or as directed by MD.      lisinopril 20 mg tablet Take 1 tablet (20 mg) by mouth once daily.      LORazepam (Ativan) 1 mg tablet TAKE ONE TABLET BY MOUTH ONE HOUR BEFORE PROCEDURES      magnesium oxide (Mag-Ox) 400 mg (241.3 mg magnesium) tablet Take 1 tablet (400 mg) by mouth once daily.      methylPREDNISolone (Medrol Dospak) 4 mg tablets Follow schedule on package instructions 21 tablet 0    metoprolol succinate XL (Toprol-XL) 100 mg 24 hr tablet Take 1 tablet (100 mg) by mouth once daily.      multivitamin tablet Take 1 tablet by mouth once daily.      nitroglycerin (Nitrostat) 0.4 mg SL tablet Place 1 tablet (0.4 mg) under the tongue if needed for chest pain.      omeprazole (PriLOSEC) 20 mg DR capsule Take 1 capsule (20 mg) by mouth once daily.      polyethylene glycol (Glycolax) 17 gram/dose powder Take 17 g by mouth once daily. Mix 1 packet in 8 ounces of liquid and drink once daily      spironolactone (Aldactone) 25 mg tablet Take 1 tablet (25 mg) by mouth once daily.      tiotropium (Spiriva with HandiHaler) 18 mcg inhalation capsule Place 1 capsule (18 mcg) into inhaler and inhale once daily.      traMADol  (Ultram) 50 mg tablet TAKE 1 TABLET BY MOUTH THREE TIMES DAILY AS NEEDED (TAKE WITH TYLENOL 1000MG THREE TIMES DAILY)      triamcinolone (Kenalog) 0.025 % ointment Apply topically to affected (knees)  area once a day, As Needed      triamcinolone (Kenalog) 0.5 % cream 1 Application twice a day. Apply sparingly and massage in twice daily       No current facility-administered medications on file prior to visit.       Allergies  Allergies   Allergen Reactions    Empagliflozin Headache    Penicillins Hives and Angioedema     Angioedema    Ranolazine Unknown    Sacubitril-Valsartan Other and Headache     hypoglycemia    Sulfamethoxazole Headache       Social History  Social History     Tobacco Use    Smoking status: Every Day     Types: Cigarettes    Smokeless tobacco: Never   Vaping Use    Vaping Use: Never used   Substance Use Topics    Alcohol use: Not Currently    Drug use: Never       Family History  Family History   Problem Relation Name Age of Onset    Stroke Mother      Diabetes Mother      Heart failure Mother      Hypertension Mother      Heart failure Father      Hypertension Father      Diabetes Brother      Other (C A B G x1) Brother         Physical Examination  Vitals: There were no vitals taken for this visit.  General: awake, alert and oriented. No acute distress.   Skin: Skin is warm, dry and intact without rashes or lesions. Appropriate color for ethnicity. Nail beds pink with no cyanosis or clubbing  HEENT: normocephalic, atraumatic; conjunctivae are clear without exudates or hemorrhage. Sclera is non-icteric. Eyelids are normal in appearance without swelling or lesions. Hearing intact. Nares are patent bilaterally. Moist mucous membranes.   Cardiovascular: Regular. No murmurs, gallops, or rubs are auscultated. S1 and S2 are heard and are of normal intensity. No JVD, no carotid bruits  Respiratory: Thorax symmetric. CTAB, breath sounds vesicular. No crackles, wheezes or ronchi.   Gastrointestinal:  soft, non-distended, BS + x 4  Genitourinary: exam deferred  Musculoskeletal: moves all extremities  Extremities: pulses palpable bilaterally; no swelling or erythema; no edema  Neurological: alert & oriented x 3; no focal deficits  Psychiatric: appropriate mood and affect       Labs/Imaging/Procedures    Lab Results   Component Value Date    HGB 16.5 09/14/2023    HGB 15.3 04/20/2023    HGB 10.4 (L) 02/09/2023     09/14/2023    WBC 8.8 09/14/2023     09/14/2023    K 4.5 09/14/2023    CREATININE 0.86 09/14/2023    CREATININE 0.75 08/22/2023    CREATININE 0.69 04/20/2023    BUN 11 09/14/2023    CALCIUM 9.3 09/14/2023    INR 0.9 02/07/2023    BNP 35 08/05/2021    TROPHS 14 02/03/2023    TROPHS 7 02/03/2023    TROPHS 7 02/03/2023    LDLF 88 01/16/2023     No echocardiogram results found for the past 12 months  XR chest 2 views  Narrative: Interpreted By:  LEVI VALENZUELA MD  MRN: [id number]  Patient Name: ARSENIO LARES     STUDY:  TH CHEST 2 VIEW PA AND LAT;  9/14/2023 2:38 pm     INDICATION:  COPD.     COMPARISON:  02/04/2023     ACCESSION NUMBER(S):  [id number]     ORDERING CLINICIAN:  TORI JOHNSON     FINDINGS:  CHEST/LUNGS:  The cardiac and mediastinal silhouettes are unchanged in size and  configuration. Calcified granuloma in the right mid lung. Dual lead  cardiac device overlies the left hemithorax and is unchanged in  position. Mild atelectasis/scarring in the lung bases. No new  infiltrates.  No focal areas of consolidation are noted. No effusion or  pneumothorax is seen.     UPPER ABDOMEN:  No remarkable upper abdominal findings.     OSSEOUS STRUCTURES:  No acute changes.     Impression: No radiographic evidence of an acute cardiopulmonary process.    Cardiac MRI August 2020  1. Normal LV size (EDVi=92ml/m2) with moderately impaired systolic   function (LVEF=34%).   2. The mid anterior/anterolateral walls are akinetic with global   hypokinesis elsewhere.   3. Moderate central aortic  "regurgitation.   4. Moderately dilated ascending aorta (4.3 cm), normal aortic root.   5. Transmural LAD territory infarction of the mid   anterior/anterolateral wall with contiguous regions of  subendocardial infarction.     Echocardiogram dated May 2023 shows globally hypokinetic left ventricle with an EF of 30-35%    Impression  62-year-old gentleman with a past medical history of coronary artery disease status post PCI (RCA 4/2016), ACC/AHA stage C HFrEF (mixed cardiomyopathy, with last known ejection fraction 30-35% May 2023), status post ICD (8/2016), ascending aorta aneurysm with moderate central jet of aortic regurgitation, hypertension, obstructive sleep apnea, COPD, ankylosing spondylitis, ulcerative colitis. Patient has previously been seen by advanced heart failure at [facility] (Dr. Delatorre), and Dr. Mason. Here to establish care for heart failure:     Problem #1 ACC/AHA stage C HFrEF  -Current GDMT includes Toprol 50 mg daily, lisinopril 20 mg daily, spironolactone 25 mg daily  -Unable to tolerate Entresto secondary to hypotension. Unable to tolerate Jardiance due to headaches  -Continue current medications. I am hesitant to uptitrate the lisinopril since he does admit to occasional dizziness.     Problem #2 coronary artery disease as detailed above  -Currently on Plavix/statin    Problem #3 aortic root dilatation with secondary aortic regurgitation (moderate), history of ascending aortic aneurysm   -Follows with Dr. Montaño    Plan:  -The chest tightness/shortness of breath is likely multifactorial.  There are lingering effects of the unspecified viral infection; the patient has COPD with current tobacco use that is likely playing a role.  However a component of ischemic heart disease cannot be excluded with his history of remote stents.  We will perform a regadenoson stress test to exclude the same  -Appears \"warm and dry\" from the standpoint of heart failure.  Continue current medical therapy.  ICD in " place  -Follow-up with his aneurysm with Dr. Danyel RUSSO 6 weeks    Wilbur Polk MD  Advanced Heart Failure/Transplant Cardiology  Cardio-Oncology  [facility]

## 2023-10-04 ENCOUNTER — OFFICE VISIT (OUTPATIENT)
Dept: CARDIOLOGY | Facility: CLINIC | Age: 63
End: 2023-10-04
Payer: MEDICARE

## 2023-10-04 VITALS
BODY MASS INDEX: 25.46 KG/M2 | WEIGHT: 143.7 LBS | SYSTOLIC BLOOD PRESSURE: 92 MMHG | OXYGEN SATURATION: 93 % | DIASTOLIC BLOOD PRESSURE: 56 MMHG | HEART RATE: 64 BPM | HEIGHT: 63 IN

## 2023-10-04 DIAGNOSIS — I25.5 ISCHEMIC CARDIOMYOPATHY: ICD-10-CM

## 2023-10-04 DIAGNOSIS — I35.1 MODERATE AORTIC REGURGITATION: ICD-10-CM

## 2023-10-04 DIAGNOSIS — Z95.810 S/P ICD (INTERNAL CARDIAC DEFIBRILLATOR) PROCEDURE: ICD-10-CM

## 2023-10-04 DIAGNOSIS — I25.118 CORONARY ARTERY DISEASE INVOLVING NATIVE CORONARY ARTERY OF NATIVE HEART WITH OTHER FORM OF ANGINA PECTORIS (CMS-HCC): Primary | ICD-10-CM

## 2023-10-04 DIAGNOSIS — R06.02 SOB (SHORTNESS OF BREATH) ON EXERTION: ICD-10-CM

## 2023-10-04 PROCEDURE — 4004F PT TOBACCO SCREEN RCVD TLK: CPT | Performed by: STUDENT IN AN ORGANIZED HEALTH CARE EDUCATION/TRAINING PROGRAM

## 2023-10-04 PROCEDURE — 3074F SYST BP LT 130 MM HG: CPT | Performed by: STUDENT IN AN ORGANIZED HEALTH CARE EDUCATION/TRAINING PROGRAM

## 2023-10-04 PROCEDURE — 99214 OFFICE O/P EST MOD 30 MIN: CPT | Performed by: STUDENT IN AN ORGANIZED HEALTH CARE EDUCATION/TRAINING PROGRAM

## 2023-10-04 PROCEDURE — 3078F DIAST BP <80 MM HG: CPT | Performed by: STUDENT IN AN ORGANIZED HEALTH CARE EDUCATION/TRAINING PROGRAM

## 2023-10-04 RX ORDER — DOCUSATE SODIUM 100 MG/1
100 CAPSULE, LIQUID FILLED ORAL 2 TIMES DAILY
COMMUNITY

## 2023-10-04 RX ORDER — REGADENOSON 0.08 MG/ML
0.4 INJECTION, SOLUTION INTRAVENOUS
Status: CANCELLED | OUTPATIENT
Start: 2023-10-04

## 2023-10-05 ENCOUNTER — TELEPHONE (OUTPATIENT)
Dept: CARDIOLOGY | Facility: CLINIC | Age: 63
End: 2023-10-05
Payer: MEDICARE

## 2023-10-05 NOTE — TELEPHONE ENCOUNTER
PATIENT WANTED TO KNOW IF HIS STRESS TEST HAS BEEN SCHEDULED YET?  HE IS NOT GOOD THIS THURSDAY FOR THAT APPT.  PLEASE CALL HIM WITH DATE AND TIME.

## 2023-10-09 ENCOUNTER — HOSPITAL ENCOUNTER (OUTPATIENT)
Dept: RADIOLOGY | Facility: HOSPITAL | Age: 63
Discharge: HOME | End: 2023-10-09
Payer: MEDICARE

## 2023-10-09 ENCOUNTER — HOSPITAL ENCOUNTER (OUTPATIENT)
Dept: CARDIOLOGY | Facility: HOSPITAL | Age: 63
Discharge: HOME | End: 2023-10-09
Payer: MEDICARE

## 2023-10-09 DIAGNOSIS — I25.118 CORONARY ARTERY DISEASE INVOLVING NATIVE CORONARY ARTERY OF NATIVE HEART WITH OTHER FORM OF ANGINA PECTORIS (CMS-HCC): ICD-10-CM

## 2023-10-09 DIAGNOSIS — Z95.810 S/P ICD (INTERNAL CARDIAC DEFIBRILLATOR) PROCEDURE: ICD-10-CM

## 2023-10-09 DIAGNOSIS — R06.02 SOB (SHORTNESS OF BREATH) ON EXERTION: ICD-10-CM

## 2023-10-09 DIAGNOSIS — I25.5 ISCHEMIC CARDIOMYOPATHY: ICD-10-CM

## 2023-10-09 DIAGNOSIS — I35.1 MODERATE AORTIC REGURGITATION: ICD-10-CM

## 2023-10-09 DIAGNOSIS — I25.10 ATHEROSCLEROTIC HEART DISEASE OF NATIVE CORONARY ARTERY WITHOUT ANGINA PECTORIS: ICD-10-CM

## 2023-10-09 PROCEDURE — A9502 TC99M TETROFOSMIN: HCPCS | Performed by: STUDENT IN AN ORGANIZED HEALTH CARE EDUCATION/TRAINING PROGRAM

## 2023-10-09 PROCEDURE — 3430000001 HC RX 343 DIAGNOSTIC RADIOPHARMACEUTICALS: Performed by: STUDENT IN AN ORGANIZED HEALTH CARE EDUCATION/TRAINING PROGRAM

## 2023-10-09 PROCEDURE — 93018 CV STRESS TEST I&R ONLY: CPT | Performed by: NUCLEAR MEDICINE

## 2023-10-09 PROCEDURE — 93017 CV STRESS TEST TRACING ONLY: CPT

## 2023-10-09 PROCEDURE — 93016 CV STRESS TEST SUPVJ ONLY: CPT | Performed by: STUDENT IN AN ORGANIZED HEALTH CARE EDUCATION/TRAINING PROGRAM

## 2023-10-09 PROCEDURE — 78452 HT MUSCLE IMAGE SPECT MULT: CPT | Performed by: NUCLEAR MEDICINE

## 2023-10-09 PROCEDURE — 2500000004 HC RX 250 GENERAL PHARMACY W/ HCPCS (ALT 636 FOR OP/ED): Performed by: STUDENT IN AN ORGANIZED HEALTH CARE EDUCATION/TRAINING PROGRAM

## 2023-10-09 PROCEDURE — 93016 CV STRESS TEST SUPVJ ONLY: CPT | Performed by: NUCLEAR MEDICINE

## 2023-10-09 PROCEDURE — 78452 HT MUSCLE IMAGE SPECT MULT: CPT

## 2023-10-09 RX ORDER — REGADENOSON 0.08 MG/ML
0.4 INJECTION, SOLUTION INTRAVENOUS
Status: COMPLETED | OUTPATIENT
Start: 2023-10-09 | End: 2023-10-09

## 2023-10-09 RX ADMIN — TETROFOSMIN 26.4 MILLICURIE: 0.23 INJECTION, POWDER, LYOPHILIZED, FOR SOLUTION INTRAVENOUS at 09:06

## 2023-10-09 RX ADMIN — TETROFOSMIN 8.6 MILLICURIE: 0.23 INJECTION, POWDER, LYOPHILIZED, FOR SOLUTION INTRAVENOUS at 06:40

## 2023-10-09 RX ADMIN — REGADENOSON 0.4 MG: 0.08 INJECTION, SOLUTION INTRAVENOUS at 08:16

## 2023-10-11 ENCOUNTER — LAB (OUTPATIENT)
Dept: LAB | Facility: LAB | Age: 63
End: 2023-10-11
Payer: MEDICARE

## 2023-10-11 DIAGNOSIS — J44.9 CHRONIC OBSTRUCTIVE PULMONARY DISEASE, UNSPECIFIED COPD TYPE (MULTI): ICD-10-CM

## 2023-10-11 DIAGNOSIS — I10 HTN (HYPERTENSION), BENIGN: ICD-10-CM

## 2023-10-11 DIAGNOSIS — E55.9 VITAMIN D DEFICIENCY: ICD-10-CM

## 2023-10-11 DIAGNOSIS — E78.00 HYPERCHOLESTEROLEMIA: ICD-10-CM

## 2023-10-11 DIAGNOSIS — M45.0 ANKYLOSING SPONDYLITIS OF MULTIPLE SITES IN SPINE (MULTI): ICD-10-CM

## 2023-10-11 DIAGNOSIS — M70.62 TROCHANTERIC BURSITIS OF BOTH HIPS: ICD-10-CM

## 2023-10-11 DIAGNOSIS — M70.61 TROCHANTERIC BURSITIS OF BOTH HIPS: ICD-10-CM

## 2023-10-11 DIAGNOSIS — I50.20 SYSTOLIC HEART FAILURE, ACC/AHA STAGE C (MULTI): ICD-10-CM

## 2023-10-11 DIAGNOSIS — J96.11 CHRONIC RESPIRATORY FAILURE WITH HYPOXIA (MULTI): ICD-10-CM

## 2023-10-11 DIAGNOSIS — R73.9 HYPERGLYCEMIA: ICD-10-CM

## 2023-10-11 LAB
25(OH)D3 SERPL-MCNC: 63 NG/ML (ref 30–100)
ALBUMIN SERPL BCP-MCNC: 4.5 G/DL (ref 3.4–5)
ALP SERPL-CCNC: 55 U/L (ref 33–136)
ALT SERPL W P-5'-P-CCNC: 21 U/L (ref 10–52)
ANION GAP SERPL CALC-SCNC: 10 MMOL/L (ref 10–20)
AST SERPL W P-5'-P-CCNC: 21 U/L (ref 9–39)
BASOPHILS # BLD AUTO: 0.04 X10*3/UL (ref 0–0.1)
BASOPHILS NFR BLD AUTO: 0.5 %
BILIRUB SERPL-MCNC: 0.3 MG/DL (ref 0–1.2)
BUN SERPL-MCNC: 16 MG/DL (ref 6–23)
CALCIUM SERPL-MCNC: 9.6 MG/DL (ref 8.6–10.3)
CHLORIDE SERPL-SCNC: 101 MMOL/L (ref 98–107)
CO2 SERPL-SCNC: 32 MMOL/L (ref 21–32)
CREAT SERPL-MCNC: 0.88 MG/DL (ref 0.5–1.3)
EOSINOPHIL # BLD AUTO: 0.15 X10*3/UL (ref 0–0.7)
EOSINOPHIL NFR BLD AUTO: 2 %
ERYTHROCYTE [DISTWIDTH] IN BLOOD BY AUTOMATED COUNT: 14 % (ref 11.5–14.5)
ERYTHROCYTE [SEDIMENTATION RATE] IN BLOOD BY WESTERGREN METHOD: 8 MM/H (ref 0–20)
EST. AVERAGE GLUCOSE BLD GHB EST-MCNC: 131 MG/DL
GFR SERPL CREATININE-BSD FRML MDRD: >90 ML/MIN/1.73M*2
GLUCOSE SERPL-MCNC: 94 MG/DL (ref 74–99)
HBA1C MFR BLD: 6.2 %
HCT VFR BLD AUTO: 53 % (ref 41–52)
HGB BLD-MCNC: 17.3 G/DL (ref 13.5–17.5)
IMM GRANULOCYTES # BLD AUTO: 0.05 X10*3/UL (ref 0–0.7)
IMM GRANULOCYTES NFR BLD AUTO: 0.7 % (ref 0–0.9)
LYMPHOCYTES # BLD AUTO: 1.98 X10*3/UL (ref 1.2–4.8)
LYMPHOCYTES NFR BLD AUTO: 26.2 %
MCH RBC QN AUTO: 32.6 PG (ref 26–34)
MCHC RBC AUTO-ENTMCNC: 32.6 G/DL (ref 32–36)
MCV RBC AUTO: 100 FL (ref 80–100)
MONOCYTES # BLD AUTO: 0.74 X10*3/UL (ref 0.1–1)
MONOCYTES NFR BLD AUTO: 9.8 %
NEUTROPHILS # BLD AUTO: 4.61 X10*3/UL (ref 1.2–7.7)
NEUTROPHILS NFR BLD AUTO: 60.8 %
NRBC BLD-RTO: 0 /100 WBCS (ref 0–0)
PLATELET # BLD AUTO: 245 X10*3/UL (ref 150–450)
PMV BLD AUTO: 10.1 FL (ref 7.5–11.5)
POTASSIUM SERPL-SCNC: 4.1 MMOL/L (ref 3.5–5.3)
PROT SERPL-MCNC: 7.1 G/DL (ref 6.4–8.2)
RBC # BLD AUTO: 5.31 X10*6/UL (ref 4.5–5.9)
SODIUM SERPL-SCNC: 139 MMOL/L (ref 136–145)
WBC # BLD AUTO: 7.6 X10*3/UL (ref 4.4–11.3)

## 2023-10-11 PROCEDURE — 82306 VITAMIN D 25 HYDROXY: CPT

## 2023-10-11 PROCEDURE — 85025 COMPLETE CBC W/AUTO DIFF WBC: CPT

## 2023-10-11 PROCEDURE — 80053 COMPREHEN METABOLIC PANEL: CPT

## 2023-10-11 PROCEDURE — 83036 HEMOGLOBIN GLYCOSYLATED A1C: CPT

## 2023-10-11 PROCEDURE — 85652 RBC SED RATE AUTOMATED: CPT

## 2023-10-11 PROCEDURE — 36415 COLL VENOUS BLD VENIPUNCTURE: CPT

## 2023-10-12 ENCOUNTER — TELEPHONE (OUTPATIENT)
Dept: CARDIOLOGY | Facility: CLINIC | Age: 63
End: 2023-10-12
Payer: MEDICARE

## 2023-10-13 ENCOUNTER — DOCUMENTATION (OUTPATIENT)
Dept: CARDIOLOGY | Facility: CLINIC | Age: 63
End: 2023-10-13
Payer: MEDICARE

## 2023-10-16 ENCOUNTER — OFFICE VISIT (OUTPATIENT)
Dept: PRIMARY CARE | Facility: CLINIC | Age: 63
End: 2023-10-16
Payer: MEDICARE

## 2023-10-16 ENCOUNTER — TELEPHONE (OUTPATIENT)
Dept: PRIMARY CARE | Facility: CLINIC | Age: 63
End: 2023-10-16

## 2023-10-16 VITALS
BODY MASS INDEX: 26.05 KG/M2 | WEIGHT: 147 LBS | SYSTOLIC BLOOD PRESSURE: 103 MMHG | HEART RATE: 67 BPM | DIASTOLIC BLOOD PRESSURE: 67 MMHG | HEIGHT: 63 IN

## 2023-10-16 DIAGNOSIS — M45.0 ANKYLOSING SPONDYLITIS OF MULTIPLE SITES IN SPINE (MULTI): ICD-10-CM

## 2023-10-16 DIAGNOSIS — Z00.00 ROUTINE GENERAL MEDICAL EXAMINATION AT HEALTH CARE FACILITY: Primary | ICD-10-CM

## 2023-10-16 DIAGNOSIS — K21.9 GASTROESOPHAGEAL REFLUX DISEASE, UNSPECIFIED WHETHER ESOPHAGITIS PRESENT: ICD-10-CM

## 2023-10-16 DIAGNOSIS — R73.01 IFG (IMPAIRED FASTING GLUCOSE): ICD-10-CM

## 2023-10-16 DIAGNOSIS — E78.00 HYPERCHOLESTEROLEMIA: ICD-10-CM

## 2023-10-16 DIAGNOSIS — Z00.00 HEALTHCARE MAINTENANCE: ICD-10-CM

## 2023-10-16 DIAGNOSIS — H61.22 IMPACTED CERUMEN, LEFT EAR: ICD-10-CM

## 2023-10-16 DIAGNOSIS — E55.9 VITAMIN D DEFICIENCY: ICD-10-CM

## 2023-10-16 DIAGNOSIS — Z12.5 SPECIAL SCREENING FOR MALIGNANT NEOPLASM OF PROSTATE: ICD-10-CM

## 2023-10-16 DIAGNOSIS — M45.0 ANKYLOSING SPONDYLITIS OF MULTIPLE SITES IN SPINE (MULTI): Primary | ICD-10-CM

## 2023-10-16 DIAGNOSIS — J96.11 CHRONIC RESPIRATORY FAILURE WITH HYPOXIA (MULTI): ICD-10-CM

## 2023-10-16 DIAGNOSIS — J44.9 CHRONIC OBSTRUCTIVE PULMONARY DISEASE, UNSPECIFIED COPD TYPE (MULTI): ICD-10-CM

## 2023-10-16 DIAGNOSIS — I10 HTN (HYPERTENSION), BENIGN: ICD-10-CM

## 2023-10-16 DIAGNOSIS — I50.20 SYSTOLIC HEART FAILURE, ACC/AHA STAGE C (MULTI): ICD-10-CM

## 2023-10-16 PROCEDURE — 69209 REMOVE IMPACTED EAR WAX UNI: CPT | Performed by: INTERNAL MEDICINE

## 2023-10-16 PROCEDURE — 4004F PT TOBACCO SCREEN RCVD TLK: CPT | Performed by: INTERNAL MEDICINE

## 2023-10-16 PROCEDURE — G0439 PPPS, SUBSEQ VISIT: HCPCS | Performed by: INTERNAL MEDICINE

## 2023-10-16 PROCEDURE — 3078F DIAST BP <80 MM HG: CPT | Performed by: INTERNAL MEDICINE

## 2023-10-16 PROCEDURE — 99214 OFFICE O/P EST MOD 30 MIN: CPT | Performed by: INTERNAL MEDICINE

## 2023-10-16 PROCEDURE — 3074F SYST BP LT 130 MM HG: CPT | Performed by: INTERNAL MEDICINE

## 2023-10-16 RX ORDER — L. ACIDOPHILUS/PECTIN, CITRUS 25MM-100MG
2 TABLET ORAL DAILY
Qty: 180 TABLET | Refills: 3 | Status: SHIPPED | OUTPATIENT
Start: 2023-10-16

## 2023-10-16 RX ORDER — OMEPRAZOLE 20 MG/1
20 CAPSULE, DELAYED RELEASE ORAL DAILY
Qty: 90 CAPSULE | Refills: 3 | Status: SHIPPED | OUTPATIENT
Start: 2023-10-16 | End: 2024-02-27 | Stop reason: SDUPTHER

## 2023-10-16 ASSESSMENT — ACTIVITIES OF DAILY LIVING (ADL)
BATHING: INDEPENDENT
DOING_HOUSEWORK: INDEPENDENT
MANAGING_FINANCES: INDEPENDENT
DRESSING: INDEPENDENT
GROCERY_SHOPPING: INDEPENDENT
TAKING_MEDICATION: INDEPENDENT

## 2023-10-16 ASSESSMENT — ENCOUNTER SYMPTOMS
ABDOMINAL PAIN: 0
FEVER: 0
ALLERGIC/IMMUNOLOGIC NEGATIVE: 1
MUSCULOSKELETAL NEGATIVE: 1
NECK STIFFNESS: 0
WHEEZING: 0
JOINT SWELLING: 0
ABDOMINAL DISTENTION: 0
ADENOPATHY: 0
EYE DISCHARGE: 0
AGITATION: 0
HEADACHES: 0
VOMITING: 0
CHILLS: 0
RESPIRATORY NEGATIVE: 1
CONFUSION: 0
NUMBNESS: 0
GASTROINTESTINAL NEGATIVE: 1
NEUROLOGICAL NEGATIVE: 1
SHORTNESS OF BREATH: 0
BACK PAIN: 0
LIGHT-HEADEDNESS: 0
PALPITATIONS: 0
CONSTIPATION: 0
HEMATOLOGIC/LYMPHATIC NEGATIVE: 1
ENDOCRINE NEGATIVE: 1
EYES NEGATIVE: 1
NAUSEA: 0
COUGH: 0
PSYCHIATRIC NEGATIVE: 1
DYSURIA: 0
CARDIOVASCULAR NEGATIVE: 1
CONSTITUTIONAL NEGATIVE: 1
DIARRHEA: 0

## 2023-10-16 ASSESSMENT — PATIENT HEALTH QUESTIONNAIRE - PHQ9
SUM OF ALL RESPONSES TO PHQ9 QUESTIONS 1 AND 2: 0
SUM OF ALL RESPONSES TO PHQ9 QUESTIONS 1 AND 2: 0
1. LITTLE INTEREST OR PLEASURE IN DOING THINGS: NOT AT ALL
2. FEELING DOWN, DEPRESSED OR HOPELESS: NOT AT ALL
1. LITTLE INTEREST OR PLEASURE IN DOING THINGS: NOT AT ALL
2. FEELING DOWN, DEPRESSED OR HOPELESS: NOT AT ALL

## 2023-10-16 NOTE — TELEPHONE ENCOUNTER
PLEASE REPUT IN ORDER FOR PHYSICAL THEARPY FOR REG VISIT NOT ACQUATICS THEY WILL THEN CHANGE TO ACQUATIC VISITS AFTER THE ITINIAL VISIT.  THANKS.

## 2023-10-16 NOTE — PROGRESS NOTES
"Subjective   Patient ID: Horacio Gupta is a 62 y.o. male who presents for Medicare Annual Wellness Visit Subsequent (MEDICARE WELLNESS WITH LABS. DISCUSS WATER TX AND WANTS EARS CHECKED).  HPI    Review of Systems    Objective   Physical Exam  /67   Pulse 67   Ht 1.6 m (5' 3\")   Wt 66.7 kg (147 lb)   BMI 26.04 kg/m²    PSA   Date/Time Value Ref Range Status   01/31/2022 06:55 AM 1.29 0.00 - 4.00 ng/mL Final     Comment:     The FDA requires that the method used for PSA assay be   reported to the physician. Values obtained with different   assay methods must not be used interchangeably. This test  was performed at [facility] using the Access   Hybritech PSA assay is a two-site immunoenzymatic sandwich   assay. The assay is approved for measurement of   prostate-specific antigen (PSA)in serum and may be used   in conjunction with a digital rectal examination in men   50 years and older as an aid in detection of prostate   cancer.  5-Alpha-reductase inhibitors (e.g. Proscar, Finasteride,   Avodart, Dutasteride and Zahira) for the treatment of BPH   have been shown to lower PSA levels by an average of 50%   after 6 months of treatment.       Hemoglobin A1C   Date/Time Value Ref Range Status   10/11/2023 06:39 AM 6.2 (H) see below % Final     Assessment/Plan   Problem List Items Addressed This Visit    None       Labs reviewed with pt    MONITOR BP   GOAL BP LOWER THAN 130/80  LOW SALT  EXERCISE DAILY    1800 KALEB ADA  HGA1C GOAL LESS THAN 7  LOSE WT  EXERCISE DAILY      "

## 2023-10-16 NOTE — PROGRESS NOTES
"Subjective   Reason for Visit: Horacio Gupta is an 62 y.o. male here for a Medicare Wellness visit.     Past Medical, Surgical, and Family History reviewed and updated in chart.    Reviewed all medications by prescribing practitioner or clinical pharmacist (such as prescriptions, OTCs, herbal therapies and supplements) and documented in the medical record.    HERE FOR FU WITH LABS FEELS FINE    WELLNESS EXAM        Patient Care Team:  Clif Beckett MD as PCP - General  Clif Beckett MD as PCP [facility]     Review of Systems   Constitutional: Negative.  Negative for chills and fever.   HENT: Negative.  Negative for congestion.    Eyes: Negative.  Negative for discharge.   Respiratory: Negative.  Negative for cough, shortness of breath and wheezing.    Cardiovascular: Negative.  Negative for chest pain, palpitations and leg swelling.   Gastrointestinal: Negative.  Negative for abdominal distention, abdominal pain, constipation, diarrhea, nausea and vomiting.   Endocrine: Negative.    Genitourinary: Negative.  Negative for dysuria and urgency.   Musculoskeletal: Negative.  Negative for back pain, joint swelling and neck stiffness.   Skin: Negative.  Negative for rash.   Allergic/Immunologic: Negative.  Negative for immunocompromised state.   Neurological: Negative.  Negative for light-headedness, numbness and headaches.   Hematological: Negative.  Negative for adenopathy.   Psychiatric/Behavioral: Negative.  Negative for agitation, behavioral problems and confusion.    All other systems reviewed and are negative.      Objective   Vitals:  /67   Pulse 67   Ht 1.6 m (5' 3\")   Wt 66.7 kg (147 lb)   BMI 26.04 kg/m²       Physical Exam  Vitals reviewed.   Constitutional:       General: He is not in acute distress.     Appearance: Normal appearance.   HENT:      Head: Normocephalic and atraumatic.      Left Ear: There is impacted cerumen.      Nose: Nose normal.   Eyes:     "  Conjunctiva/sclera: Conjunctivae normal.      Pupils: Pupils are equal, round, and reactive to light.   Neck:      Vascular: No carotid bruit.   Cardiovascular:      Rate and Rhythm: Normal rate and regular rhythm.      Pulses: Normal pulses.      Heart sounds:      No gallop.   Pulmonary:      Effort: Pulmonary effort is normal. No respiratory distress.      Breath sounds: Normal breath sounds. No wheezing.   Abdominal:      General: Bowel sounds are normal.      Palpations: Abdomen is soft.      Tenderness: There is no abdominal tenderness.   Musculoskeletal:         General: Normal range of motion.      Cervical back: Normal range of motion. No rigidity.   Lymphadenopathy:      Cervical: No cervical adenopathy.   Skin:     General: Skin is warm.      Findings: No rash.   Neurological:      General: No focal deficit present.      Mental Status: He is alert and oriented to person, place, and time.   Psychiatric:         Mood and Affect: Mood normal.         Behavior: Behavior normal.         Assessment/Plan   Problem List Items Addressed This Visit       Ankylosing spondylitis (CMS/HCC)    Relevant Orders    Referral to Physical Therapy    Comprehensive Metabolic Panel    CBC and Auto Differential    Chronic obstructive pulmonary disease (CMS/HCC)    Relevant Orders    Comprehensive Metabolic Panel    CBC and Auto Differential    GERD (gastroesophageal reflux disease)    Relevant Medications    omeprazole (PriLOSEC) 20 mg DR capsule    Other Relevant Orders    Comprehensive Metabolic Panel    CBC and Auto Differential    HTN (hypertension), benign    Relevant Orders    Comprehensive Metabolic Panel    CBC and Auto Differential    Hypercholesterolemia    Relevant Orders    Lipid Panel    CBC and Auto Differential    Systolic heart failure, ACC/AHA stage C (CMS/HCC)    Overview     Echo 8/17/16 - LVIDd 5.5, LA 4.0, EF 30%, mild AR/TR, impaired relaxation         Relevant Orders    Comprehensive Metabolic Panel     CBC and Auto Differential    Vitamin D deficiency    Relevant Orders    Comprehensive Metabolic Panel    CBC and Auto Differential    Vitamin D 25-Hydroxy,Total (for eval of Vitamin D levels)    Chronic respiratory failure with hypoxia (CMS/HCC)    Relevant Orders    Comprehensive Metabolic Panel    CBC and Auto Differential    IFG (impaired fasting glucose)    Relevant Orders    Hemoglobin A1C     Other Visit Diagnoses       Routine general medical examination at health care facility    -  Primary    Impacted cerumen, left ear        Relevant Orders    Comprehensive Metabolic Panel    CBC and Auto Differential    Healthcare maintenance        Relevant Medications    lactobacillus acidophilus (acidophilus-pectin, citrus) tablet tablet    Other Relevant Orders    Comprehensive Metabolic Panel    CBC and Auto Differential    Special screening for malignant neoplasm of prostate        Relevant Orders    Prostate Specific Antigen, Screen    CBC and Auto Differential          MONITOR BP   GOAL BP LOWER THAN 130/80  LOW SALT  EXERCISE DAILY    1800 KALEB ADA  HGA1C GOAL LESS THAN 7  LOSE WT  EXERCISE DAILY    Labs reviewed with pt    I have counselled patient about need for smoking/tobacco cessation and how I can support efforts when patient is ready to quit.  Discussed nicotine replacement therapy, Varenicline, Bupropion, hypnosis, support groups, and acupuncture as potential options.  I spend 4 minutes counseling.        MDM    1) COMPLEXITY: MORE THAN 1 STABLE CHRONIC CONDITION ADDRESSED  2)DATA: TESTS INTERPRETED AND OR ORDERED, TOOK INDEPENDENT HISTORY OR RECORDS REVIEWED  3)RISK: MODERATE RISK DUE TO NATURE OF MEDICAL CONDITIONS/COMORBIDITY OR MEDICATIONS ORDERED OR SURGICAL OR PROCEDURE REFERRAL, .

## 2023-10-25 ENCOUNTER — EVALUATION (OUTPATIENT)
Dept: PHYSICAL THERAPY | Facility: CLINIC | Age: 63
End: 2023-10-25
Payer: MEDICARE

## 2023-10-25 DIAGNOSIS — M45.0 ANKYLOSING SPONDYLITIS OF MULTIPLE SITES IN SPINE (MULTI): ICD-10-CM

## 2023-10-25 PROCEDURE — 97161 PT EVAL LOW COMPLEX 20 MIN: CPT | Mod: GP | Performed by: PHYSICAL THERAPIST

## 2023-10-25 PROCEDURE — 97110 THERAPEUTIC EXERCISES: CPT | Mod: GP | Performed by: PHYSICAL THERAPIST

## 2023-10-25 ASSESSMENT — PAIN - FUNCTIONAL ASSESSMENT: PAIN_FUNCTIONAL_ASSESSMENT: 0-10

## 2023-10-25 ASSESSMENT — PAIN SCALES - GENERAL: PAINLEVEL_OUTOF10: 8

## 2023-10-25 ASSESSMENT — PATIENT HEALTH QUESTIONNAIRE - PHQ9
1. LITTLE INTEREST OR PLEASURE IN DOING THINGS: SEVERAL DAYS
2. FEELING DOWN, DEPRESSED OR HOPELESS: SEVERAL DAYS
SUM OF ALL RESPONSES TO PHQ9 QUESTIONS 1 AND 2: 2

## 2023-10-25 ASSESSMENT — ENCOUNTER SYMPTOMS
DEPRESSION: 0
LOSS OF SENSATION IN FEET: 0
OCCASIONAL FEELINGS OF UNSTEADINESS: 1

## 2023-10-25 NOTE — LETTER
October 25, 2023     Patient: Horacio Gupta   YOB: 1960   Date of Visit: 10/25/2023       To Whom It May Concern:    It is my medical opinion that Horacio Gupta {Work release (duty restriction):35352}.    If you have any questions or concerns, please don't hesitate to call.         Sincerely,        Jakob Lucero, PT    CC: No Recipients

## 2023-10-25 NOTE — PROGRESS NOTES
Physical Therapy Evaluation and Treatment      Patient Name: Horacio Gupta  MRN: [id number]  Today's Date: 10/25/2023  Time Calculation  Start Time: 1530  Stop Time: 1615  Time Calculation (min): 45 min      Assessment:  PT Assessment Results: Decreased strength, Decreased range of motion, Decreased mobility, Pain  Rehab Prognosis: Good  Barriers to Participation:  (none)    The pt presents with Medical Dx of ankylosing spondylitis of the spine.   Pt presents with the following deficits: increased pain, decreased strength, ROM, flexibility, gait and functional ability to lift/carry objects.  Pt would benefit from PT services in order to improve on these deficits and maximize strength and ability for functional activity/mobility.      Pt scored a 4 on the PHQ-9 questionnaire and was given local counseling resource form in the clinic.       Plan:  Treatment/Interventions: Aquatic therapy, Cryotherapy, Hot pack, Education/ Instruction, Manual therapy, Therapeutic exercises  PT Plan: Skilled PT  PT Frequency: 2 times per week  Duration: 2x/wk for 4 weeks or 9 total sessions counting PT eval  Rehab Potential: Good  Plan of Care Agreement: Patient    Current Problem:   Problem List Items Addressed This Visit             ICD-10-CM       Musculoskeletal and Injuries    Ankylosing spondylitis of multiple sites in spine (CMS/HCC) M45.0       Subjective   The pt reports that his lower back started after a fall in 2/2023 sustaining a back fx.  Pt reports that he has had pain off and on since that injury.  Pt reports pain primarily stays in the lower back and b/l hip area.  Pt reports that he still has significant LBP with standing longer time frames, lifting objects and walking longer distances.  Pt reports that rest and heat feel good on the back. The pt reports that his MD wants him to try PT rx and is recommending aquatic rx.      Precautions:  Precautions  Precautions Comment: Mild Fall Risk.   PMH:  HTN, hx of MI x 2,  pacemaker/defibrilator, COPD, emphysema, OA, vascular disease, L THR, L ankle reconstruction. (No e-stim due to pacemaker)    Pain:  Pain Assessment  Pain Assessment: 0-10  Pain Score: 8  Pain Type: Chronic pain  Pain Location: Back  Pain Orientation: Lower  Home Living:   Lives with wife in a 2 story home.  1 flt of steps with rail as needed at home.     Prior Level of Function:   Pt was I with all ADL's and IADL's prior.  Disability.    Objective  Objective   Observation:  FHP with rounded shoulders and increased upper thoracic kyphosis.    Palpation:  Tender L4-5 and lumbar paraspinals.    Sensation:  Intact to light touch b/l LE's.                                      Myotomes/Strength:      R-   Hip Flex   4/5   Knee flex   4/5   Knee ext   4/5   DF   4+/5   PF   4+/5        L-   Hip Flex   4/5   Knee flex   4/5   Knee ext   4/5   DF   4+/5   PF   4+/5                                                           Lumbar AROM:      R-   Flex   Miller  20%   Ext   Miller 70%    SB   Miller  50%        L-     SB   Miller   50%                                                          Special Tests:  R Slump Test  (-)                           L Slump Test  (-)                      R Piriformis Test  (+)                      L Piriformis Test   (+)    Flexion/Extension Biased:  Flexion Biased.    Outcome Measures:  Other Measures  Oswestry Disablity Index (ASH): 40%     Treatments:  There ex    Hooklying SKTC  5 sec hold x 5 reps ea  Hooklying Piriformis/Glut Stretch  20 sec hold x 2 ea  Hooklying Lateral Trunk Rotation  (small comfortable arc of motion)   2 min  HEP instruction with handout given      OP EDUCATION:   PT edu pt regarding PT POC/course of treatment and HEP.  Pt was given exercise handout as a reference.  Pt verbalized good understanding.      Goals:  Active       PT Problem       PT Goal 1       Start:  10/25/23    Expected End:  11/25/23       LTG's:    1) Improve Lumbar AROM from  Miller 70% ext and 50% b/l SB  to  Miller <= 50% ext and 30% b/l SB in order to facilitate improved gait and mobility.       4-6 weeks      2) Improve Core and B/L hip/ LE strength from 4/5  to >= 4+/5 throughout in order to facilitate safe gait and mobility.         4-6 weeks      3) Improve Modified Oswestry score by >= 5 points in order to improve QOL.     4-6 weeks      4) Improve LBP from  8/10 to <= 3 /10 with activity in order to improve QOL.      4-6 weeks      5) Pt will be able to walk longer distances, lift/carry objects and perform standing activity in the home without significant limitation       4-6 weeks      ST)  Pt/caregiver will be I and consistent with HEP in order to maximize strength and flexibility.    2-3 weeks

## 2023-10-25 NOTE — LETTER
October 25, 2023     Patient: Horacio Gupta   YOB: 1960   Date of Visit: 10/25/2023       To Whom it May Concern:    Horacio Gupta was seen in my clinic on 10/25/2023. He {Return to school/sport:12802}.    If you have any questions or concerns, please don't hesitate to call.         Sincerely,          Jakob Lucero, PT        CC: No Recipients

## 2023-11-07 ENCOUNTER — TREATMENT (OUTPATIENT)
Dept: PHYSICAL THERAPY | Facility: CLINIC | Age: 63
End: 2023-11-07
Payer: MEDICARE

## 2023-11-07 DIAGNOSIS — M45.0 ANKYLOSING SPONDYLITIS OF MULTIPLE SITES IN SPINE (MULTI): ICD-10-CM

## 2023-11-07 PROCEDURE — 97113 AQUATIC THERAPY/EXERCISES: CPT | Mod: GP,CQ

## 2023-11-07 ASSESSMENT — PAIN SCALES - GENERAL: PAINLEVEL_OUTOF10: 7

## 2023-11-07 ASSESSMENT — PAIN - FUNCTIONAL ASSESSMENT: PAIN_FUNCTIONAL_ASSESSMENT: 0-10

## 2023-11-07 NOTE — PROGRESS NOTES
"Physical Therapy Treatment    Patient Name: Horacio Gupta  MRN: [id number]  Today's Date: 11/7/2023  Time Calculation  Start Time: 1045  Stop Time: 1128  Time Calculation (min): 43 min      Assessment: Patient identified by name and date of birth.  Reviewed TrA contraction with patient, fair contraction noted with decreased trunk stability noted with UE/LE exercises. Patient able to perform squat with moderate cuing for good form. Slight increase in pain with hip abduction during unloading. Decreased pain with unloading.       Plan: Plan to continue with core strengthening to allow for improved tolerance to prolonged ambulation. JW  Treatment/Interventions: Aquatic therapy, Cryotherapy, Hot pack, Education/ Instruction, Manual therapy, Therapeutic exercises  PT Plan: Skilled PT  PT Frequency: 2 times per week  Duration: 2x/wk for 4 weeks or 9 total sessions counting PT eval  Rehab Potential: Good  Plan of Care Agreement: Patient       Current Problem  1. Ankylosing spondylitis of multiple sites in spine (CMS/HCC)  Follow Up In Physical Therapy          Subjective   Patient states that his low back pain and B/L hip pain is 7/10. No LE Sx. Pain at end of session 6/10.      Precautions  Precautions  Precautions Comment: Mild Fall Risk.   PMH:  HTN, hx of MI x 2, pacemaker/defibrilator, COPD, emphysema, OA, vascular disease, L THR, L ankle reconstruction. (No e-stim due to pacemaker)  Pain  Pain Assessment: 0-10  Pain Score: 7  Pain Type: Chronic pain  Pain Location: Back  Pain Orientation: Lower    Treatments:  Aquatics: 41'  TrA contraction 10 x 5\" holds  Side Stepping 4 laps  Hip Abd x10  Hip Flex x10  Heel Raises x10  Alt marching x10  Squats x10  UE Paddles Open 2x10  -push/pull, flex/ext, habd/hadd, abd/add  100% unloading 1 large noodle  -bicycle 5'  -hip abd 5'  -hang 5'  Gradual exit 5'     OP EDUCATION:       Goals:  Active       PT Problem       PT Goal 1       Start:  10/25/23    Expected End:  11/25/23 "       LTG's:    1) Improve Lumbar AROM from  Miller 70% ext and 50% b/l SB  to Miller <= 50% ext and 30% b/l SB in order to facilitate improved gait and mobility.       4-6 weeks      2) Improve Core and B/L hip/ LE strength from 4/5  to >= 4+/5 throughout in order to facilitate safe gait and mobility.         4-6 weeks      3) Improve Modified Oswestry score by >= 5 points in order to improve QOL.     4-6 weeks      4) Improve LBP from  8/10 to <= 3 /10 with activity in order to improve QOL.      4-6 weeks      5) Pt will be able to walk longer distances, lift/carry objects and perform standing activity in the home without significant limitation       4-6 weeks      ST)  Pt/caregiver will be I and consistent with HEP in order to maximize strength and flexibility.    2-3 weeks

## 2023-11-14 ENCOUNTER — TREATMENT (OUTPATIENT)
Dept: PHYSICAL THERAPY | Facility: CLINIC | Age: 63
End: 2023-11-14
Payer: MEDICARE

## 2023-11-14 DIAGNOSIS — M45.0 ANKYLOSING SPONDYLITIS OF MULTIPLE SITES IN SPINE (MULTI): ICD-10-CM

## 2023-11-14 PROCEDURE — 97113 AQUATIC THERAPY/EXERCISES: CPT | Mod: GP,CQ

## 2023-11-14 ASSESSMENT — PAIN SCALES - GENERAL: PAINLEVEL_OUTOF10: 6

## 2023-11-14 ASSESSMENT — PAIN - FUNCTIONAL ASSESSMENT: PAIN_FUNCTIONAL_ASSESSMENT: 0-10

## 2023-11-14 NOTE — PROGRESS NOTES
"Physical Therapy Treatment    Patient Name: Horacio Gupta  MRN: [id number]  Today's Date: 11/14/2023  Time Calculation  Start Time: 1215  Stop Time: 1300  Time Calculation (min): 45 min      Assessment: Patient identified by name and date of birth. Patient able to increase reps today, only slight increase to keep pain levels from increasing. Added step taps today, fair trunk control noted. Cues for good form with squats. Patient able to perform all aquatic exercises without increase in pain. Slight decrease in sharpness of pain with unloading.        Plan: Continue with core and LE strengthening to allow for improved trunk control with prolonged ambulation. JW  Treatment/Interventions: Aquatic therapy, Cryotherapy, Hot pack, Education/ Instruction, Manual therapy, Therapeutic exercises  PT Plan: Skilled PT  PT Frequency: 2 times per week  Duration: 2x/wk for 4 weeks or 9 total sessions counting PT eval  Rehab Potential: Good  Plan of Care Agreement: Patient       Current Problem  1. Ankylosing spondylitis of multiple sites in spine (CMS/[facility])  Follow Up In Physical Therapy          Subjective   Patient reports that he [place] [employer] [employer] before coming, in 1 lap around. Report pain levels of 6/10 in low back. States that he was sore after last session. 5.5/10 pain levels after session.      Precautions  Precautions  Precautions Comment: Mild Fall Risk.   PMH:  HTN, hx of MI x 2, pacemaker/defibrilator, COPD, emphysema, OA, vascular disease, L THR, L ankle reconstruction. (No e-stim due to pacemaker)  Pain  Pain Assessment: 0-10  Pain Score: 6  Pain Type: Chronic pain  Pain Location: Back  Pain Orientation: Lower    Treatments:  Aquatics: 41'  TrA contraction 10 x 5\" holds  Side Stepping 4 laps  Hip Abd x15 P  Hip Flex x15 P  Heel Raises x15 P  Alt marching x15 P  Squats x15 P  Dumbbell Rolls x20 fwd/bwd N  Step Taps x15 N  UE Paddles Open 2x10  -push/pull, flex/ext, habd/hadd, abd/add  100% unloading 1 large " noodle  -bicycle 5'  -hip abd 5'  -hang 5'  Gradual exit 5'     OP EDUCATION:       Goals:  Active       PT Problem       PT Goal 1       Start:  10/25/23    Expected End:  23       LTG's:    1) Improve Lumbar AROM from  Miller 70% ext and 50% b/l SB  to Miller <= 50% ext and 30% b/l SB in order to facilitate improved gait and mobility.       4-6 weeks      2) Improve Core and B/L hip/ LE strength from 4/5  to >= 4+/5 throughout in order to facilitate safe gait and mobility.         4-6 weeks      3) Improve Modified Oswestry score by >= 5 points in order to improve QOL.     4-6 weeks      4) Improve LBP from  8/10 to <= 3 /10 with activity in order to improve QOL.      4-6 weeks      5) Pt will be able to walk longer distances, lift/carry objects and perform standing activity in the home without significant limitation       4-6 weeks      ST)  Pt/caregiver will be I and consistent with HEP in order to maximize strength and flexibility.    2-3 weeks

## 2023-11-15 NOTE — PROGRESS NOTES
Cardiology Subsequent Encounter Clinic Note  Name: Horacio Gupta  MRN: [id number]  : 1960    CC: Heart failure with reduced ejection fraction    Active Issues:  63-year-old gentleman with a past medical history of coronary artery disease status post PCI (RCA 2016), ACC/AHA stage C HFrEF (mixed cardiomyopathy, with last known ejection fraction 30-35% May 2023), status post ICD (2016), ascending aorta aneurysm with moderate central jet of aortic regurgitation, hypertension, obstructive sleep apnea, COPD, ankylosing spondylitis, ulcerative colitis. Patient has previously been seen by advanced heart failure at [facility] (Dr. Delatorre), and Dr. Mason. Here to establish care for heart failure:     Problem #1 ACC/AHA stage C HFrEF  -Current GDMT includes Toprol 50 mg daily, lisinopril 20 mg daily, spironolactone 25 mg daily  -Unable to tolerate Entresto secondary to hypotension. Unable to tolerate Jardiance due to headaches     Problem #2 coronary artery disease as detailed above  -Currently on Plavix/statin     Problem #3 aortic root dilatation with secondary aortic regurgitation (moderate), history of ascending aortic aneurysm  -Follows with Dr. Montaño    Since his last appointment the patient underwent a regadenoson stress test which was negative for reversible perfusion defects.  His chest tightness and shortness of breath have markedly improved since he was put on a prednisone taper.  He does note some shortness of breath with exertion at present but it is largely at baseline.  Denies any orthopnea/PND.  Does not appear to have any lower extremity edema.  Denies any chest pain.    Past Medical History  Past Medical History:   Diagnosis Date    Ankylosing spondylitis of unspecified sites in spine (CMS/[facility]) 2022    Ankylosing spondylitis    Aortic aneurysm of unspecified site, without rupture (CMS/[facility]) 2022    Aortic aneurysm    Atherosclerotic heart disease of native coronary artery without  angina pectoris 07/27/2022    CAD (coronary artery disease)    Chronic obstructive pulmonary disease, unspecified (CMS/[facility]) 10/11/2022    Chronic obstructive pulmonary disease    Essential (primary) hypertension 10/11/2022    HTN (hypertension), benign    Gastro-esophageal reflux disease without esophagitis 10/11/2022    GERD (gastroesophageal reflux disease)    Noninfective gastroenteritis and colitis, unspecified 06/08/2022    Inflammatory bowel diseases (IBD)    Obstructive sleep apnea (adult) (pediatric) 10/27/2020    Obstructive sleep apnea    Other idiopathic scoliosis, site unspecified 08/17/2020    Idiopathic scoliosis in adult patient    Pure hypercholesterolemia, unspecified 06/08/2022    Hypercholesterolemia    Ulcerative colitis, unspecified, without complications (CMS/[facility]) 10/11/2022    Ulcerative colitis    Unilateral inguinal hernia, without obstruction or gangrene, not specified as recurrent 12/10/2019    Right inguinal hernia    Unilateral primary osteoarthritis, right hip 03/05/2020    Arthritis of right hip    Unspecified systolic (congestive) heart failure (CMS/[facility]) 10/11/2022    Systolic heart failure, ACC/AHA stage C    Ventral hernia without obstruction or gangrene 11/25/2019    Ventral hernia    Vitamin D deficiency, unspecified 10/11/2022    Vitamin D deficiency       Past Surgical History  Past Surgical History:   Procedure Laterality Date    ANKLE SURGERY      CARDIAC DEFIBRILLATOR PLACEMENT      COLONOSCOPY      CORONARY ANGIOPLASTY WITH STENT PLACEMENT  01/20/2017    Cath Stent Placement    ESOPHAGOGASTRODUODENOSCOPY      OTHER SURGICAL HISTORY  07/19/2021    Intra-articular corticosteroid injection    OTHER SURGICAL HISTORY  12/10/2019    Finger surgical procedure       Medications  Current Outpatient Medications on File Prior to Visit   Medication Sig Dispense Refill    albuterol 2.5 mg /3 mL (0.083 %) nebulizer solution Per instructions DAILY (route: inhalation)      albuterol 90  mcg/actuation inhaler Inhale 2 puffs  in the morning and 2 puffs at noon and 2 puffs in the evening and 2 puffs before bedtime. As directed. 18 g 11    aspirin 81 mg capsule Take 1 tablet by mouth once daily. Only takes when he goes off the plavix      atorvastatin (Lipitor) 80 mg tablet Take 1 tablet (80 mg) by mouth once daily at bedtime.      budesonide-formoteroL (Symbicort) 160-4.5 mcg/actuation inhaler Inhale 2 puffs 2 times a day. Rinse mouth after use      bumetanide (Bumex) 1 mg tablet Take 1 tablet (1 mg) by mouth once daily as needed (For 3 lb weight gain or +2 edema).      calcium carbonate-vitamin D3 600 mg-5 mcg (200 unit) tablet Take 1 tablet by mouth once daily.      cetirizine (ZyrTEC) 10 mg tablet Take 1 tablet (10 mg) by mouth 1 time if needed.      cholecalciferol (VITAMIN D-3) 10 mcg (400 unit) tablet Take 12.5 tablets (5,000 Units) by mouth every other day.      clopidogrel (Plavix) 75 mg tablet Take 1 tablet (75 mg) by mouth once daily.      diclofenac sodium (Voltaren) 1 % gel gel APPLY 1 APPLICATION TOPICALLY 4 TIMES DAILY 100 g 0    docusate sodium (Colace) 100 mg capsule Take 1 capsule (100 mg) by mouth 2 times a day.      fexofenadine (Allegra) 180 mg tablet Take 1 tablet (180 mg) by mouth 1 time if needed.      gabapentin (Neurontin) 300 mg capsule Take 1 capsule (300 mg) by mouth 3 times a day.      guaiFENesin (Mucinex) 600 mg 12 hr tablet Take 1 tablet (600 mg) by mouth if needed (bid).      infliximab (Remicade) 100 mg recon soln Infuse 100 mg into a venous catheter.      lactobacillus acidophilus (acidophilus-pectin, citrus) tablet tablet Take 2 tablets by mouth once daily. 180 tablet 3    lisinopril 20 mg tablet Take 1 tablet (20 mg) by mouth once daily.      magnesium oxide (Mag-Ox) 400 mg (241.3 mg magnesium) tablet Take 1 tablet (400 mg) by mouth once daily.      metoprolol succinate XL (Toprol-XL) 100 mg 24 hr tablet Take 1 tablet (100 mg) by mouth once daily.      multivitamin  tablet Take 1 tablet by mouth once daily.      nitroglycerin (Nitrostat) 0.4 mg SL tablet Place 1 tablet (0.4 mg) under the tongue if needed for chest pain.      omeprazole (PriLOSEC) 20 mg DR capsule Take 1 capsule (20 mg) by mouth once daily. 90 capsule 3    polyethylene glycol (Glycolax) 17 gram/dose powder Take 17 g by mouth if needed. Mix 1 packet in 8 ounces of liquid and drink once daily      spironolactone (Aldactone) 25 mg tablet Take 1 tablet (25 mg) by mouth once daily.      tiotropium (Spiriva with HandiHaler) 18 mcg inhalation capsule Place 1 capsule (18 mcg) into inhaler and inhale once daily.      triamcinolone (Kenalog) 0.025 % ointment Apply topically to affected (knees)  area once a day, As Needed      albuterol 2.5 mg /3 mL (0.083 %) nebulizer solution Inhale 3 mL (2.5 mg) every 6 hours if needed for shortness of breath.      cetirizine (ZyrTEC) 10 mg tablet Take 1 tablet (10 mg) by mouth once daily. As directed      cholecalciferol (Vitamin D-3) 125 MCG (5000 UT) capsule Take 1 capsule (125 mcg) by mouth every other day.      cyclobenzaprine (Flexeril) 10 mg tablet Take 1 tablet (10 mg) by mouth 2 times a day as needed for muscle spasms.      DULoxetine (Cymbalta) 30 mg DR capsule Take 1 capsule (30 mg) by mouth once daily.      gabapentin (Neurontin) 400 mg capsule Take 1 capsule (400 mg) by mouth 3 times a day.      lactobacillus acidophilus 500 million cell capsule Take 2 capsules by mouth once daily.      lidocaine (Lidoderm) 5 % patch Place 1 patch on the skin once daily. Apply patch to the affected area and leave in place for 12 hours, then remove and leave off for 12 hours. Remove & discard patch within 12 hours or as directed by MD.      LORazepam (Ativan) 1 mg tablet TAKE ONE TABLET BY MOUTH ONE HOUR BEFORE PROCEDURES      traMADol (Ultram) 50 mg tablet TAKE 1 TABLET BY MOUTH THREE TIMES DAILY AS NEEDED (TAKE WITH TYLENOL 1000MG THREE TIMES DAILY)      triamcinolone (Kenalog) 0.5 % cream  "1 Application twice a day. Apply sparingly and massage in twice daily       No current facility-administered medications on file prior to visit.       Allergies  Allergies   Allergen Reactions    Empagliflozin Headache    Penicillins Hives and Angioedema     Angioedema    Ranolazine Unknown    Sacubitril-Valsartan Other and Headache     hypoglycemia    Sulfamethoxazole Headache       Social History  Social History     Tobacco Use    Smoking status: Every Day     Packs/day: .25     Types: Cigarettes     Passive exposure: Current    Smokeless tobacco: Never   Vaping Use    Vaping Use: Never used   Substance Use Topics    Alcohol use: Not Currently    Drug use: Never       Family History  Family History   Problem Relation Name Age of Onset    Stroke Mother      Diabetes Mother      Heart failure Mother      Hypertension Mother      Heart failure Father      Hypertension Father      Diabetes Brother      Other (C A B G x1) Brother         Physical Examination  Vitals: /68   Pulse 71   Ht 1.6 m (5' 3\")   Wt 65.8 kg (145 lb)   SpO2 94%   BMI 25.69 kg/m²   General: awake, alert and oriented. No acute distress.   Skin: Skin is warm, dry and intact without rashes or lesions. Appropriate color for ethnicity. Nail beds pink with no cyanosis or clubbing  HEENT: normocephalic, atraumatic; conjunctivae are clear without exudates or hemorrhage. Sclera is non-icteric. Eyelids are normal in appearance without swelling or lesions. Hearing intact. Nares are patent bilaterally. Moist mucous membranes.   Cardiovascular: Regular. No murmurs, gallops, or rubs are auscultated. S1 and S2 are heard and are of normal intensity. No JVD, no carotid bruits  Respiratory: Reduced breath sounds bilaterally: No crackles, wheezes or ronchi.   Gastrointestinal: soft, non-distended, BS + x 4  Genitourinary: exam deferred  Musculoskeletal: moves all extremities  Extremities: pulses palpable bilaterally; no swelling or erythema; no " edema  Neurological: alert & oriented x 3; no focal deficits  Psychiatric: appropriate mood and affect       Labs/Imaging/Procedures    Lab Results   Component Value Date    HGB 17.3 10/11/2023    HGB 16.5 09/14/2023    HGB 15.3 04/20/2023    HGB 10.4 (L) 02/09/2023     10/11/2023    WBC 7.6 10/11/2023     10/11/2023    K 4.1 10/11/2023    CREATININE 0.88 10/11/2023    CREATININE 0.86 09/14/2023    CREATININE 0.75 08/22/2023    CREATININE 0.69 04/20/2023    BUN 16 10/11/2023    CALCIUM 9.6 10/11/2023    INR 0.9 02/07/2023    BNP 35 08/05/2021    TROPHS 14 02/03/2023    TROPHS 7 02/03/2023    TROPHS 7 02/03/2023    LDLF 88 01/16/2023     No echocardiogram results found for the past 12 months  Nuclear Stress Test  Narrative: Interpreted By:  Marco Antonio Amador and Ohs Zachary   STUDY:  NUCLEAR STRESS TEST;  10/9/2023 8:00 am      INDICATION:  Signs/Symptoms:Dyspnea/chest tightness.      COMPARISON:  Echocardiogram dated 05/02/2023, CT chest dated 05/08/2023.      ACCESSION NUMBER(S):  [id number]      ORDERING CLINICIAN:  MAKENZIE RAY      TECHNIQUE:  DIVISION OF NUCLEAR MEDICINE  PHARMACOLOGIC STRESS MYOCARDIAL PERFUSION SCAN, ONE DAY PROTOCOL      The patient received an intravenous dose of  8.6 mCi of Tc-99m  Myoview and resting emission tomographic (SPECT) images of the  myocardium were acquired. The patient then received an intravenous  infusion of 0.4mg regadenoson (Lexiscan)  followed by an additional  dose of  26.4 mCi of Tc-99m  Myoview. Stress phase SPECT images of  the myocardium were then acquired. These included ECG-gated images to  assess and quantify ventricular function.  A low-dose, nondiagnostic  regional CT was utilized for attenuation correction purposes.      FINDINGS:  Stress and rest images show a midsized to large territory of fixed  activity defect in mid anterior and apical anterolateral walls  extending into the apex.      The left ventricle is mildly enlarged in size.       Gated images demonstrate decreased LV wall motion in the region of  the above described perfusion defects with an LV EF estimated at 40%.      Attenuation correction CT images demonstrate bilateral emphysema,  severe coronary and aortic calcifications as previously described on  CT chest dated 05/08/2023.      Impression: 1. Midsized to large territory of fixed perfusion defect in mid  anterior and apical anterolateral walls extending into the apex  consistent with prior infarct.  2. The left ventricle is mildly enlarged in size with an  end-diastolic volume of 125 mL.  3. Decreased wall motion in the mid anterior and apicolateral walls  in the regions of perfusion defects with an LV EF estimated at 40%.          I personally reviewed the images/study and I agree with the findings  as stated. This study was interpreted at [place], [place].      MACRO:  None      Signed by: Marco Antonio Amador 10/9/2023 11:43 AM  Dictation workstation:   [id number]    Cardiac MRI August 2020  1. Normal LV size (EDVi=92ml/m2) with moderately impaired systolic   function (LVEF=34%).   2. The mid anterior/anterolateral walls are akinetic with global   hypokinesis elsewhere.   3. Moderate central aortic regurgitation.   4. Moderately dilated ascending aorta (4.3 cm), normal aortic root.   5. Transmural LAD territory infarction of the mid   anterior/anterolateral wall with contiguous regions of  subendocardial infarction.     Echocardiogram dated May 2023 shows globally hypokinetic left ventricle with an EF of 30-35%    Impression  63-year-old gentleman with a past medical history of coronary artery disease status post PCI (RCA 4/2016), ACC/AHA stage C HFrEF (mixed cardiomyopathy, with last known ejection fraction 30-35% May 2023), status post ICD (8/2016), ascending aorta aneurysm with moderate central jet of aortic regurgitation, hypertension, obstructive sleep apnea, COPD, ankylosing  "spondylitis, ulcerative colitis. Patient has previously been seen by advanced heart failure at [facility] (Dr. Delatorre), and Dr. Mason. Here to establish care for heart failure:     Problem #1 ACC/AHA stage C HFrEF  -Current GDMT includes Toprol 50 mg daily, lisinopril 20 mg daily, spironolactone 25 mg daily  -Unable to tolerate Entresto secondary to hypotension. Unable to tolerate Jardiance due to headaches  -Continue current medications. I am hesitant to uptitrate the lisinopril since he does admit to occasional dizziness.     Problem #2 coronary artery disease as detailed above  -Currently on Plavix/statin; LDL slightly above goal.  Will observe for now    Problem #3 aortic root dilatation with secondary aortic regurgitation (moderate), history of ascending aortic aneurysm   -Follows with Dr. Montaño    Plan:  -At his last visit the patient was complaining of chest tightness/shortness of breath for which a regadenoson stress test was ordered and was negative (October 9, 2023).  Given the response to prednisone it was likely attributable to a COPD flareup (likely provoked by the viral infection)  -With improving symptoms no further work-up required at present.  Appears \"warm and dry\" from a heart failure standpoint  -RTC 6 months      Wilbur Polk MD  Advanced Heart Failure/Transplant Cardiology  Cardio-Oncology  [facility] [place]    "

## 2023-11-16 ENCOUNTER — TREATMENT (OUTPATIENT)
Dept: PHYSICAL THERAPY | Facility: CLINIC | Age: 63
End: 2023-11-16
Payer: MEDICARE

## 2023-11-16 ENCOUNTER — OFFICE VISIT (OUTPATIENT)
Dept: CARDIOLOGY | Facility: CLINIC | Age: 63
End: 2023-11-16
Payer: MEDICARE

## 2023-11-16 VITALS
HEART RATE: 71 BPM | BODY MASS INDEX: 25.69 KG/M2 | SYSTOLIC BLOOD PRESSURE: 112 MMHG | HEIGHT: 63 IN | DIASTOLIC BLOOD PRESSURE: 68 MMHG | OXYGEN SATURATION: 94 % | WEIGHT: 145 LBS

## 2023-11-16 DIAGNOSIS — I50.20 SYSTOLIC HEART FAILURE, ACC/AHA STAGE C (MULTI): ICD-10-CM

## 2023-11-16 DIAGNOSIS — I71.21 ANEURYSM OF ASCENDING AORTA WITHOUT RUPTURE (CMS-HCC): ICD-10-CM

## 2023-11-16 DIAGNOSIS — I25.118 CORONARY ARTERY DISEASE OF NATIVE ARTERY OF NATIVE HEART WITH STABLE ANGINA PECTORIS (CMS-HCC): Primary | ICD-10-CM

## 2023-11-16 DIAGNOSIS — M45.0 ANKYLOSING SPONDYLITIS OF MULTIPLE SITES IN SPINE (MULTI): ICD-10-CM

## 2023-11-16 PROCEDURE — 3074F SYST BP LT 130 MM HG: CPT | Performed by: STUDENT IN AN ORGANIZED HEALTH CARE EDUCATION/TRAINING PROGRAM

## 2023-11-16 PROCEDURE — 4004F PT TOBACCO SCREEN RCVD TLK: CPT | Performed by: STUDENT IN AN ORGANIZED HEALTH CARE EDUCATION/TRAINING PROGRAM

## 2023-11-16 PROCEDURE — 97113 AQUATIC THERAPY/EXERCISES: CPT | Mod: GP,CQ

## 2023-11-16 PROCEDURE — 3078F DIAST BP <80 MM HG: CPT | Performed by: STUDENT IN AN ORGANIZED HEALTH CARE EDUCATION/TRAINING PROGRAM

## 2023-11-16 PROCEDURE — 99214 OFFICE O/P EST MOD 30 MIN: CPT | Performed by: STUDENT IN AN ORGANIZED HEALTH CARE EDUCATION/TRAINING PROGRAM

## 2023-11-16 ASSESSMENT — PAIN - FUNCTIONAL ASSESSMENT: PAIN_FUNCTIONAL_ASSESSMENT: 0-10

## 2023-11-16 ASSESSMENT — PAIN SCALES - GENERAL: PAINLEVEL_OUTOF10: 7

## 2023-11-21 ENCOUNTER — TREATMENT (OUTPATIENT)
Dept: PHYSICAL THERAPY | Facility: CLINIC | Age: 63
End: 2023-11-21
Payer: MEDICARE

## 2023-11-21 DIAGNOSIS — M45.0 ANKYLOSING SPONDYLITIS OF MULTIPLE SITES IN SPINE (MULTI): ICD-10-CM

## 2023-11-21 PROCEDURE — 97113 AQUATIC THERAPY/EXERCISES: CPT | Mod: GP,CQ

## 2023-11-21 ASSESSMENT — PAIN SCALES - GENERAL: PAINLEVEL_OUTOF10: 6

## 2023-11-21 ASSESSMENT — PAIN - FUNCTIONAL ASSESSMENT: PAIN_FUNCTIONAL_ASSESSMENT: 0-10

## 2023-11-21 NOTE — PROGRESS NOTES
"Physical Therapy Treatment    Patient Name: Horacio Gupta  MRN: [id number]  Today's Date: 11/21/2023  Time Calculation  Start Time: 1045  Stop Time: 1130  Time Calculation (min): 45 min      Assessment: Patient identified by name and date of birth.  Patient able to resume LE kicks today with good tolerance. Performed in small range to keep pain from increasing. Patient able to perform squats with minimal cues for form. Able to perform all UE/LE exercises with fair trunk control. Pain did not decrease after today's session.       Plan: Continue with core and LE strengthening if next session, progressing if appropriate to allow for improved ability to transfer stairs.   JW  Treatment/Interventions: Aquatic therapy, Cryotherapy, Hot pack, Education/ Instruction, Manual therapy, Therapeutic exercises  PT Plan: Skilled PT  PT Frequency: 2 times per week  Duration: 2x/wk for 4 weeks or 9 total sessions counting PT eval  Rehab Potential: Good  Plan of Care Agreement: Patient       Current Problem  1. Ankylosing spondylitis of multiple sites in spine (CMS/[facility])  Follow Up In Physical Therapy          Subjective   Patient states that he is doing better than last week, states that he has been taking it easier and allow himself to rest. States that pain levels are 6/10 today in low back. No changes in pain at end of session.  Precautions  Precautions  Precautions Comment: Precautions Comment: Mild Fall Risk.   PMH:  HTN, hx of MI x 2, pacemaker/defibrilator, COPD, emphysema, OA, vascular disease, L THR, L ankle reconstruction. (No e-stim due to pacemaker)  Pain  Pain Assessment: 0-10  Pain Score: 6  Pain Type: Chronic pain    Treatments:  Aquatics: 43'  TrA contraction 10 x 5\" holds  Side Stepping 4 laps  Hip Abd x15   Hip Flex x15   Heel Raises x20 P   Alt marching x20 P  Squats x15   Dumbbell Rolls x20 fwd/bwd   Paddle Board push/pull, flex/ext x15 N  Step Taps x15   UE Paddles Open 2x10  -push/pull, flex/ext, habd/hadd, " abd/add  100% unloading 1 large noodle  -bicycle 5'  -hip abd 5'  -hang 5'  Gradual exit 5'     OP EDUCATION:       Goals:  Active       PT Problem       PT Goal 1       Start:  10/25/23    Expected End:  23       LTG's:    1) Improve Lumbar AROM from  Miller 70% ext and 50% b/l SB  to Miller <= 50% ext and 30% b/l SB in order to facilitate improved gait and mobility.       4-6 weeks      2) Improve Core and B/L hip/ LE strength from 4/5  to >= 4+/5 throughout in order to facilitate safe gait and mobility.         4-6 weeks      3) Improve Modified Oswestry score by >= 5 points in order to improve QOL.     4-6 weeks      4) Improve LBP from  8/10 to <= 3 /10 with activity in order to improve QOL.      4-6 weeks      5) Pt will be able to walk longer distances, lift/carry objects and perform standing activity in the home without significant limitation       4-6 weeks      ST)  Pt/caregiver will be I and consistent with HEP in order to maximize strength and flexibility.    2-3 weeks

## 2023-11-22 ENCOUNTER — TREATMENT (OUTPATIENT)
Dept: PHYSICAL THERAPY | Facility: CLINIC | Age: 63
End: 2023-11-22
Payer: MEDICARE

## 2023-11-22 DIAGNOSIS — M45.0 ANKYLOSING SPONDYLITIS OF MULTIPLE SITES IN SPINE (MULTI): ICD-10-CM

## 2023-11-22 PROCEDURE — 97110 THERAPEUTIC EXERCISES: CPT | Mod: GP | Performed by: PHYSICAL THERAPIST

## 2023-11-22 ASSESSMENT — PAIN SCALES - GENERAL: PAINLEVEL_OUTOF10: 7

## 2023-11-22 ASSESSMENT — PAIN - FUNCTIONAL ASSESSMENT: PAIN_FUNCTIONAL_ASSESSMENT: 0-10

## 2023-11-22 NOTE — PROGRESS NOTES
Physical Therapy Reassessment/Treatment    Patient Name: Horacio Gupta  MRN: [id number]  Today's Date: 11/22/2023  Time Calculation  Start Time: 1400  Stop Time: 1430  Time Calculation (min): 30 min      Assessment:   The pt has made good objective progress in PT with improved lumbar/core/b/l hip/knee strength, ROM/flexibility.  The pt has MET or PARTIALLY MET some of his PT goals and is I with current HEP.  Pt reports that the aquatic PT rx feels good on his low back and gives him several hours of pain relief afterwards.  The pt still has significant strength and ROM/flexibility deficits and would benefit from continuing aquatic PT rx.  Continue PT as per current POC 2x/wk for 4 more weeks or 8 more sessions.      Plan:   Treatment/Interventions: Aquatic therapy, Cryotherapy, Hot pack, Education/ Instruction, Manual therapy, Therapeutic exercises  PT Plan: Skilled PT  PT Frequency: 2 times per week  Duration: 2x/wk for 4 weeks or 9 total sessions counting PT eval  Rehab Potential: Good  Plan of Care Agreement: Patient    11/22/2023 PT Reassessment:    The pt has made good objective progress in PT with improved lumbar/core/b/l hip/knee strength, ROM/flexibility.  The pt has MET or PARTIALLY MET some of his PT goals and is I with current HEP.  Pt reports that the aquatic PT rx feels good on his low back and gives him several hours of pain relief afterwards.  The pt still has significant strength and ROM/flexibility deficits and would benefit from continuing aquatic PT rx.  Continue PT as per current POC 2x/wk for 4 more weeks or 8 more sessions.    Current Problem  Problem List Items Addressed This Visit             ICD-10-CM       Musculoskeletal and Injuries    Ankylosing spondylitis of multiple sites in spine (CMS/HCC) M45.0       Subjective  Pt reports that he feels aquatic rx is really helping with his Low back pain with several hours of pain relief after sessions. Pt reports that he would like to continue  "with PT rx if possible.    Precautions  Precautions  Precautions Comment: Precautions Comment: Mild Fall Risk.   PMH:  HTN, hx of MI x 2, pacemaker/defibrilator, COPD, emphysema, OA, vascular disease, L THR, L ankle reconstruction. (No e-stim due to pacemaker)    Pain  Pain Assessment: 0-10  Pain Score: 7  Pain Location: Back  Pain Orientation: Lower      Treatments:  There Ex:  29 min  2 units  Hooklying SKTC  5 sec hold x 5 reps ea  Hooklying Piriformis/Glut Stretch   20 sec hold x 2 ea  Hooklying Lateral Trunk Rotation  (small comfortable arc of motion)   2 min  HEP review.  PT Reassessment Completed.      Aquatics: X today  TrA contraction 10 x 5\" holds  Side Stepping 4 laps  Hip Abd x15   Hip Flex x15   Heel Raises x20 P   Alt marching x20 P  Squats x15   Dumbbell Rolls x20 fwd/bwd   Paddle Board push/pull, flex/ext x15 N  Step Taps x15   UE Paddles Open 2x10  -push/pull, flex/ext, habd/hadd, abd/add  100% unloading 1 large noodle  -bicycle 5'  -hip abd 5'  -hang 5'  Gradual exit 5'     OP EDUCATION:   Edu on proper form and speed of movement with ex's.  Pt verbalized/demonstrated good understanding.      Goals:  Active       PT Problem       PT Goal 1       Start:  10/25/23    Expected End:  11/25/23       LTG's:    1) Improve Lumbar AROM from  Miller 70% ext and 50% b/l SB  to Miller <= 50% ext and 30% b/l SB in order to facilitate improved gait and mobility.       4-6 weeks  11/22/2023, NOT MET, Lumbar AROM Miller 70% ext and 50% b/l SB    2) Improve Core and B/L hip/ LE strength from 4/5  to >= 5-/5 throughout in order to facilitate safe gait and mobility.         4-6 weeks  11/22/2023, PARTIALLY MET, B/L LE strength 4 to 4+/5 throughout except L hip flex 4/5    3) Improve Modified Oswestry score by >= 5 points in order to improve QOL.     4-6 weeks  11/22/2023, NOT MET, Pt scored a 40% at baseline and a 40% at reassessment     4) Improve LBP from  8/10 to <= 3 /10 with activity in order to improve QOL.      4-6 " weeks  2023, PARTIALLY MET, pain range  6-8/10    5) Pt will be able to walk longer distances, lift/carry objects and perform standing activity in the home without significant limitation       4-6 weeks  2023, PARTIALLY MET, Pt reports mild improvement in the above activities but still does have pain and is cautious not to over do it.      ST)  Pt/caregiver will be I and consistent with HEP in order to maximize strength and flexibility.    2-3 weeks  2023, MET, pt is I with HEP and has handouts.

## 2023-11-27 ENCOUNTER — TREATMENT (OUTPATIENT)
Dept: PHYSICAL THERAPY | Facility: CLINIC | Age: 63
End: 2023-11-27
Payer: MEDICARE

## 2023-11-27 DIAGNOSIS — M45.0 ANKYLOSING SPONDYLITIS OF MULTIPLE SITES IN SPINE (MULTI): ICD-10-CM

## 2023-11-27 PROCEDURE — 97113 AQUATIC THERAPY/EXERCISES: CPT | Mod: GP,CQ | Performed by: PHYSICAL THERAPY ASSISTANT

## 2023-11-27 ASSESSMENT — PAIN - FUNCTIONAL ASSESSMENT: PAIN_FUNCTIONAL_ASSESSMENT: 0-10

## 2023-11-27 ASSESSMENT — PAIN SCALES - GENERAL: PAINLEVEL_OUTOF10: 7

## 2023-11-27 NOTE — PROGRESS NOTES
Physical Therapy Treatment    Patient Name: Horacio Gupta  MRN: [id number]  Today's Date: 11/27/2023  Time Calculation  Start Time: 1430  Stop Time: 1515  Time Calculation (min): 45 min      Assessment: Patient tolerated treatment without increased pain. Patient has good TrA and keeps intact with there-ex.  Patient has good form/understanding with there-ex and keeps body in good alignment.  Continue with core stability while performing dyn activity to decrease back pain.       Plan: Continue with core strength to improve sleep, standing, ambulation.       Current Problem  Problem List Items Addressed This Visit             ICD-10-CM       Musculoskeletal and Injuries    Ankylosing spondylitis of multiple sites in spine (CMS/[facility]) M45.0       Subjective Patient reports 7/10 low back pain. Centralized.      Precautions  Precautions  Precautions Comment: Precautions co  Pain  Pain Assessment: 0-10  Pain Score: 7  Pain Type: Chronic pain  Pain Location: Back  Pain Orientation: Lower    Treatments:  Physical Therapy Treatment    Patient Name: Horacio Gupta  MRN: [id number]  Today's Date: 11/21/2023  Time Calculation  Start Time: 1045  Stop Time: 1130  Time Calculation (min): 45 min             Plan: Continue with core and LE strengthening  to improve sleeping, standing, ADL.  Treatment/Interventions: Aquatic therapy, Cryotherapy, Hot pack, Education/ Instruction, Manual therapy, Therapeutic exercises  PT Plan: Skilled PT  PT Frequency: 2 times per week  Duration: 2x/wk for 4 weeks or 9 total sessions counting PT eval  Rehab Potential: Good  Plan of Care Agreement: Patient       Current Problem  1. Ankylosing spondylitis of multiple sites in spine (CMS/[facility])  Follow Up In Physical Therapy          Subjective   Patient states that he is doing better than last week, states that he has been taking it easier and allow himself to rest. States that pain levels are 7/10 today in low back. No changes in pain at end of  "session.  Precautions  Precautions  Precautions Comment: Precautions Comment: Mild Fall Risk.   PMH:  HTN, hx of MI x 2, pacemaker/defibrilator, COPD, emphysema, OA, vascular disease, L THR, L ankle reconstruction. (No e-stim due to pacemaker)  Pain  Pain Assessment: 0-10  Pain Score: 7  Pain Type: Chronic pain    Treatments:  Aquatics: 43'  TrA contraction 10 x 5\" holds  Side Stepping 4 laps  Hip Abd x15   Hip Flex x15   Heel Raises x20   Alt marching x20   Squats x15   Dumbbell Rolls x20 fwd/bwd   Paddle Board push/pull, flex/ext x15   Step Taps x15   UE Paddles Open 2x10  -push/pull, flex/ext, habd/hadd, abd/add  100% unloading 1 large noodle  -bicycle 5'  -hip abd 5'  -hang 5'  Gradual exit 5'          Goals:  Active       PT Problem       PT Goal 1       Start:  10/25/23    Expected End:  23       LTG's:    1) Improve Lumbar AROM from  Miller 70% ext and 50% b/l SB  to Miller <= 50% ext and 30% b/l SB in order to facilitate improved gait and mobility.       4-6 weeks      2) Improve Core and B/L hip/ LE strength from 4/5  to >= 4+/5 throughout in order to facilitate safe gait and mobility.         4-6 weeks      3) Improve Modified Oswestry score by >= 5 points in order to improve QOL.     4-6 weeks      4) Improve LBP from  8/10 to <= 3 /10 with activity in order to improve QOL.      4-6 weeks      5) Pt will be able to walk longer distances, lift/carry objects and perform standing activity in the home without significant limitation       4-6 weeks      ST)  Pt/caregiver will be I and consistent with HEP in order to maximize strength and flexibility.    2-3 weeks            Aquatic Exercise  Aquatic Exercise Performed: Yes    OP EDUCATION:       Goals:  Active       PT Problem       PT Goal 1       Start:  10/25/23    Expected End:  23       LTG's:    1) Improve Lumbar AROM from  Miller 70% ext and 50% b/l SB  to Miller <= 50% ext and 30% b/l SB in order to facilitate improved gait and mobility.       4-6 " weeks      2) Improve Core and B/L hip/ LE strength from 4/5  to >= 4+/5 throughout in order to facilitate safe gait and mobility.         4-6 weeks      3) Improve Modified Oswestry score by >= 5 points in order to improve QOL.     4-6 weeks      4) Improve LBP from  8/10 to <= 3 /10 with activity in order to improve QOL.      4-6 weeks      5) Pt will be able to walk longer distances, lift/carry objects and perform standing activity in the home without significant limitation       4-6 weeks      ST)  Pt/caregiver will be I and consistent with HEP in order to maximize strength and flexibility.    2-3 weeks

## 2023-11-28 ENCOUNTER — HOSPITAL ENCOUNTER (OUTPATIENT)
Dept: CARDIOLOGY | Facility: CLINIC | Age: 63
Discharge: HOME | End: 2023-11-28
Payer: MEDICARE

## 2023-11-28 DIAGNOSIS — I47.20 VT (VENTRICULAR TACHYCARDIA) (MULTI): ICD-10-CM

## 2023-11-28 DIAGNOSIS — I50.20 NYHA CLASS 3 HEART FAILURE WITH REDUCED EJECTION FRACTION (MULTI): ICD-10-CM

## 2023-11-28 DIAGNOSIS — I25.5 ISCHEMIC CARDIOMYOPATHY: ICD-10-CM

## 2023-11-28 DIAGNOSIS — Z95.810 PRESENCE OF AUTOMATIC CARDIOVERTER/DEFIBRILLATOR (AICD): ICD-10-CM

## 2023-11-28 PROCEDURE — 93295 DEV INTERROG REMOTE 1/2/MLT: CPT | Performed by: STUDENT IN AN ORGANIZED HEALTH CARE EDUCATION/TRAINING PROGRAM

## 2023-11-28 PROCEDURE — 93296 REM INTERROG EVL PM/IDS: CPT

## 2023-11-29 ENCOUNTER — TREATMENT (OUTPATIENT)
Dept: PHYSICAL THERAPY | Facility: CLINIC | Age: 63
End: 2023-11-29
Payer: MEDICARE

## 2023-11-29 DIAGNOSIS — M54.9 BACK PAIN: Primary | ICD-10-CM

## 2023-11-29 DIAGNOSIS — M45.0 ANKYLOSING SPONDYLITIS OF MULTIPLE SITES IN SPINE (MULTI): ICD-10-CM

## 2023-11-29 PROCEDURE — 97113 AQUATIC THERAPY/EXERCISES: CPT | Mod: GP,CQ | Performed by: PHYSICAL THERAPY ASSISTANT

## 2023-11-29 NOTE — PROGRESS NOTES
"Physical Therapy Treatment    Patient Name: Horacio Gupta  MRN: [id number]  Today's Date: 11/29/2023  Time Calculation  Start Time: 1315  Stop Time: 1400  Time Calculation (min): 45 min      Assessment:Patient tolerated treatment without increased back pain. Patient has good TrA and keeps intact with there-ex.  Patient needing one UE support with LE there-ex and with 100% unloading.  Patient has good form/understanding with there-ex and keeps body in good alignment.  Continue with core stability while performing dyn activity to decrease back pain.       Plan:Continue with core strength to improve sleeping, standing, ambulation/steps.       Current Problem  Problem List Items Addressed This Visit             ICD-10-CM       Musculoskeletal and Injuries    Back pain - Primary M54.9    Ankylosing spondylitis of multiple sites in spine (CMS/HCC) M45.0       Subjective   Patient reports low back pain of 7/10 today. Reports B LE's feeling \"weak\".  Reports using st cane when out in public.             Treatments:  Side Stepping 3 laps  Hip Abd x15   Hip Flex x15   Heel Raises x20   Alt marching x20   Squats x15   Dumbbell Rolls x20 fwd/bwd   Paddle Board push/pull, flex/ext x15   Step Taps x15   UE Paddles Open 2x10  -push/pull, flex/ext, habd/hadd, abd/add  100% unloading 1 large noodle  -bicycle 5'  -hip abd 5'  -hang 5'  Gradual exit 3'          Goals:  Active       PT Problem       PT Goal 1       Start:  10/25/23    Expected End:  11/25/23       LTG's:    1) Improve Lumbar AROM from  Miller 70% ext and 50% b/l SB  to Miller <= 50% ext and 30% b/l SB in order to facilitate improved gait and mobility.       4-6 weeks      2) Improve Core and B/L hip/ LE strength from 4/5  to >= 4+/5 throughout in order to facilitate safe gait and mobility.         4-6 weeks      3) Improve Modified Oswestry score by >= 5 points in order to improve QOL.     4-6 weeks      4) Improve LBP from  8/10 to <= 3 /10 with activity in order to " improve QOL.      4-6 weeks      5) Pt will be able to walk longer distances, lift/carry objects and perform standing activity in the home without significant limitation       4-6 weeks      ST)  Pt/caregiver will be I and consistent with HEP in order to maximize strength and flexibility.    2-3 weeks            Aquatic Exercise  Aquatic Exercise Performed: Yes       OP EDUCATION:       Goals:  Active       PT Problem       PT Goal 1       Start:  10/25/23    Expected End:  23       LTG's:    1) Improve Lumbar AROM from  Miller 70% ext and 50% b/l SB  to Miller <= 50% ext and 30% b/l SB in order to facilitate improved gait and mobility.       4-6 weeks      2) Improve Core and B/L hip/ LE strength from 4/5  to >= 4+/5 throughout in order to facilitate safe gait and mobility.         4-6 weeks      3) Improve Modified Oswestry score by >= 5 points in order to improve QOL.     4-6 weeks      4) Improve LBP from  8/10 to <= 3 /10 with activity in order to improve QOL.      4-6 weeks      5) Pt will be able to walk longer distances, lift/carry objects and perform standing activity in the home without significant limitation       4-6 weeks      ST)  Pt/caregiver will be I and consistent with HEP in order to maximize strength and flexibility.    2-3 weeks

## 2023-12-04 ENCOUNTER — TRANSCRIBE ORDERS (OUTPATIENT)
Dept: CARDIOLOGY | Facility: HOSPITAL | Age: 63
End: 2023-12-04
Payer: MEDICARE

## 2023-12-04 ENCOUNTER — TREATMENT (OUTPATIENT)
Dept: PHYSICAL THERAPY | Facility: CLINIC | Age: 63
End: 2023-12-04
Payer: MEDICARE

## 2023-12-04 DIAGNOSIS — M45.0 ANKYLOSING SPONDYLITIS OF MULTIPLE SITES IN SPINE (MULTI): ICD-10-CM

## 2023-12-04 PROCEDURE — 97113 AQUATIC THERAPY/EXERCISES: CPT | Mod: GP,CQ | Performed by: PHYSICAL THERAPY ASSISTANT

## 2023-12-04 ASSESSMENT — PAIN SCALES - GENERAL: PAINLEVEL_OUTOF10: 7

## 2023-12-04 ASSESSMENT — PAIN - FUNCTIONAL ASSESSMENT: PAIN_FUNCTIONAL_ASSESSMENT: 0-10

## 2023-12-04 NOTE — PROGRESS NOTES
Physical Therapy Treatment    Patient Name: Horacio Gupta  MRN: [id number]  Today's Date: 12/4/2023  Time Calculation  Start Time: 1300  Stop Time: 1345  Time Calculation (min): 45 min      Assessment:Patient tolerated treatment without increased pain. Patient has good TrA and keeps intact with there-ex.  Patient has good form/understanding with there-ex and keeps body in good alignment.  Continue with core stability while performing dyn activity to decrease fall risk.       Plan:Continue with core stability to improve sleep, standing, and ambulation.       Current Problem  Problem List Items Addressed This Visit             ICD-10-CM       Musculoskeletal and Injuries    Ankylosing spondylitis of multiple sites in spine (CMS/[facility]) M45.0       Subjective  Patient reports 7/10 low back pain. Reports no falls.       Precautions  Precautions  Precautions Comment: Precautions  Pain  Pain Assessment: 0-10  Pain Score: 7  Pain Type: Chronic pain  Pain Location: Back  Pain Orientation: Lower    Treatments:  Side Stepping 3 laps  Hip Abd x15   Hip Flex x15   Heel Raises x20   Alt marching x20   Squats x15   Dumbbell Rolls x20 fwd/bwd   Paddle Board push/pull, flex/ext x15   Step Taps x15   UE Paddles Open 2x10  -push/pull, flex/ext, habd/hadd, abd/add  100% unloading 1 large noodle  -bicycle 5'  -hip abd 5'  -hang 5'  Gradual exit 3'   Aquatic Exercise  Aquatic Exercise Performed: Yes           Goals:  Active       PT Problem       PT Goal 1       Start:  10/25/23    Expected End:  11/25/23       LTG's:    1) Improve Lumbar AROM from  Miller 70% ext and 50% b/l SB  to Miller <= 50% ext and 30% b/l SB in order to facilitate improved gait and mobility.       4-6 weeks      2) Improve Core and B/L hip/ LE strength from 4/5  to >= 4+/5 throughout in order to facilitate safe gait and mobility.         4-6 weeks      3) Improve Modified Oswestry score by >= 5 points in order to improve QOL.     4-6 weeks      4) Improve LBP from   8/10 to <= 3 /10 with activity in order to improve QOL.      4-6 weeks      5) Pt will be able to walk longer distances, lift/carry objects and perform standing activity in the home without significant limitation       4-6 weeks      ST)  Pt/caregiver will be I and consistent with HEP in order to maximize strength and flexibility.    2-3 weeks

## 2023-12-06 ENCOUNTER — TREATMENT (OUTPATIENT)
Dept: PHYSICAL THERAPY | Facility: CLINIC | Age: 63
End: 2023-12-06
Payer: MEDICARE

## 2023-12-06 DIAGNOSIS — M45.0 ANKYLOSING SPONDYLITIS OF MULTIPLE SITES IN SPINE (MULTI): ICD-10-CM

## 2023-12-06 PROCEDURE — 97113 AQUATIC THERAPY/EXERCISES: CPT | Mod: GP,CQ | Performed by: PHYSICAL THERAPY ASSISTANT

## 2023-12-06 ASSESSMENT — PAIN - FUNCTIONAL ASSESSMENT: PAIN_FUNCTIONAL_ASSESSMENT: 0-10

## 2023-12-06 ASSESSMENT — PAIN SCALES - GENERAL: PAINLEVEL_OUTOF10: 7

## 2023-12-06 NOTE — PROGRESS NOTES
Physical Therapy Treatment    Patient Name: Horacio Gupta  MRN: [id number]  Today's Date: 12/6/2023  Time Calculation  Start Time: 1315  Stop Time: 1400  Time Calculation (min): 45 min      Assessment: Patient tolerated treatment without increased pain. Patient has good TrA and keeps intact with there-ex.  Patient needing one UE support with LE there-ex and 100% unloading.  Patient has good form/understanding with there-ex and keeps body in good alignment.  Continue with core stability while performing dyn activity to decrease back pain.       Plan: Continue with core strength to improve sleep, standing, ambulation.       Current Problem  Problem List Items Addressed This Visit             ICD-10-CM       Musculoskeletal and Injuries    Ankylosing spondylitis of multiple sites in spine (CMS/HCC) M45.0       Subjective Patient reports no falls and states 7/10 lower back pain.            Pain  Pain Assessment: 0-10  Pain Score: 7  Pain Type: Chronic pain  Pain Location: Back  Pain Orientation: Lower    Treatments:  Side Stepping 3 laps  Hip Abd x15   Hip Flex x15   Heel Raises x20   Alt marching x20   Squats x15   Dumbbell Rolls x20 fwd/bwd   Paddle Board push/pull, flex/ext x15   Step Taps x15   UE Paddles Open 2x10  -push/pull, flex/ext, habd/hadd, abd/add  100% unloading 1 large noodle  -bicycle 5'  -hip abd 5'  -hang 5'  Gradual exit 3'   Aquatic Exercise  Aquatic Exercise Performed: Yes           Goals:  Active       PT Problem       PT Goal 1       Start:  10/25/23    Expected End:  11/25/23       LTG's:    1) Improve Lumbar AROM from  Miller 70% ext and 50% b/l SB  to Miller <= 50% ext and 30% b/l SB in order to facilitate improved gait and mobility.       4-6 weeks      2) Improve Core and B/L hip/ LE strength from 4/5  to >= 4+/5 throughout in order to facilitate safe gait and mobility.         4-6 weeks      3) Improve Modified Oswestry score by >= 5 points in order to improve QOL.     4-6 weeks      4)  Improve LBP from  8/10 to <= 3 /10 with activity in order to improve QOL.      4-6 weeks      5) Pt will be able to walk longer distances, lift/carry objects and perform standing activity in the home without significant limitation       4-6 weeks      ST)  Pt/caregiver will be I and consistent with HEP in order to maximize strength and flexibility.    2-3 weeks            Aquatic Exercise  Aquatic Exercise Performed: Yes           Goals:  Active       PT Problem       PT Goal 1       Start:  10/25/23    Expected End:  23       LTG's:    1) Improve Lumbar AROM from  Miller 70% ext and 50% b/l SB  to Miller <= 50% ext and 30% b/l SB in order to facilitate improved gait and mobility.       4-6 weeks      2) Improve Core and B/L hip/ LE strength from 4/5  to >= 4+/5 throughout in order to facilitate safe gait and mobility.         4-6 weeks      3) Improve Modified Oswestry score by >= 5 points in order to improve QOL.     4-6 weeks      4) Improve LBP from  8/10 to <= 3 /10 with activity in order to improve QOL.      4-6 weeks      5) Pt will be able to walk longer distances, lift/carry objects and perform standing activity in the home without significant limitation       4-6 weeks      ST)  Pt/caregiver will be I and consistent with HEP in order to maximize strength and flexibility.    2-3 weeks

## 2023-12-13 ENCOUNTER — TREATMENT (OUTPATIENT)
Dept: PHYSICAL THERAPY | Facility: CLINIC | Age: 63
End: 2023-12-13
Payer: MEDICARE

## 2023-12-13 DIAGNOSIS — M45.0 ANKYLOSING SPONDYLITIS OF MULTIPLE SITES IN SPINE (MULTI): ICD-10-CM

## 2023-12-13 PROCEDURE — 97113 AQUATIC THERAPY/EXERCISES: CPT | Mod: GP,CQ | Performed by: PHYSICAL THERAPY ASSISTANT

## 2023-12-13 ASSESSMENT — PAIN - FUNCTIONAL ASSESSMENT: PAIN_FUNCTIONAL_ASSESSMENT: 0-10

## 2023-12-13 ASSESSMENT — PAIN SCALES - GENERAL: PAINLEVEL_OUTOF10: 7

## 2023-12-13 NOTE — PROGRESS NOTES
Physical Therapy Treatment    Patient Name: Horacio Gupta  MRN: [id number]  Today's Date: 12/13/2023  Time Calculation  Start Time: 1315  Stop Time: 1400  Time Calculation (min): 45 min      Assessment:Patient has good TrA and keeps intact with there-ex. Patient has good form/understanding with there-ex and keeps body in good alignment.  Patient needing one UE support with LE there-ex and with 100% unloading.  Continue with core stability while performing dyn activity to decrease back pain.       Plan: Continue with core stability to improve sleep, standing, ambulation/ADL.       Current Problem  Problem List Items Addressed This Visit             ICD-10-CM       Musculoskeletal and Injuries    Ankylosing spondylitis of multiple sites in spine (CMS/[facility]) M45.0       Subjective  Patient reports 7/10 low back pain. Patient reports no falls.       Precautions  Precautions  Precautions Comment: Precautions  Pain  Pain Assessment: 0-10  Pain Score: 7  Pain Type: Chronic pain  Pain Location: Back  Pain Orientation: Lower    Treatments:  Side Stepping 3 laps  Hip Abd x15   Hip Flex x15   Heel Raises x20   Alt marching x20   Squats x15   Dumbbell Rolls x20 fwd/bwd   Paddle Board push/pull, flex/ext x15   Step Taps x15   UE Paddles Open 2x10  -push/pull, flex/ext, habd/hadd, abd/add  100% unloading 1 large noodle  -bicycle 5'  -hip abd 5'  -hang 5'  Gradual Exit 2'    OP EDUCATION:       Goals:  Active       PT Problem       PT Goal 1       Start:  10/25/23    Expected End:  11/25/23       LTG's:    1) Improve Lumbar AROM from  Miller 70% ext and 50% b/l SB  to Miller <= 50% ext and 30% b/l SB in order to facilitate improved gait and mobility.       4-6 weeks      2) Improve Core and B/L hip/ LE strength from 4/5  to >= 4+/5 throughout in order to facilitate safe gait and mobility.         4-6 weeks      3) Improve Modified Oswestry score by >= 5 points in order to improve QOL.     4-6 weeks      4) Improve LBP from  8/10 to  <= 3 /10 with activity in order to improve QOL.      4-6 weeks      5) Pt will be able to walk longer distances, lift/carry objects and perform standing activity in the home without significant limitation       4-6 weeks      ST)  Pt/caregiver will be I and consistent with HEP in order to maximize strength and flexibility.    2-3 weeks

## 2023-12-15 ENCOUNTER — TREATMENT (OUTPATIENT)
Dept: PHYSICAL THERAPY | Facility: CLINIC | Age: 63
End: 2023-12-15
Payer: MEDICARE

## 2023-12-15 DIAGNOSIS — M45.0 ANKYLOSING SPONDYLITIS OF MULTIPLE SITES IN SPINE (MULTI): ICD-10-CM

## 2023-12-15 PROCEDURE — 97113 AQUATIC THERAPY/EXERCISES: CPT | Mod: GP,CQ | Performed by: PHYSICAL THERAPY ASSISTANT

## 2023-12-15 ASSESSMENT — PAIN - FUNCTIONAL ASSESSMENT: PAIN_FUNCTIONAL_ASSESSMENT: 0-10

## 2023-12-15 ASSESSMENT — PAIN SCALES - GENERAL: PAINLEVEL_OUTOF10: 7

## 2023-12-15 NOTE — PROGRESS NOTES
Physical Therapy Treatment    Patient Name: Horacio Gupta  MRN: [id number]  Today's Date: 12/15/2023  Time Calculation  Start Time: 1315  Stop Time: 1400  Time Calculation (min): 45 min      Assessment:Patient tolerated treatment without increased pain.  Patient has good TrA and keeps intact with there-ex. Patient has good form/understanding with there-ex and keeps body in good alignment. Patient needing one UE support with LE the-ex and with 100% unloading. Continue with core stability while performing dyn activity to decrease back pain.       Plan:Continue with core strength to improve sleep, standing, ambulation.       Current Problem  Problem List Items Addressed This Visit             ICD-10-CM       Musculoskeletal and Injuries    Ankylosing spondylitis of multiple sites in spine (CMS/HCC) M45.0       Subjective   Patient reports 7/10 low back pain. Patient reports no falls.     Precautions  Precautions  Precautions Comment: Precautions  Pain  Pain Assessment: 0-10  Pain Score: 7  Pain Type: Chronic pain  Pain Location: Back  Pain Orientation: Lower    Treatments:  Side Stepping 3 laps  Hip Abd x 17  Hip Flex x 17  Heel Raises x20   Alt marching x20   Squats x 17  Dumbbell Rolls x 40 fwd/bwd   Paddle Board push/pull, flex/ext x 17  Step Taps x 17  UE Paddles Open 2x10  -push/pull, flex/ext, habd/hadd, abd/add  100% unloading 1 large noodle  -bicycle 5'  -hip abd 5'  -hang 5'  Gradual Exit 2'  Aquatic Exercise  Aquatic Exercise Performed: Yes    OP EDUCATION:       Goals:  Active       PT Problem       PT Goal 1       Start:  10/25/23    Expected End:  11/25/23       LTG's:    1) Improve Lumbar AROM from  Miller 70% ext and 50% b/l SB  to Miller <= 50% ext and 30% b/l SB in order to facilitate improved gait and mobility.       4-6 weeks      2) Improve Core and B/L hip/ LE strength from 4/5  to >= 4+/5 throughout in order to facilitate safe gait and mobility.         4-6 weeks      3) Improve Modified Oswestry  score by >= 5 points in order to improve QOL.     4-6 weeks      4) Improve LBP from  8/10 to <= 3 /10 with activity in order to improve QOL.      4-6 weeks      5) Pt will be able to walk longer distances, lift/carry objects and perform standing activity in the home without significant limitation       4-6 weeks      ST)  Pt/caregiver will be I and consistent with HEP in order to maximize strength and flexibility.    2-3 weeks

## 2023-12-18 ENCOUNTER — TREATMENT (OUTPATIENT)
Dept: PHYSICAL THERAPY | Facility: CLINIC | Age: 63
End: 2023-12-18
Payer: MEDICARE

## 2023-12-18 DIAGNOSIS — M45.0 ANKYLOSING SPONDYLITIS OF MULTIPLE SITES IN SPINE (MULTI): ICD-10-CM

## 2023-12-18 PROCEDURE — 97113 AQUATIC THERAPY/EXERCISES: CPT | Mod: GP,CQ | Performed by: PHYSICAL THERAPY ASSISTANT

## 2023-12-18 ASSESSMENT — PAIN - FUNCTIONAL ASSESSMENT: PAIN_FUNCTIONAL_ASSESSMENT: 0-10

## 2023-12-18 ASSESSMENT — PAIN SCALES - GENERAL: PAINLEVEL_OUTOF10: 7

## 2023-12-18 NOTE — PROGRESS NOTES
Physical Therapy Treatment    Patient Name: Horacio Gupta  MRN: [id number]  Today's Date: 12/18/2023  Time Calculation  Start Time: 1315  Stop Time: 1400  Time Calculation (min): 45 min      Assessment:   Patient tolerated treatment without increased pain.  Patient has good TrA and keeps intact with there-ex.  Patient needing one UE support with LE there-ex and with 100% unloading.  Patient has good form/understanding with there-ex and keeps body in good alignment.  Continue with core stability while performing dyn activity to decrease back pain.    Plan: Continue with core strength to improve sleep, standing, ambulation.       Current Problem  Problem List Items Addressed This Visit             ICD-10-CM       Musculoskeletal and Injuries    Ankylosing spondylitis of multiple sites in spine (CMS/HCC) M45.0       Subjective   Patient reports no falls and states low back pain of 7/10.     Precautions  Precautions  Precautions Comment: Precautions  Pain  Pain Assessment: 0-10  Pain Score: 7  Pain Type: Chronic pain  Pain Location: Back  Pain Orientation: Lower    Treatments:  Treatments:  Side Stepping 3 laps  Hip Abd x 17  Hip Flex x 17  Heel Raises x20   Alt marching x20   Squats x 17  Dumbbell Rolls x 40 fwd/bwd   Paddle Board push/pull, flex/ext x 17  Step Taps x 17  UE Paddles Open 2x10  -push/pull, flex/ext, habd/hadd, abd/add  100% unloading 1 large noodle  -bicycle 5'  -hip abd 5'  -hang 5'  Gradual Exit 2'  Aquatic Exercise  Aquatic Exercise Performed: Yes     Goals:Aquatic Exercise  Aquatic Exercise Performed: Yes           Goals:  Active       PT Problem       PT Goal 1       Start:  10/25/23    Expected End:  11/25/23       LTG's:    1) Improve Lumbar AROM from  Miller 70% ext and 50% b/l SB  to Miller <= 50% ext and 30% b/l SB in order to facilitate improved gait and mobility.       4-6 weeks      2) Improve Core and B/L hip/ LE strength from 4/5  to >= 4+/5 throughout in order to facilitate safe gait and  mobility.         4-6 weeks      3) Improve Modified Oswestry score by >= 5 points in order to improve QOL.     4-6 weeks      4) Improve LBP from  8/10 to <= 3 /10 with activity in order to improve QOL.      4-6 weeks      5) Pt will be able to walk longer distances, lift/carry objects and perform standing activity in the home without significant limitation       4-6 weeks      ST)  Pt/caregiver will be I and consistent with HEP in order to maximize strength and flexibility.    2-3 weeks

## 2023-12-20 ENCOUNTER — TREATMENT (OUTPATIENT)
Dept: PHYSICAL THERAPY | Facility: CLINIC | Age: 63
End: 2023-12-20
Payer: MEDICARE

## 2023-12-20 ENCOUNTER — APPOINTMENT (OUTPATIENT)
Dept: PHYSICAL THERAPY | Facility: CLINIC | Age: 63
End: 2023-12-20
Payer: MEDICARE

## 2023-12-20 DIAGNOSIS — M45.0 ANKYLOSING SPONDYLITIS OF MULTIPLE SITES IN SPINE (MULTI): ICD-10-CM

## 2023-12-20 PROCEDURE — 97110 THERAPEUTIC EXERCISES: CPT | Mod: GP | Performed by: PHYSICAL THERAPIST

## 2023-12-20 ASSESSMENT — PAIN SCALES - GENERAL: PAINLEVEL_OUTOF10: 7

## 2023-12-20 ASSESSMENT — PAIN - FUNCTIONAL ASSESSMENT: PAIN_FUNCTIONAL_ASSESSMENT: 0-10

## 2023-12-20 NOTE — PROGRESS NOTES
Physical Therapy Reassessment/Treatment    Patient Name: Horacio Gupta  MRN: [id number]  Today's Date: 12/20/2023  Time Calculation  Start Time: 1130  Stop Time: 1200  Time Calculation (min): 30 min      Assessment:   The pt is making overall progress with improved core/b/l LE strength and overall frequency of severe pain.  Pt reports that aquatic sessions lessen his pain severity for 1-2 days after sessions.  Pt is unable to tolerate land based PT due to severe pain but does try to do his flexion biased stretches for HEP at home.  Pt still has severe LBP, LE/core weakness and would benefit from continuing aquatic PT as per current PT POC.  Continue PT 2x/wk for 4 more weeks or 8 more sessions.      Plan:   Treatment/Interventions: Aquatic therapy, Cryotherapy, Hot pack, Education/ Instruction, Manual therapy, Therapeutic exercises  PT Plan: Skilled PT  PT Frequency: 2 times per week  Duration: 2x/wk for 4 weeks or 9 total sessions counting PT eval  Rehab Potential: Good  Plan of Care Agreement: Patient    12/20/2023 PT Reassessment:  The pt is making overall progress with improved core/b/l LE strength and overall frequency of severe pain.  Pt reports that aquatic sessions lessen his pain severity for 1-2 days after sessions.  Pt is unable to tolerate land based PT due to severe pain but does try to do his flexion biased stretches for HEP at home.  Pt still has severe LBP, LE/core weakness and would benefit from continuing aquatic PT as per current PT POC.  Continue PT 2x/wk for 4 more weeks or 8 more sessions.     Current Problem  Problem List Items Addressed This Visit             ICD-10-CM       Musculoskeletal and Injuries    Ankylosing spondylitis of multiple sites in spine (CMS/HCC) M45.0    Relevant Orders    Follow Up In Physical Therapy    Follow Up In Physical Therapy       Subjective  The pt reports that he still has significant LBP with activity but that the aquatic rx really helps and offers pain  relief with less pain for approx 2 days.  Pt reports that he would like to extend rx a little longer if possible.      Precautions  Precautions  Precautions Comment: Mild Fall Risk.   PMH:  HTN, hx of MI x 2, pacemaker/defibrilator, COPD, emphysema, OA, vascular disease, L THR, L ankle reconstruction. (No e-stim due to pacemaker)    Pain  Pain Assessment: 0-10  Pain Score: 7  Pain Type: Chronic pain  Pain Location: Back  Pain Orientation: Lower      Treatments:  Ther Ex:  25 min   2 units    Hooklying SKTC  5 sec hold x 5 reps ea  Hooklying Piriformis/Glut Stretch  20 sec hold x 2 ea  Hooklying Lateral Trunk Rotation  (small comfortable arc of motion)   2 min  Reviewed land based stretches for HEP with handout given  12/20/2023 PT Reassessment Completed Today.    Aquatics:   X today  Side Stepping 3 laps  Hip Abd x 17  Hip Flex x 17  Heel Raises x20   Alt marching x20   Squats x 17  Dumbbell Rolls x 40 fwd/bwd   Paddle Board push/pull, flex/ext x 17  Step Taps x 17  UE Paddles Open 2x10  -push/pull, flex/ext, habd/hadd, abd/add  100% unloading 1 large noodle  -bicycle 5'  -hip abd 5'  -hang 5'  Gradual Exit 2'    Goals:  LTG's:    1) Improve Lumbar AROM from  Miller 70% ext and 50% b/l SB  to Miller <= 50% ext and 30% b/l SB in order to facilitate improved gait and mobility.       4-6 weeks   11/22/2023, NOT MET, Lumbar AROM Miller 70% ext and 50% b/l SB   12/20/2023, NOT MET, Lumbar AROM Miller 70% ext and 50% b/l SB     2) Improve Core and B/L hip/ LE strength from 4/5  to >= 5-/5 throughout in order to facilitate safe gait and mobility.         4-6 weeks   11/22/2023, PARTIALLY MET, B/L LE strength 4 to 4+/5 throughout except L hip flex 4/5    12/20/2023, PARTIALLY MET, b/l LE strength 4 to 4+/5 throughout except L hip flex 4-/5    3) Improve Modified Oswestry score by >= 5 points in order to improve QOL.     4-6 weeks  11/22/2023, NOT MET, Pt scored a 40% at baseline and a 40% at reassessment    12/20/2023, PARTIALLY MET,  pt improved score from 40% at last reassessment to a 36 at current reassessment    4) Improve LBP from  8/10 to <= 3 /10 with activity in order to improve QOL.      4-6 weeks   2023, PARTIALLY MET, pain range  6-8/10    2023, PARTIALLY MET, pain range 6-8/10 with activity      5) Pt will be able to walk longer distances, lift/carry objects and perform standing activity in the home without significant limitation       4-6 weeks   2023, PARTIALLY MET, Pt reports mild improvement in the above activities but still does have pain and is cautious not to over do it.    2023, PARTIALLY MET, pt can do the above activity better overall but still has pain/limitation     ST)  Pt/caregiver will be I and consistent with HEP in order to maximize strength and flexibility.    2-3 weeks 2023, MET, pt is I with HEP and has handouts.   2023, MET, pt is I with HEP and has handouts.

## 2023-12-21 ENCOUNTER — CLINICAL SUPPORT (OUTPATIENT)
Dept: CARDIOLOGY | Facility: HOSPITAL | Age: 63
End: 2023-12-21
Payer: MEDICARE

## 2023-12-21 ENCOUNTER — HOSPITAL ENCOUNTER (OUTPATIENT)
Dept: CARDIOLOGY | Facility: HOSPITAL | Age: 63
Discharge: HOME | End: 2023-12-21
Payer: MEDICARE

## 2023-12-21 VITALS
DIASTOLIC BLOOD PRESSURE: 72 MMHG | HEART RATE: 64 BPM | OXYGEN SATURATION: 97 % | RESPIRATION RATE: 18 BRPM | WEIGHT: 148.5 LBS | BODY MASS INDEX: 26.31 KG/M2 | SYSTOLIC BLOOD PRESSURE: 115 MMHG

## 2023-12-21 DIAGNOSIS — I47.20 VT (VENTRICULAR TACHYCARDIA) (MULTI): Primary | ICD-10-CM

## 2023-12-21 DIAGNOSIS — Z95.810 PRESENCE OF AUTOMATIC CARDIOVERTER/DEFIBRILLATOR (AICD): ICD-10-CM

## 2023-12-21 DIAGNOSIS — I47.20 VT (VENTRICULAR TACHYCARDIA) (MULTI): ICD-10-CM

## 2023-12-21 DIAGNOSIS — I50.20 HEART FAILURE WITH REDUCED LEFT VENTRICULAR FUNCTION (MULTI): ICD-10-CM

## 2023-12-21 PROCEDURE — 93290 INTERROG DEV EVAL ICPMS IP: CPT | Performed by: STUDENT IN AN ORGANIZED HEALTH CARE EDUCATION/TRAINING PROGRAM

## 2023-12-21 PROCEDURE — 93283 PRGRMG EVAL IMPLANTABLE DFB: CPT | Performed by: STUDENT IN AN ORGANIZED HEALTH CARE EDUCATION/TRAINING PROGRAM

## 2023-12-21 PROCEDURE — 99212 OFFICE O/P EST SF 10 MIN: CPT | Mod: 25 | Performed by: STUDENT IN AN ORGANIZED HEALTH CARE EDUCATION/TRAINING PROGRAM

## 2023-12-21 PROCEDURE — 93283 PRGRMG EVAL IMPLANTABLE DFB: CPT

## 2023-12-21 PROCEDURE — 99212 OFFICE O/P EST SF 10 MIN: CPT | Performed by: STUDENT IN AN ORGANIZED HEALTH CARE EDUCATION/TRAINING PROGRAM

## 2023-12-21 ASSESSMENT — PAIN SCALES - GENERAL: PAINLEVEL_OUTOF10: 0 - NO PAIN

## 2023-12-21 ASSESSMENT — PAIN - FUNCTIONAL ASSESSMENT: PAIN_FUNCTIONAL_ASSESSMENT: 0-10

## 2023-12-21 NOTE — PATIENT INSTRUCTIONS
Continue medications at same dose    Patient verbalizes understanding for:  Low sodium diet-1500-2000mg daily of sodium  Fluid Restriction  Follow-up appointment with [facility]  Weighing self daily   Medications dose and schedule  Signs of increased heart failure  Activity Recommendations     Diet  2000 mg (2 gram) sodium diet-Do not add salt to foods or cook with salt. Check labels for Sodium (Na)     Resources  [facility] [phone] Monday - Friday 7:00 am - 3:00 pm  Websites: www.[employer]; American [employer] Association: www.heart.org    Special Instructions:  If you smoke-Quit!  If you are not able to contact your doctor and need immediate medical attention, call 911  If you are not able to keep you're scheduled appointment at the [facility], call [phone]  Watch for and report to your doctor all warning signs of Heart Failure (increased difficulty with breathing, 3-5 pound weight gain in one week or less, increased swelling, increased tiredness)  Weigh yourself each day at the same time with the same amount of clothes on. Record your weight log. Bring it with your to each visit

## 2023-12-21 NOTE — PROGRESS NOTES
"Horacio arrived ambulatory to the [facility] for his scheduled visit with his wife present. States he is feeling \" pretty good \". He denies any increased shortness of breath, PND, or Orthopnea. Uses CPAP nightly as ordered. His labs were recently done in October and placed on the chart. He is eating and sleeping without distress. Home medications reviewed without any changes, he stated \"I'm taking the Bumex every other day and it seems to be working well\". Plan is to continue the same current regimen, and had his device checked today in the device clinic without any issues and will have his next check in 6 months as well as a follow up with the [facility]. We reviewed the signs and symptoms of increased heart failure and when to call for help.    Vitals:  BP: 115/72            HR: 64            Resp: 18          SPO2: 97% on RA        Weight:  148.5/67.5                        Previous Weight:  159.6/72.4        Lung Sounds: Clear     Edema: None    JVD: None     Labs: October Labs Placed on Chart    Medications Administered: None    Next Appointment Date: June 25th, 2024 @ 12pm, Pacer Check @ 12:30pm     Note in EMR for treating Physician: Dr. Guaman    In-House Supervising Physician: Dr. Marte    "

## 2024-01-02 ENCOUNTER — TELEPHONE (OUTPATIENT)
Dept: CARDIOLOGY | Facility: CLINIC | Age: 64
End: 2024-01-02
Payer: MEDICARE

## 2024-01-02 DIAGNOSIS — E78.00 HYPERCHOLESTEROLEMIA: ICD-10-CM

## 2024-01-02 RX ORDER — LISINOPRIL 20 MG/1
20 TABLET ORAL DAILY
Qty: 90 TABLET | Refills: 3 | Status: SHIPPED | OUTPATIENT
Start: 2024-01-02 | End: 2024-02-27 | Stop reason: SDUPTHER

## 2024-01-03 ENCOUNTER — TREATMENT (OUTPATIENT)
Dept: PHYSICAL THERAPY | Facility: CLINIC | Age: 64
End: 2024-01-03
Payer: MEDICARE

## 2024-01-03 DIAGNOSIS — M45.0 ANKYLOSING SPONDYLITIS OF MULTIPLE SITES IN SPINE (MULTI): ICD-10-CM

## 2024-01-03 PROCEDURE — 97113 AQUATIC THERAPY/EXERCISES: CPT | Mod: GP,CQ | Performed by: PHYSICAL THERAPY ASSISTANT

## 2024-01-03 NOTE — PROGRESS NOTES
"Physical Therapy Treatment    Patient Name: Horacio Gupta  MRN: [id number]  Today's Date: 1/3/2024  Time Calculation  Start Time: 1130  Stop Time: 1215  Time Calculation (min): 45 min      Assessment: Patient tolerated treatment without increased back pain. Patient feels the water helps reduce back pain.  Patient has good form/understanding with there-ex,  Patient needing one UE support with LE there-ex and with 100% unloading.  Continue with core stability while performing dyn activity to decrease back pain.       Plan: Continue with core strength to improve mobility and decrease back pain.       Current Problem  Problem List Items Addressed This Visit             ICD-10-CM       Musculoskeletal and Injuries    Ankylosing spondylitis of multiple sites in spine (CMS/HCC) M45.0       Subjective  Patient reports 7/10 low back pain.                Treatments:  Side Stepping 3 laps  Hip Abd x 20  Hip Flex x 20  Heel Raises x20   Alt marching x20   Squats x 20  Dumbbell Rolls x 40\" ea. Dir. fwd/bwd  Yellow  Paddle Board push/pull, flex/ext x 20  Step Taps x 20  UE Paddles Closed 2x10  -push/pull, flex/ext, habd/hadd, abd/add  100% unloading 1 large noodle  -bicycle 5'  -hip abd 5'  -hang 5'  Gradual Exit 2'  Aquatic Exercise  Aquatic Exercise Performed: Yes     Goals:Aquatic Exercise  Aquatic Exercise Performed: Yes            Goals:  Active       PT Problem       PT Goal 1       Start:  10/25/23    Expected End:  11/25/23       LTG's:    1) Improve Lumbar AROM from  Miller 70% ext and 50% b/l SB  to Miller <= 50% ext and 30% b/l SB in order to facilitate improved gait and mobility.       4-6 weeks  11/22/2023, NOT MET, Lumbar AROM Miller 70% ext and 50% b/l SB     2) Improve Core and B/L hip/ LE strength from 4/5  to >= 5-/5 throughout in order to facilitate safe gait and mobility.         4-6 weeks  11/22/2023, PARTIALLY MET, B/L LE strength 4 to 4+/5 throughout except L hip flex 4/5     3) Improve Modified Oswestry score " by >= 5 points in order to improve QOL.     4-6 weeks  2023, NOT MET, Pt scored a 40% at baseline and a 40% at reassessment      4) Improve LBP from  8/10 to <= 3 /10 with activity in order to improve QOL.      4-6 weeks  2023, PARTIALLY MET, pain range  6-8/10     5) Pt will be able to walk longer distances, lift/carry objects and perform standing activity in the home without significant limitation       4-6 weeks  2023, PARTIALLY MET, Pt reports mild improvement in the above activities but still does have pain and is cautious not to over do it.       ST)  Pt/caregiver will be I and consistent with HEP in order to maximize strength and flexibility.    2-3 weeks  2023, MET, pt is I with HEP and has handouts.

## 2024-01-05 ENCOUNTER — TREATMENT (OUTPATIENT)
Dept: PHYSICAL THERAPY | Facility: CLINIC | Age: 64
End: 2024-01-05
Payer: MEDICARE

## 2024-01-05 DIAGNOSIS — M45.0 ANKYLOSING SPONDYLITIS OF MULTIPLE SITES IN SPINE (MULTI): Primary | ICD-10-CM

## 2024-01-05 PROCEDURE — 97113 AQUATIC THERAPY/EXERCISES: CPT | Mod: GP,CQ | Performed by: PHYSICAL THERAPY ASSISTANT

## 2024-01-05 ASSESSMENT — PAIN - FUNCTIONAL ASSESSMENT: PAIN_FUNCTIONAL_ASSESSMENT: 0-10

## 2024-01-05 ASSESSMENT — PAIN SCALES - GENERAL: PAINLEVEL_OUTOF10: 7

## 2024-01-05 NOTE — PROGRESS NOTES
"Physical Therapy Treatment    Patient Name: Horacio Gupta  MRN: [id number]  Today's Date: 1/5/2024  Time Calculation  Start Time: 1045  Stop Time: 1130  Time Calculation (min): 45 min      Assessment: Patient tolerated treatment without increased pain. Patient has good TrA and keeps intact with there-ex.  Patient has good form/understanding with there ex and keeps body in good alignment.  Patient needing one UE support with LE there ex and with 100% unloading.  Continue with core stability while performing dyn activity to decrease back pain.       Plan:Continue with core stability to improve standing, ambulation and sleeping.       Current Problem  Problem List Items Addressed This Visit             ICD-10-CM       Musculoskeletal and Injuries    Ankylosing spondylitis (CMS/HCC) - Primary M45.9       Subjective  Patient reports 7/10 low back pain. Reports difficulty sitting for long period of time.        Precautions  Precautions  Precautions Comment: Mild Fall Risk.   PMH:  HTN, hx of MI x 2, pacemaker/defibrilator, COPD, emphysema, OA, vascular disease, L THR, L ankle reconstruction. (No e-stim due to pacemaker)  Pain  Pain Assessment: 0-10  Pain Score: 7  Pain Type: Chronic pain  Pain Location: Back  Pain Orientation: Lower      Treatments:  Side Stepping 3 laps  Hip Abd x 20  Hip Flex x 20  Heel Raises x20   Alt marching x20   Squats x 20  Dumbbell Rolls x 40\" ea. Dir. fwd/bwd  Yellow  Paddle Board push/pull, flex/ext x 20  Step Taps x 20  UE Paddles Closed 2x10  -push/pull, flex/ext, habd/hadd, abd/add  100% unloading 1 large noodle  -bicycle 5'  -hip abd 5'  -hang 5'  Gradual Exit 2'  Aquatic Exercise  Aquatic Exercise Performed: Yes     Goals:Aquatic Exercise  Aquatic Exercise Performed: Yes            Goals:  Active       PT Problem       PT Goal 1       Start:  10/25/23    Expected End:  11/25/23       LTG's:    1) Improve Lumbar AROM from  Miller 70% ext and 50% b/l SB  to Miller <= 50% ext and 30% b/l SB " in order to facilitate improved gait and mobility.       4-6 weeks  2023, NOT MET, Lumbar AROM Miller 70% ext and 50% b/l SB     2) Improve Core and B/L hip/ LE strength from 4/5  to >= 5-/5 throughout in order to facilitate safe gait and mobility.         4-6 weeks  2023, PARTIALLY MET, B/L LE strength 4 to 4+/5 throughout except L hip flex 4/5     3) Improve Modified Oswestry score by >= 5 points in order to improve QOL.     4-6 weeks  2023, NOT MET, Pt scored a 40% at baseline and a 40% at reassessment      4) Improve LBP from  8/10 to <= 3 /10 with activity in order to improve QOL.      4-6 weeks  2023, PARTIALLY MET, pain range  6-8/10     5) Pt will be able to walk longer distances, lift/carry objects and perform standing activity in the home without significant limitation       4-6 weeks  2023, PARTIALLY MET, Pt reports mild improvement in the above activities but still does have pain and is cautious not to over do it.       ST)  Pt/caregiver will be I and consistent with HEP in order to maximize strength and flexibility.    2-3 weeks  2023, MET, pt is I with HEP and has handouts.              Aquatic Exercise  Aquatic Exercise Performed: Yes    OP EDUCATION:       Goals:  Active       PT Problem       PT Goal 1       Start:  10/25/23    Expected End:  23       LTG's:    1) Improve Lumbar AROM from  Miller 70% ext and 50% b/l SB  to Miller <= 50% ext and 30% b/l SB in order to facilitate improved gait and mobility.       4-6 weeks  2023, NOT MET, Lumbar AROM Miller 70% ext and 50% b/l SB     2) Improve Core and B/L hip/ LE strength from 4/5  to >= 5-/5 throughout in order to facilitate safe gait and mobility.         4-6 weeks  2023, PARTIALLY MET, B/L LE strength 4 to 4+/5 throughout except L hip flex 4/5     3) Improve Modified Oswestry score by >= 5 points in order to improve QOL.     4-6 weeks  2023, NOT MET, Pt scored a 40% at baseline and a 40% at  reassessment      4) Improve LBP from  8/10 to <= 3 /10 with activity in order to improve QOL.      4-6 weeks  2023, PARTIALLY MET, pain range  6-8/10     5) Pt will be able to walk longer distances, lift/carry objects and perform standing activity in the home without significant limitation       4-6 weeks  2023, PARTIALLY MET, Pt reports mild improvement in the above activities but still does have pain and is cautious not to over do it.       ST)  Pt/caregiver will be I and consistent with HEP in order to maximize strength and flexibility.    2-3 weeks  2023, MET, pt is I with HEP and has handouts.

## 2024-01-10 ENCOUNTER — TREATMENT (OUTPATIENT)
Dept: PHYSICAL THERAPY | Facility: CLINIC | Age: 64
End: 2024-01-10
Payer: MEDICARE

## 2024-01-10 DIAGNOSIS — M45.0 ANKYLOSING SPONDYLITIS OF MULTIPLE SITES IN SPINE (MULTI): ICD-10-CM

## 2024-01-10 DIAGNOSIS — M54.9 BACK PAIN: Primary | ICD-10-CM

## 2024-01-10 PROCEDURE — 97113 AQUATIC THERAPY/EXERCISES: CPT | Mod: GP,CQ | Performed by: PHYSICAL THERAPY ASSISTANT

## 2024-01-10 ASSESSMENT — PAIN - FUNCTIONAL ASSESSMENT: PAIN_FUNCTIONAL_ASSESSMENT: 0-10

## 2024-01-10 ASSESSMENT — PAIN SCALES - GENERAL: PAINLEVEL_OUTOF10: 7

## 2024-01-10 NOTE — PROGRESS NOTES
"Physical Therapy Treatment    Patient Name: Horacio Gupta  MRN: [id number]  Today's Date: 1/10/2024  Time Calculation  Start Time: 0915  Stop Time: 1000  Time Calculation (min): 45 min      Assessment: Patient tolerated treatment without increased pain. Patient has good TrA and keeps intact with there-ex.  Patient has good form/understanding with there-ex. Patient needing one UE support with LE there-ex and with 100% unloading,  Continue with core stability while performing dyn activity to decrease back pain.    Plan: Continue with core stability to improve ambulation, standing, sleeping.       Current Problem  Problem List Items Addressed This Visit             ICD-10-CM       Musculoskeletal and Injuries    Back pain - Primary M54.9    Ankylosing spondylitis of multiple sites in spine (CMS/HCC) M45.0       Subjective  Patient reports no falls and states low back pain of 7/10.  Patient to receive infusion this morning in [place], then sees his MD about hernia.       Precautions  Precautions  Precautions Comment: Mild Fall risk  Pain  Pain Assessment: 0-10  Pain Score: 7  Pain Type: Chronic pain  Pain Location: Back  Pain Orientation: Lower    Treatments:  Side Stepping 3 laps  Hip Abd x 20  Hip Flex x 20  Heel Raises x20   Alt marching x20   Squats x 20  Dumbbell Rolls x 40\" ea. Dir. fwd/bwd  Yellow  Paddle Board push/pull, flex/ext x 20  Step Taps x 20  UE Paddles Closed 2x10  -push/pull, flex/ext, habd/hadd, abd/add  100% unloading 1 large noodle  -bicycle 5'  -hip abd 5'  -hang 5'  Gradual Exit 2'    Aquatic Exercise  Aquatic Exercise Performed: Yes           Goals:  Active       PT Problem       PT Goal 1       Start:  10/25/23    Expected End:  11/25/23       LTG's:    1) Improve Lumbar AROM from  Miller 70% ext and 50% b/l SB  to Miller <= 50% ext and 30% b/l SB in order to facilitate improved gait and mobility.       4-6 weeks  11/22/2023, NOT MET, Lumbar AROM Miller 70% ext and 50% b/l SB     2) Improve Core " and B/L hip/ LE strength from 4/5  to >= 5-/5 throughout in order to facilitate safe gait and mobility.         4-6 weeks  2023, PARTIALLY MET, B/L LE strength 4 to 4+/5 throughout except L hip flex 4/5     3) Improve Modified Oswestry score by >= 5 points in order to improve QOL.     4-6 weeks  2023, NOT MET, Pt scored a 40% at baseline and a 40% at reassessment      4) Improve LBP from  8/10 to <= 3 /10 with activity in order to improve QOL.      4-6 weeks  2023, PARTIALLY MET, pain range  6-8/10     5) Pt will be able to walk longer distances, lift/carry objects and perform standing activity in the home without significant limitation       4-6 weeks  2023, PARTIALLY MET, Pt reports mild improvement in the above activities but still does have pain and is cautious not to over do it.       ST)  Pt/caregiver will be I and consistent with HEP in order to maximize strength and flexibility.    2-3 weeks  2023, MET, pt is I with HEP and has handouts.

## 2024-01-12 ENCOUNTER — TREATMENT (OUTPATIENT)
Dept: PHYSICAL THERAPY | Facility: CLINIC | Age: 64
End: 2024-01-12
Payer: MEDICARE

## 2024-01-12 DIAGNOSIS — M54.9 BACK PAIN: Primary | ICD-10-CM

## 2024-01-12 DIAGNOSIS — M45.0 ANKYLOSING SPONDYLITIS OF MULTIPLE SITES IN SPINE (MULTI): ICD-10-CM

## 2024-01-12 PROCEDURE — 97113 AQUATIC THERAPY/EXERCISES: CPT | Mod: GP,CQ | Performed by: PHYSICAL THERAPY ASSISTANT

## 2024-01-12 ASSESSMENT — PAIN - FUNCTIONAL ASSESSMENT: PAIN_FUNCTIONAL_ASSESSMENT: 0-10

## 2024-01-12 ASSESSMENT — PAIN SCALES - GENERAL: PAINLEVEL_OUTOF10: 7

## 2024-01-12 NOTE — PROGRESS NOTES
"Physical Therapy Treatment    Patient Name: Horacio Gupta  MRN: [id number]  Today's Date: 1/12/2024  Time Calculation  Start Time: 1130  Stop Time: 1215  Time Calculation (min): 45 min      Assessment: Patient tolerated treatment without increased pain.  Patient has good TrA and keeps intact with there-ex.  Patient has good form/understanding with there-ex and keeps body in good alignment.  Patient needing one UE support with LE there-ex and with 100% unloading.  Continue with core stability while performing dyn activity to decrease back pain.       Plan:Continue with core strength to improve sleeping, standing, ambulation.       Current Problem  Problem List Items Addressed This Visit             ICD-10-CM       Musculoskeletal and Injuries    Back pain - Primary M54.9    Ankylosing spondylitis of multiple sites in spine (CMS/HCC) M45.0       Subjective Patient reports no falls and states 7/10 low back pain.  General       Precautions  Precautions Comment: Mild Fall risk    Pain Assessment: 0-10  Pain Score: 7  Pain Type: Chronic pain  Pain Location: Back  Pain Orientation: Lower  Treatments:  Side Stepping 3 laps  Hip Abd x 20  Hip Flex x 20  Heel Raises x20   Alt marching x20   Squats x 20  Dumbbell Rolls x 40\" ea. Dir. fwd/bwd  Yellow  Paddle Board push/pull, flex/ext x 20  Step Taps x 20  UE Paddles Closed 2x10  -push/pull, flex/ext, habd/hadd, abd/add  100% unloading 1 large noodle  -bicycle 5'  -hip abd 5'  -hang 5'  Gradual Exit 2'    Aquatic Exercise  Aquatic Exercise Performed: Yes  Aquatic Exercise  Aquatic Exercise Performed: Yes    OP EDUCATION:       Goals:  Active       PT Problem       PT Goal 1       Start:  10/25/23    Expected End:  11/25/23       LTG's:    1) Improve Lumbar AROM from  Miller 70% ext and 50% b/l SB  to Miller <= 50% ext and 30% b/l SB in order to facilitate improved gait and mobility.       4-6 weeks  11/22/2023, NOT MET, Lumbar AROM Miller 70% ext and 50% b/l SB     2) Improve Core " and B/L hip/ LE strength from 4/5  to >= 5-/5 throughout in order to facilitate safe gait and mobility.         4-6 weeks  2023, PARTIALLY MET, B/L LE strength 4 to 4+/5 throughout except L hip flex 4/5     3) Improve Modified Oswestry score by >= 5 points in order to improve QOL.     4-6 weeks  2023, NOT MET, Pt scored a 40% at baseline and a 40% at reassessment      4) Improve LBP from  8/10 to <= 3 /10 with activity in order to improve QOL.      4-6 weeks  2023, PARTIALLY MET, pain range  6-8/10     5) Pt will be able to walk longer distances, lift/carry objects and perform standing activity in the home without significant limitation       4-6 weeks  2023, PARTIALLY MET, Pt reports mild improvement in the above activities but still does have pain and is cautious not to over do it.       ST)  Pt/caregiver will be I and consistent with HEP in order to maximize strength and flexibility.    2-3 weeks  2023, MET, pt is I with HEP and has handouts.

## 2024-01-17 ENCOUNTER — TREATMENT (OUTPATIENT)
Dept: PHYSICAL THERAPY | Facility: CLINIC | Age: 64
End: 2024-01-17
Payer: MEDICARE

## 2024-01-17 ENCOUNTER — APPOINTMENT (OUTPATIENT)
Dept: PRIMARY CARE | Facility: CLINIC | Age: 64
End: 2024-01-17
Payer: MEDICARE

## 2024-01-17 DIAGNOSIS — M45.0 ANKYLOSING SPONDYLITIS OF MULTIPLE SITES IN SPINE (MULTI): ICD-10-CM

## 2024-01-17 PROCEDURE — 97113 AQUATIC THERAPY/EXERCISES: CPT | Mod: GP,CQ | Performed by: PHYSICAL THERAPY ASSISTANT

## 2024-01-17 ASSESSMENT — PAIN SCALES - GENERAL: PAINLEVEL_OUTOF10: 7

## 2024-01-17 ASSESSMENT — PAIN - FUNCTIONAL ASSESSMENT: PAIN_FUNCTIONAL_ASSESSMENT: 0-10

## 2024-01-17 NOTE — PROGRESS NOTES
"Physical Therapy Treatment    Patient Name: Horacio Gupta  MRN: [id number]  Today's Date: 1/17/2024  Time Calculation  Start Time: 1045  Stop Time: 1130  Time Calculation (min): 45 min      Assessment: Patient toleraated treatment without increased pain.  Patient has good TrA and keeps intact with there-ex.  Patient needing one UE support with LE there-ex and with 100% unloading.  Patient has good form/understanding with there-ex and keeps body in good alignment.  Continue with core stability while performing dyn activity to decrease back pain.       Plan:Continue with core strength to improve sleep, standing, ambulation.       Current Problem  Problem List Items Addressed This Visit             ICD-10-CM       Musculoskeletal and Injuries    Ankylosing spondylitis of multiple sites in spine (CMS/HCC) M45.0       Subjective  Patient reports no falls and states 7/10 low back pain.        Precautions  Precautions  Precautions Comment: Mild Fall risk  Pain  Pain Assessment: 0-10  Pain Score: 7  Pain Type: Chronic pain  Pain Location: Back  Pain Orientation: Lower    Treatments:  Treatments:  Side Stepping 3 laps  Hip Abd x 20  Hip Flex x 20  Heel Raises x20   Alt marching x20   Squats x 20  Dumbbell Rolls x 40\" ea. Dir. fwd/bwd  Yellow  Paddle Board push/pull, flex/ext x 20  Step Taps x 20  UE Paddles Closed 2x10  -push/pull, flex/ext, habd/hadd, abd/add  100% unloading 1 large noodle  -bicycle 5'  -hip abd 5'  -hang 5'  Gradual Exit 2'    Aquatic Exercise  Aquatic Exercise Performed: Yes           Goals:Aquatic Exercise  Aquatic Exercise Performed: Yes         Goals:  Active       PT Problem       PT Goal 1       Start:  10/25/23    Expected End:  11/25/23       LTG's:    1) Improve Lumbar AROM from  Miller 70% ext and 50% b/l SB  to Miller <= 50% ext and 30% b/l SB in order to facilitate improved gait and mobility.       4-6 weeks  11/22/2023, NOT MET, Lumbar AROM Miller 70% ext and 50% b/l SB     2) Improve Core and B/L " hip/ LE strength from 4/5  to >= 5-/5 throughout in order to facilitate safe gait and mobility.         4-6 weeks  2023, PARTIALLY MET, B/L LE strength 4 to 4+/5 throughout except L hip flex 4/5     3) Improve Modified Oswestry score by >= 5 points in order to improve QOL.     4-6 weeks  2023, NOT MET, Pt scored a 40% at baseline and a 40% at reassessment      4) Improve LBP from  8/10 to <= 3 /10 with activity in order to improve QOL.      4-6 weeks  2023, PARTIALLY MET, pain range  6-8/10     5) Pt will be able to walk longer distances, lift/carry objects and perform standing activity in the home without significant limitation       4-6 weeks  2023, PARTIALLY MET, Pt reports mild improvement in the above activities but still does have pain and is cautious not to over do it.       ST)  Pt/caregiver will be I and consistent with HEP in order to maximize strength and flexibility.    2-3 weeks  2023, MET, pt is I with HEP and has handouts.

## 2024-01-19 ENCOUNTER — TREATMENT (OUTPATIENT)
Dept: PHYSICAL THERAPY | Facility: CLINIC | Age: 64
End: 2024-01-19
Payer: MEDICARE

## 2024-01-19 DIAGNOSIS — M45.0 ANKYLOSING SPONDYLITIS OF MULTIPLE SITES IN SPINE (MULTI): ICD-10-CM

## 2024-01-19 PROCEDURE — 97113 AQUATIC THERAPY/EXERCISES: CPT | Mod: GP,CQ | Performed by: PHYSICAL THERAPY ASSISTANT

## 2024-01-19 ASSESSMENT — PAIN SCALES - GENERAL: PAINLEVEL_OUTOF10: 7

## 2024-01-19 ASSESSMENT — PAIN - FUNCTIONAL ASSESSMENT: PAIN_FUNCTIONAL_ASSESSMENT: 0-10

## 2024-01-19 NOTE — PROGRESS NOTES
"Physical Therapy Treatment    Patient Name: Horacio Gupta  MRN: [id number]  Today's Date: 1/19/2024  Time Calculation  Start Time: 1045  Stop Time: 1130  Time Calculation (min): 45 min      Assessment:Patient tolerated treatment without increased pain.  Patient has good TrA and keeps intact with there-ex.   Patient has good form/understanding with there-ex and keeps body in good alignment.  Continue with core stability while performing dyn activity to decrease back pain.       Plan:Continue with core stability to improve sleep, standing, ambulation.       Current Problem  Problem List Items Addressed This Visit             ICD-10-CM       Musculoskeletal and Injuries    Ankylosing spondylitis of multiple sites in spine (CMS/[facility]) M45.0       Subjective  Patient reports no falls and states 7/10 low back pain.       Precautions  Precautions  Precautions Comment: Mild Fall risk  Pain  Pain Assessment: 0-10  Pain Score: 7  Pain Type: Chronic pain  Pain Location: Back  Pain Orientation: Lower    Treatments:  Side Stepping 3 laps  Hip Abd x 20  Hip Flex x 20  Heel Raises x20   Alt marching x20   Squats x 20  Dumbbell Rolls x 40\" ea. Dir. fwd/bwd  Yellow  Paddle Board push/pull, flex/ext x 20  Step Taps x 20  UE Paddles Closed 2x10  -push/pull, flex/ext, habd/hadd, abd/add  100% unloading 1 large noodle  -bicycle 5'  -hip abd 5'  -hang 5'  Gradual Exit 2'    Aquatic Exercise  Aquatic Exercise Performed: YesAquatic Exercise  Aquatic Exercise Performed: Yes    OP EDUCATION:       Goals:  Active       PT Problem       PT Goal 1       Start:  10/25/23    Expected End:  11/25/23       LTG's:    1) Improve Lumbar AROM from  Miller 70% ext and 50% b/l SB  to Miller <= 50% ext and 30% b/l SB in order to facilitate improved gait and mobility.       4-6 weeks  11/22/2023, NOT MET, Lumbar AROM Miller 70% ext and 50% b/l SB     2) Improve Core and B/L hip/ LE strength from 4/5  to >= 5-/5 throughout in order to facilitate safe gait and " mobility.         4-6 weeks  2023, PARTIALLY MET, B/L LE strength 4 to 4+/5 throughout except L hip flex 4/5     3) Improve Modified Oswestry score by >= 5 points in order to improve QOL.     4-6 weeks  2023, NOT MET, Pt scored a 40% at baseline and a 40% at reassessment      4) Improve LBP from  8/10 to <= 3 /10 with activity in order to improve QOL.      4-6 weeks  2023, PARTIALLY MET, pain range  6-8/10     5) Pt will be able to walk longer distances, lift/carry objects and perform standing activity in the home without significant limitation       4-6 weeks  2023, PARTIALLY MET, Pt reports mild improvement in the above activities but still does have pain and is cautious not to over do it.       ST)  Pt/caregiver will be I and consistent with HEP in order to maximize strength and flexibility.    2-3 weeks  2023, MET, pt is I with HEP and has handouts.

## 2024-01-24 ENCOUNTER — TREATMENT (OUTPATIENT)
Dept: PHYSICAL THERAPY | Facility: CLINIC | Age: 64
End: 2024-01-24
Payer: MEDICARE

## 2024-01-24 DIAGNOSIS — M45.0 ANKYLOSING SPONDYLITIS OF MULTIPLE SITES IN SPINE (MULTI): ICD-10-CM

## 2024-01-24 PROCEDURE — 97113 AQUATIC THERAPY/EXERCISES: CPT | Mod: GP,CQ | Performed by: PHYSICAL THERAPY ASSISTANT

## 2024-01-24 ASSESSMENT — PAIN - FUNCTIONAL ASSESSMENT: PAIN_FUNCTIONAL_ASSESSMENT: 0-10

## 2024-01-24 ASSESSMENT — PAIN SCALES - GENERAL: PAINLEVEL_OUTOF10: 7

## 2024-01-24 NOTE — PROGRESS NOTES
"Physical Therapy Treatment    Patient Name: Horacio Gupta  MRN: [id number]  Today's Date: 1/24/2024  Time Calculation  Start Time: 1045  Stop Time: 1130  Time Calculation (min): 45 min      Assessment: Patient tolerated treatment without increased pain.  Patient has good TrA and keeps intact with there-ex.  Patient needing one UE support with LE there-ex and with 100% unloading.  Patient has good form/understanding with there-ex and keeps body in good alignment.  Continue with core stability while performing dyn activity to decrease back pain.       Plan: Continue with core stability to improve sleeping, standing, ambulation.       Current Problem  Problem List Items Addressed This Visit             ICD-10-CM       Musculoskeletal and Injuries    Ankylosing spondylitis of multiple sites in spine (CMS/HCC) M45.0       Subjective  Patient reports no falls and states 7/10 low back pain. Patient reports has pain MD apt on Feb. 2 for possible injection.       Precautions  Precautions  Precautions Comment: Mild Fall risk  Pain  Pain Assessment: 0-10  Pain Score: 7  Pain Type: Chronic pain  Pain Location: Back  Pain Orientation: Lower  Treatments:  Side Stepping 3 laps  Hip Abd x 20  Hip Flex x 20  Heel Raises x20   Alt marching x20   Squats x 20  Dumbbell Rolls x 40\" ea. Dir. fwd/bwd  Yellow  Paddle Board push/pull, flex/ext x 20  Step Taps x 20  UE Paddles Closed 2x10  -push/pull, flex/ext, habd/hadd, abd/add  100% unloading 1 large noodle  -bicycle 5'  -hip abd 5'  -hang 5'  Gradual Exit 2'    Aquatic Exercise  Aquatic Exercise Performed: YesAquatic Exercise  Aquatic Exercise Performed: Yes    OP EDUCATION:       Goals:  Active       PT Problem       PT Goal 1       Start:  10/25/23    Expected End:  11/25/23       LTG's:    1) Improve Lumbar AROM from  Miller 70% ext and 50% b/l SB  to Miller <= 50% ext and 30% b/l SB in order to facilitate improved gait and mobility.       4-6 weeks  11/22/2023, NOT MET, Lumbar AROM " Miller 70% ext and 50% b/l SB     2) Improve Core and B/L hip/ LE strength from 4/5  to >= 5-/5 throughout in order to facilitate safe gait and mobility.         4-6 weeks  2023, PARTIALLY MET, B/L LE strength 4 to 4+/5 throughout except L hip flex 4/5     3) Improve Modified Oswestry score by >= 5 points in order to improve QOL.     4-6 weeks  2023, NOT MET, Pt scored a 40% at baseline and a 40% at reassessment      4) Improve LBP from  8/10 to <= 3 /10 with activity in order to improve QOL.      4-6 weeks  2023, PARTIALLY MET, pain range  6-8/10     5) Pt will be able to walk longer distances, lift/carry objects and perform standing activity in the home without significant limitation       4-6 weeks  2023, PARTIALLY MET, Pt reports mild improvement in the above activities but still does have pain and is cautious not to over do it.       ST)  Pt/caregiver will be I and consistent with HEP in order to maximize strength and flexibility.    2-3 weeks  2023, MET, pt is I with HEP and has handouts.              Aquatic Exercise  Aquatic Exercise Performed: Yes           Goals:  Active       PT Problem       PT Goal 1       Start:  10/25/23    Expected End:  23       LTG's:    1) Improve Lumbar AROM from  Miller 70% ext and 50% b/l SB  to Miller <= 50% ext and 30% b/l SB in order to facilitate improved gait and mobility.       4-6 weeks  2023, NOT MET, Lumbar AROM Miller 70% ext and 50% b/l SB     2) Improve Core and B/L hip/ LE strength from 4/5  to >= 5-/5 throughout in order to facilitate safe gait and mobility.         4-6 weeks  2023, PARTIALLY MET, B/L LE strength 4 to 4+/5 throughout except L hip flex 4/5     3) Improve Modified Oswestry score by >= 5 points in order to improve QOL.     4-6 weeks  2023, NOT MET, Pt scored a 40% at baseline and a 40% at reassessment      4) Improve LBP from  8/10 to <= 3 /10 with activity in order to improve QOL.      4-6  weeks  2023, PARTIALLY MET, pain range  6-8/10     5) Pt will be able to walk longer distances, lift/carry objects and perform standing activity in the home without significant limitation       4-6 weeks  2023, PARTIALLY MET, Pt reports mild improvement in the above activities but still does have pain and is cautious not to over do it.       ST)  Pt/caregiver will be I and consistent with HEP in order to maximize strength and flexibility.    2-3 weeks  2023, MET, pt is I with HEP and has handouts.

## 2024-01-25 ENCOUNTER — TREATMENT (OUTPATIENT)
Dept: PHYSICAL THERAPY | Facility: CLINIC | Age: 64
End: 2024-01-25
Payer: MEDICARE

## 2024-01-25 DIAGNOSIS — M45.0 ANKYLOSING SPONDYLITIS OF MULTIPLE SITES IN SPINE (MULTI): ICD-10-CM

## 2024-01-25 PROCEDURE — 97110 THERAPEUTIC EXERCISES: CPT | Mod: GP | Performed by: PHYSICAL THERAPIST

## 2024-01-25 ASSESSMENT — PAIN SCALES - GENERAL: PAINLEVEL_OUTOF10: 7

## 2024-01-25 ASSESSMENT — PAIN - FUNCTIONAL ASSESSMENT: PAIN_FUNCTIONAL_ASSESSMENT: 0-10

## 2024-01-25 NOTE — PROGRESS NOTES
Physical Therapy Discharge/Treatment    Patient Name: Horacio Gupta  MRN: [id number]  Today's Date: 1/25/2024  Time Calculation  Start Time: 1130  Stop Time: 1200  Time Calculation (min): 30 min    Assessment:    The pt has made some mild objective progress in PT with improved lumbar/core/LE strength, ROM/flexibility.  The pt has PARTIALLY MET some of his  PT goals and is I with current HEP. Pt still has significant LBP that limits activity at times and will try to continue aquatic ex's I at [facility] after DC today.   DC PT at this time.  Follow up with MD for further diagnostic testing or other treatment options as needed.        Plan:   Treatment/Interventions: Aquatic therapy, Cryotherapy, Hot pack, Education/ Instruction, Manual therapy, Therapeutic exercises  PT Plan: Skilled PT  PT Frequency: 2 times per week  Duration: 2x/wk for 4 weeks or 9 total sessions counting PT eval  Rehab Potential: Good  Plan of Care Agreement: Patient    1/25/2024 PT DC Summary:  The pt has made some mild objective progress in PT with improved lumbar/core/LE strength, ROM/flexibility.  The pt has PARTIALLY MET some of his  PT goals and is I with current HEP. Pt still has significant LBP that limits activity at times and will try to continue aquatic ex's I at [facility] after DC today.   DC PT at this time.  Follow up with MD for further diagnostic testing or other treatment options as needed.        Current Problem  Problem List Items Addressed This Visit             ICD-10-CM       Musculoskeletal and Injuries    Ankylosing spondylitis of multiple sites in spine (CMS/HCC) M45.0       Subjective  Pt reports that the aquatic PT rx definitely help lessen the pain for a couple days after rx but does not lower it past 6/10.  Pt reports that he will try to get a pass to do his aquatic HEP at [facility] moving forward after DC today.      Precautions  Precautions  Precautions Comment: Mild Fall risk    Pain  Pain  "Assessment: 0-10  Pain Score: 7  Pain Type: Chronic pain  Pain Location: Back  Pain Orientation: Lower      Treatments:  Ther Ex:  25 min   2 units    Hooklying SKTC  5 sec hold x 5 reps ea  Hooklying Piriformis/Glut Stretch  20 sec hold x 2 ea  Hooklying Lateral Trunk Rotation  (small comfortable arc of motion)   2 min  Reviewed land based stretches for HEP with handout given  1/25/2024 PT Reassessment Completed Today.    Aquatics:   X today  Side Stepping 3 laps  Hip Abd x 20  Hip Flex x 20  Heel Raises x20   Alt marching x20   Squats x 20  Dumbbell Rolls x 40\" ea. Dir. fwd/bwd  Yellow  Paddle Board push/pull, flex/ext x 20  Step Taps x 20  UE Paddles Closed 2x10  -push/pull, flex/ext, habd/hadd, abd/add  100% unloading 1 large noodle  -bicycle 5'  -hip abd 5'  -hang 5'  Gradual Exit 2'    OP EDUCATION:   Edu on proper form and speed of movement with ex's.  Pt verbalized/demonstrated good understanding.    Goals:  LTG's:    1) Improve Lumbar AROM from  Miller 70% ext and 50% b/l SB  to Miller <= 50% ext and 30% b/l SB in order to facilitate improved gait and mobility.       4-6 weeks   11/22/2023, NOT MET, Lumbar AROM Miller 70% ext and 50% b/l SB   12/20/2023, NOT MET, Lumbar AROM Miller 70% ext and 50% b/l SB    1/25/2024, NOT MET, Lumbar AROM Miller 70% ext and 50% b/l SB    2) Improve Core and B/L hip/ LE strength from 4/5  to >= 5-/5 throughout in order to facilitate safe gait and mobility.         4-6 weeks   11/22/2023, PARTIALLY MET, B/L LE strength 4 to 4+/5 throughout except L hip flex 4/5 12/20/2023, PARTIALLY MET, b/l LE strength 4 to 4+/5 throughout except L hip flex 4-/5   1/25/2024, PARTIALLY MET, B/L LE strength 4+/5 throughout except L hip flex and L knee ext 4- to 4/5    3) Improve Modified Oswestry score by >= 5 points in order to improve QOL.     4-6 weeks 11/22/2023, NOT MET, Pt scored a 40% at baseline and a 40% at reassessment    12/20/2023, PARTIALLY MET, pt improved score from 40% at last reassessment to " a 36 at current reassessment    2024, NOT MET, pt scored a 36 at last reassessment and a 44% at DC.        4) Improve LBP from  8/10 to <= 3 /10 with activity in order to improve QOL.      4-6 weeks 2023, PARTIALLY MET, pain range  6-8/10    2023, PARTIALLY MET, pain range 6-8/10 with activity     2024, PARTIALLY MET, pain range  6-8/10 with activity      5) Pt will be able to walk longer distances, lift/carry objects and perform standing activity in the home without significant limitation       4-6 weeks   2023, PARTIALLY MET, Pt reports mild improvement in the above activities but still does have pain and is cautious not to over do it.    2023, PARTIALLY MET, pt can do the above activity better overall but still has pain/limitation     2024, PARTIALLY MET, pt can do the above activity better overall but still has pain/limitation         ST)  Pt/caregiver will be I and consistent with HEP in order to maximize strength and flexibility.    2-3 weeks 2023, MET, pt is I with HEP and has handouts.   2023, MET, pt is I with HEP and has handouts.   2024, MET, pt is I with HEP and has handouts.

## 2024-01-29 NOTE — H&P (VIEW-ONLY)
SUBJECTIVE:  This is 63 y.o.  male with PMH of smoker with depression, hypertension COPD, L5 compression fracture, ankylosing spondylitis, Ulcerative colitis on Remicade infusion, CAD with stents, congestive heart failure with ICD implant on Plavix, EMG on 6/13/2023 by Sinai Koenig showed severe L5 radiculopathy with active denervation who received caudal epidural steroid injection with 100% relief in 2023 who is here for follow-up.    Patient reports right leg from lateral knee to his toes. He also noticed right hand tremor since summer 2023 and now has progressed to his legs. He also has tail bone and left hip pain. He would like a repeat caudal injection similar to what he had prior to his injection (had recurred about a week ago).      Prior office visit:  8/20/2023: This is 62 year year old male recall past medical history of smoker with depression, hypertension COPD, L5 compression fracture, ankylosing spondylitis, Ulcerative colitis on Remicade infusion, CAD with stents, congestive heart failure with ICD implant on Plavix, the patient received caudal epidural steroid injection and he is very involved with aquatic therapy the patient received caudal epidural steroid injection and very involved with aquatic therapy. His EMG from 6/13/2023 by Sinai Koenig showed severe L5 radiculopathy with active denervation who is here for follow-up stating that the caudal NYASIA has helped him by 100% and aquatic therapy is making him stronger and he is very happy with the result. He has no weakness no incontinence no saddle paresthesias.          Last procedure:   4/18/2023 caudal NYASIA the patient has had a 100% improvement in pain and function     Portions of record reviewed for pertinent issues: active problem list, medication list, allergies, family history, social history, notes from last encounter, encounters, lab results, imaging and other system records.        I have personally reviewed the [facility] report for this  patient. This report is scanned into the electronic medical record. I have considered the risks of abuse, dependence, addiction and diversion. It showed few gabapentin 300 mg x 2 3 times daily from us   OPIOID RISK ASSESSMENT SCORE 0/26  Aberrant behavior: None        Employment/pending law suits: Retired on disability  Social History:  lives with his wife at home has 4 children, Denies smoking drinking or use of illicit drugs                 Diagnostic studies:  2/6/2023 lumbar x-ray showed severe degenerative changes with ankylosing spondylitis most likely with facet joint disease and osteopenia     2/3/2023 CT thoracic spine  IMPRESSION:  1. No acute fracture of the thoracic spine. Remote right L1  transverse process mildly displaced fracture.  2. Moderate multilevel degenerative changes most prominent at T10-T11  with superimposed findings of DISH.  3. Emphysema within the imaged lung fields.  4. 4.4 cm ascending thoracic aorta aneurysm.     :  2/3/2023 CT lumbar spine and thoracic spine showed:  FINDINGS:  OSSEOUS STRUCTURES:  There is a mildly distracted transverse fracture identified through  the mid to superior aspect of the L5 vertebral body. A minimally  displaced transverse fracture is seen through the right transverse  process at L4 no additional fractures are seen. The vertebral bodies  are well aligned without evidence of subluxation.     Mild disc space narrowing and bridging osteophyte formation is seen  throughout the visualized thoracolumbar spine. Moderate to severe  facet degenerative changes are seen throughout the lumbar spine.     LOWER THORACIC SPINE:  No gross central canal or neural foraminal narrowing is seen.     T12-L1:  No gross central canal or neural foraminal narrowing is seen.     L1-2:  No gross central canal or neural foraminal narrowing is seen.     L2-3:  No gross central canal or neural foraminal narrowing is seen.     L3-4:  No gross central canal or neural foraminal  narrowing is seen.     L4-5:  At least mild central canal narrowing is seen. No significant neural  foraminal narrowing is seen.     L5-S1:  Mild neural foraminal narrowing is seen bilaterally. No significant  central canal narrowing is seen.     ASSOCIATED STRUCTURES:  Evaluation of the visualized soft tissues of the abdomen is limited  by the lack of intravenous contrast. Within this limitation,  heterogeneous increased density is seen throughout the mid to  inferior aspect of the retroperitoneum, most consistent with  hemorrhage.     IMPRESSION:  1. L5 vertebral body fracture, as above.  2. Right L4 transverse process fracture.  3. Degenerative changes, as described above.         Portions of record reviewed for pertinent issues: active problem list, medication list, allergies, family history, social history, notes from last encounter, encounters, lab results, imaging and other available records.        Review of Systems   HENT: Negative.     Eyes: Negative.    Respiratory: Negative.     Cardiovascular: Negative.    Gastrointestinal: Negative.    Endocrine: Negative.    Genitourinary: Negative.    Musculoskeletal:  Positive for back pain, gait problem, myalgias and neck pain.   Skin: Negative.    Hematological: Negative.    Psychiatric/Behavioral: Negative.          Physical Exam  Vitals and nursing note reviewed.   Constitutional:       Appearance: Normal appearance.   HENT:      Head: Normocephalic and atraumatic.      Nose: Nose normal.   Eyes:      Extraocular Movements: Extraocular movements intact.      Conjunctiva/sclera: Conjunctivae normal.      Pupils: Pupils are equal, round, and reactive to light.   Cardiovascular:      Rate and Rhythm: Normal rate and regular rhythm.      Pulses: Normal pulses.      Heart sounds: Normal heart sounds.   Pulmonary:      Effort: Pulmonary effort is normal.      Breath sounds: Normal breath sounds.   Abdominal:      General: Abdomen is flat. Bowel sounds are normal.       Palpations: Abdomen is soft.   Musculoskeletal:         General: Tenderness present.   Skin:     General: Skin is warm.   Neurological:      Mental Status: He is alert and oriented to person, place, and time.   Psychiatric:         Mood and Affect: Mood normal.         Behavior: Behavior normal.           Noted to have tremors in his legs and hands. Shuffling gait.   Wearing soft C collar and back brace walking with walker.   5/5 strength in b/l LE with intact sensation except for right L5 distribution (decreased)  Hyper-reflexive throughout 3+  Normal skin on both his feet with intact pedal pulses     Plan  At least 50% of the visit was involved in the discussion of the options for treatment. We discussed exercises, medication, interventional therapies and surgery. Healthy life style is essential with patient hard work to achieve the wellness. In addition; discussion with the patient and/or family about any of the diagnostic results, impressions and/or recommended diagnostic studies, prognosis, risks and benefits of treatment options, instructions for treatment and/or follow-up, importance of compliance with chosen treatment options, risk-factor reduction, and patient/family education.         Pool therapy, walking in the pool, at least 3x per week for 30 minutes  Continue self-directed physical therapy  Patient reports working on Smoking cessation  Neurology consult for possible parkinsonism or upper motor neuron issues  Repeat caudal epidural steroid injection under fluoroscopy guidance  Healthy lifestyle and anti-inflammatory diet in addition to weight control discussed with the patient  Alternative chronic pain therapies was discussed, encouraged and information was handed  Plan for repeat caudal injection under fluoroscopy  Return to Clinic after neurology consult     *Please note this report has been produced using speech recognition software and may contain errors related to that system including grammar,  punctuation and spelling as well as words and phrases that may be inappropriate. If there are questions or concerns, please feel free to contact me to clarify.    Diaz Ansari MD

## 2024-01-30 ENCOUNTER — OFFICE VISIT (OUTPATIENT)
Dept: PAIN MEDICINE | Facility: CLINIC | Age: 64
End: 2024-01-30
Payer: MEDICARE

## 2024-01-30 VITALS
WEIGHT: 137 LBS | SYSTOLIC BLOOD PRESSURE: 112 MMHG | HEIGHT: 63 IN | DIASTOLIC BLOOD PRESSURE: 72 MMHG | HEART RATE: 74 BPM | BODY MASS INDEX: 24.27 KG/M2 | RESPIRATION RATE: 16 BRPM | TEMPERATURE: 97.9 F

## 2024-01-30 DIAGNOSIS — G25.9 MOVEMENT DISORDER: Primary | ICD-10-CM

## 2024-01-30 DIAGNOSIS — S32.050S COMPRESSION FRACTURE OF L5 VERTEBRA, SEQUELA: ICD-10-CM

## 2024-01-30 DIAGNOSIS — M54.16 LUMBAR RADICULOPATHY: ICD-10-CM

## 2024-01-30 DIAGNOSIS — M45.0 ANKYLOSING SPONDYLITIS OF MULTIPLE SITES IN SPINE (MULTI): ICD-10-CM

## 2024-01-30 PROCEDURE — 3078F DIAST BP <80 MM HG: CPT | Performed by: ANESTHESIOLOGY

## 2024-01-30 PROCEDURE — 99214 OFFICE O/P EST MOD 30 MIN: CPT | Performed by: ANESTHESIOLOGY

## 2024-01-30 PROCEDURE — 3074F SYST BP LT 130 MM HG: CPT | Performed by: ANESTHESIOLOGY

## 2024-01-30 RX ORDER — LORAZEPAM 2 MG/1
TABLET ORAL
Qty: 1 TABLET | Refills: 0 | Status: SHIPPED | OUTPATIENT
Start: 2024-01-30 | End: 2024-02-27 | Stop reason: ALTCHOICE

## 2024-01-30 SDOH — ECONOMIC STABILITY: FOOD INSECURITY: WITHIN THE PAST 12 MONTHS, THE FOOD YOU BOUGHT JUST DIDN'T LAST AND YOU DIDN'T HAVE MONEY TO GET MORE.: NEVER TRUE

## 2024-01-30 SDOH — ECONOMIC STABILITY: FOOD INSECURITY: WITHIN THE PAST 12 MONTHS, YOU WORRIED THAT YOUR FOOD WOULD RUN OUT BEFORE YOU GOT MONEY TO BUY MORE.: NEVER TRUE

## 2024-01-30 ASSESSMENT — LIFESTYLE VARIABLES
AUDIT TOTAL SCORE: 0
HAS A RELATIVE, FRIEND, DOCTOR, OR ANOTHER HEALTH PROFESSIONAL EXPRESSED CONCERN ABOUT YOUR DRINKING OR SUGGESTED YOU CUT DOWN: NO
HOW OFTEN DURING THE LAST YEAR HAVE YOU BEEN UNABLE TO REMEMBER WHAT HAPPENED THE NIGHT BEFORE BECAUSE YOU HAD BEEN DRINKING: NEVER
HAVE YOU OR SOMEONE ELSE BEEN INJURED AS A RESULT OF YOUR DRINKING: NO
HOW OFTEN DURING THE LAST YEAR HAVE YOU FOUND THAT YOU WERE NOT ABLE TO STOP DRINKING ONCE YOU HAD STARTED: NEVER
HOW OFTEN DURING THE LAST YEAR HAVE YOU NEEDED AN ALCOHOLIC DRINK FIRST THING IN THE MORNING TO GET YOURSELF GOING AFTER A NIGHT OF HEAVY DRINKING: NEVER
AUDIT-C TOTAL SCORE: 0
HOW OFTEN DO YOU HAVE A DRINK CONTAINING ALCOHOL: NEVER
HOW OFTEN DURING THE LAST YEAR HAVE YOU FAILED TO DO WHAT WAS NORMALLY EXPECTED FROM YOU BECAUSE OF DRINKING: NEVER
HOW OFTEN DURING THE LAST YEAR HAVE YOU HAD A FEELING OF GUILT OR REMORSE AFTER DRINKING: NEVER
HOW OFTEN DO YOU HAVE SIX OR MORE DRINKS ON ONE OCCASION: NEVER
TOTAL SCORE: 1
SKIP TO QUESTIONS 9-10: 1
HOW MANY STANDARD DRINKS CONTAINING ALCOHOL DO YOU HAVE ON A TYPICAL DAY: PATIENT DOES NOT DRINK

## 2024-01-30 ASSESSMENT — ENCOUNTER SYMPTOMS
LOSS OF SENSATION IN FEET: 1
DEPRESSION: 0
RESPIRATORY NEGATIVE: 1
HEMATOLOGIC/LYMPHATIC NEGATIVE: 1
OCCASIONAL FEELINGS OF UNSTEADINESS: 1
EYES NEGATIVE: 1
GASTROINTESTINAL NEGATIVE: 1
ENDOCRINE NEGATIVE: 1
BACK PAIN: 1
MYALGIAS: 1
CARDIOVASCULAR NEGATIVE: 1
PSYCHIATRIC NEGATIVE: 1
NECK PAIN: 1

## 2024-01-30 ASSESSMENT — PAIN DESCRIPTION - DESCRIPTORS: DESCRIPTORS: ACHING;SHARP;SORE

## 2024-01-30 ASSESSMENT — COLUMBIA-SUICIDE SEVERITY RATING SCALE - C-SSRS
2. HAVE YOU ACTUALLY HAD ANY THOUGHTS OF KILLING YOURSELF?: NO
1. IN THE PAST MONTH, HAVE YOU WISHED YOU WERE DEAD OR WISHED YOU COULD GO TO SLEEP AND NOT WAKE UP?: NO
6. HAVE YOU EVER DONE ANYTHING, STARTED TO DO ANYTHING, OR PREPARED TO DO ANYTHING TO END YOUR LIFE?: NO

## 2024-01-30 ASSESSMENT — PAIN - FUNCTIONAL ASSESSMENT: PAIN_FUNCTIONAL_ASSESSMENT: 0-10

## 2024-01-30 ASSESSMENT — PAIN SCALES - GENERAL
PAINLEVEL_OUTOF10: 10 - WORST POSSIBLE PAIN
PAINLEVEL: 10-WORST PAIN EVER

## 2024-01-30 NOTE — PROGRESS NOTES
This is 63 y.o.  male with who has been treated for Lower back pain and right leg and numbness tail bone pain  . Pain is worse, The pain is described as achiness, sharp, and soreness and is relieved by Lying down with who is here for follow-up   Chief Complaint   Patient presents with    Follow-up     4/18/2023 caudal NYASIA       Pain Therapies: Injections patient states that the caudal injection that was given on 4/18/2023 gave him 100% pain relief until now    Opioid Risk Assessment Score 1/26

## 2024-02-05 ENCOUNTER — HOSPITAL ENCOUNTER (OUTPATIENT)
Dept: PAIN MEDICINE | Facility: CLINIC | Age: 64
Discharge: HOME | End: 2024-02-05
Payer: MEDICARE

## 2024-02-05 ENCOUNTER — HOSPITAL ENCOUNTER (OUTPATIENT)
Dept: RADIOLOGY | Facility: CLINIC | Age: 64
Discharge: HOME | End: 2024-02-05
Payer: MEDICARE

## 2024-02-05 VITALS
RESPIRATION RATE: 16 BRPM | SYSTOLIC BLOOD PRESSURE: 101 MMHG | OXYGEN SATURATION: 96 % | DIASTOLIC BLOOD PRESSURE: 67 MMHG | HEART RATE: 66 BPM | TEMPERATURE: 96.8 F

## 2024-02-05 DIAGNOSIS — M45.0 ANKYLOSING SPONDYLITIS OF MULTIPLE SITES IN SPINE (MULTI): ICD-10-CM

## 2024-02-05 DIAGNOSIS — M54.16 LUMBAR RADICULOPATHY: ICD-10-CM

## 2024-02-05 DIAGNOSIS — G25.9 MOVEMENT DISORDER: ICD-10-CM

## 2024-02-05 DIAGNOSIS — S32.050S COMPRESSION FRACTURE OF L5 VERTEBRA, SEQUELA: ICD-10-CM

## 2024-02-05 PROCEDURE — 2500000005 HC RX 250 GENERAL PHARMACY W/O HCPCS

## 2024-02-05 PROCEDURE — 62323 NJX INTERLAMINAR LMBR/SAC: CPT | Performed by: ANESTHESIOLOGY

## 2024-02-05 PROCEDURE — 77003 FLUOROGUIDE FOR SPINE INJECT: CPT

## 2024-02-05 PROCEDURE — A4216 STERILE WATER/SALINE, 10 ML: HCPCS

## 2024-02-05 PROCEDURE — 2500000004 HC RX 250 GENERAL PHARMACY W/ HCPCS (ALT 636 FOR OP/ED)

## 2024-02-05 RX ORDER — METHYLPREDNISOLONE ACETATE 80 MG/ML
INJECTION, SUSPENSION INTRA-ARTICULAR; INTRALESIONAL; INTRAMUSCULAR; SOFT TISSUE
Status: COMPLETED
Start: 2024-02-05 | End: 2024-02-05

## 2024-02-05 RX ORDER — SODIUM CHLORIDE 9 MG/ML
INJECTION, SOLUTION INTRAMUSCULAR; INTRAVENOUS; SUBCUTANEOUS
Status: COMPLETED
Start: 2024-02-05 | End: 2024-02-05

## 2024-02-05 RX ORDER — LIDOCAINE HYDROCHLORIDE 10 MG/ML
INJECTION, SOLUTION EPIDURAL; INFILTRATION; INTRACAUDAL; PERINEURAL
Status: COMPLETED
Start: 2024-02-05 | End: 2024-02-05

## 2024-02-05 RX ADMIN — SODIUM CHLORIDE 10 ML: 9 INJECTION, SOLUTION INTRAMUSCULAR; INTRAVENOUS; SUBCUTANEOUS at 14:18

## 2024-02-05 RX ADMIN — METHYLPREDNISOLONE ACETATE 80 MG: 80 INJECTION, SUSPENSION INTRA-ARTICULAR; INTRALESIONAL; INTRAMUSCULAR; SOFT TISSUE at 14:18

## 2024-02-05 RX ADMIN — LIDOCAINE HYDROCHLORIDE 50 MG: 10 INJECTION, SOLUTION EPIDURAL; INFILTRATION; INTRACAUDAL; PERINEURAL at 14:17

## 2024-02-05 ASSESSMENT — COLUMBIA-SUICIDE SEVERITY RATING SCALE - C-SSRS
6. HAVE YOU EVER DONE ANYTHING, STARTED TO DO ANYTHING, OR PREPARED TO DO ANYTHING TO END YOUR LIFE?: NO
1. IN THE PAST MONTH, HAVE YOU WISHED YOU WERE DEAD OR WISHED YOU COULD GO TO SLEEP AND NOT WAKE UP?: NO
2. HAVE YOU ACTUALLY HAD ANY THOUGHTS OF KILLING YOURSELF?: NO

## 2024-02-05 ASSESSMENT — PAIN - FUNCTIONAL ASSESSMENT: PAIN_FUNCTIONAL_ASSESSMENT: 0-10

## 2024-02-05 ASSESSMENT — PAIN SCALES - GENERAL: PAINLEVEL_OUTOF10: 8

## 2024-02-05 ASSESSMENT — ENCOUNTER SYMPTOMS
LOSS OF SENSATION IN FEET: 1
OCCASIONAL FEELINGS OF UNSTEADINESS: 1
DEPRESSION: 0

## 2024-02-05 NOTE — OP NOTE
Patient has no changes in health medical management or allergies since last visit on 1/30/2024    PRE-OPERATIVE DIAGNOSIS: Lumbosacral radiculopathy,     POST-OPERATIVE DIAGNOSIS:  Same.    PROCEDURE:  Caudal epidural steroid injection    ESTIMATED BLOOD LOSS:  None.    COMPLICATIONS: None    INDICATIONS: This  patient is a pleasant 63 y.o.  male with a significant history of lower back and bilateral lower extremity radiculopathy and claudication.  Clinical exam and diagnostic findings correlate with preoperative diagnoses.  Because of these findings we have elected to proceed with caudal epidural steroid injection.      PROCEDURE: After sufficient consent was signed, the patient was brought to the Operating Room and was placed into the prone position with a pillow underneath the hips. The caudal area was prepped and draped in the usual sterile fashion.     Under fluoroscopic guidance in the lateral views, the sacral hiatus was identified. The skin at the entry site was infiltrated with 1% Lidocaine with Bicarbonate using a Size #25-gauge needle. Under fluoroscopic guidance, a size Spinal needle 22-gauge, 5 inch was introduced gradually until the sacral hiatus was entered. Under an anterior-posterior view, the needle was advanced gradually to the level of the S-1. At that time, 3 cc of Omnipaque 300 were injected and showed excellent distribution of dye into the epidural space extending up to the L5 level and around to the S-2 level, mostly on the bilateral side(s).  A mixture of Depo-Medrol 80 mg. and 0.5% Lido 10cc with a total volume of 20cc with added normal Saline was injected forcefully into the epidural space.  After the injection, an x-ray showed excellent distribution of the dye that extended on the bilateral side up to the L4-L5 level. The needle was flushed and removed.     The patient tolerated the procedure very well and was transferred to the Recovery Room in stable condition.  The patient had stable  resolution of their symptoms

## 2024-02-07 ENCOUNTER — TELEPHONE (OUTPATIENT)
Dept: CARDIOLOGY | Facility: CLINIC | Age: 64
End: 2024-02-07
Payer: MEDICARE

## 2024-02-07 NOTE — TELEPHONE ENCOUNTER
L/M for pt's spouse to call back. Going through old orders and it looks like we need to schedule a CTA chest for November along with a  follow up with Dr. Montaño. Order is already placed.

## 2024-02-09 DIAGNOSIS — I71.20 THORACIC AORTIC ANEURYSM WITHOUT RUPTURE, UNSPECIFIED PART (CMS-HCC): Primary | ICD-10-CM

## 2024-02-12 ENCOUNTER — LAB (OUTPATIENT)
Dept: LAB | Facility: LAB | Age: 64
End: 2024-02-12
Payer: MEDICARE

## 2024-02-12 DIAGNOSIS — J44.9 CHRONIC OBSTRUCTIVE PULMONARY DISEASE, UNSPECIFIED COPD TYPE (MULTI): ICD-10-CM

## 2024-02-12 DIAGNOSIS — K21.9 GASTROESOPHAGEAL REFLUX DISEASE, UNSPECIFIED WHETHER ESOPHAGITIS PRESENT: ICD-10-CM

## 2024-02-12 DIAGNOSIS — J96.11 CHRONIC RESPIRATORY FAILURE WITH HYPOXIA (MULTI): ICD-10-CM

## 2024-02-12 DIAGNOSIS — M45.0 ANKYLOSING SPONDYLITIS OF MULTIPLE SITES IN SPINE (MULTI): ICD-10-CM

## 2024-02-12 DIAGNOSIS — H61.22 IMPACTED CERUMEN, LEFT EAR: ICD-10-CM

## 2024-02-12 DIAGNOSIS — E55.9 VITAMIN D DEFICIENCY: ICD-10-CM

## 2024-02-12 DIAGNOSIS — R73.01 IFG (IMPAIRED FASTING GLUCOSE): ICD-10-CM

## 2024-02-12 DIAGNOSIS — E78.00 HYPERCHOLESTEROLEMIA: ICD-10-CM

## 2024-02-12 DIAGNOSIS — I50.20 SYSTOLIC HEART FAILURE, ACC/AHA STAGE C (MULTI): ICD-10-CM

## 2024-02-12 DIAGNOSIS — Z12.5 SPECIAL SCREENING FOR MALIGNANT NEOPLASM OF PROSTATE: ICD-10-CM

## 2024-02-12 DIAGNOSIS — Z00.00 HEALTHCARE MAINTENANCE: ICD-10-CM

## 2024-02-12 DIAGNOSIS — I10 HTN (HYPERTENSION), BENIGN: ICD-10-CM

## 2024-02-12 LAB
25(OH)D3 SERPL-MCNC: 60 NG/ML (ref 30–100)
ALBUMIN SERPL BCP-MCNC: 4.1 G/DL (ref 3.4–5)
ALP SERPL-CCNC: 51 U/L (ref 33–136)
ALT SERPL W P-5'-P-CCNC: 25 U/L (ref 10–52)
ANION GAP SERPL CALC-SCNC: 8 MMOL/L (ref 10–20)
AST SERPL W P-5'-P-CCNC: 18 U/L (ref 9–39)
BASOPHILS # BLD AUTO: 0.04 X10*3/UL (ref 0–0.1)
BASOPHILS NFR BLD AUTO: 0.5 %
BILIRUB SERPL-MCNC: 0.3 MG/DL (ref 0–1.2)
BUN SERPL-MCNC: 16 MG/DL (ref 6–23)
CALCIUM SERPL-MCNC: 9.2 MG/DL (ref 8.6–10.3)
CHLORIDE SERPL-SCNC: 100 MMOL/L (ref 98–107)
CHOLEST SERPL-MCNC: 159 MG/DL (ref 0–199)
CHOLESTEROL/HDL RATIO: 2.5
CO2 SERPL-SCNC: 32 MMOL/L (ref 21–32)
CREAT SERPL-MCNC: 0.72 MG/DL (ref 0.5–1.3)
EGFRCR SERPLBLD CKD-EPI 2021: >90 ML/MIN/1.73M*2
EOSINOPHIL # BLD AUTO: 0.17 X10*3/UL (ref 0–0.7)
EOSINOPHIL NFR BLD AUTO: 2.2 %
ERYTHROCYTE [DISTWIDTH] IN BLOOD BY AUTOMATED COUNT: 13.6 % (ref 11.5–14.5)
EST. AVERAGE GLUCOSE BLD GHB EST-MCNC: 123 MG/DL
GLUCOSE SERPL-MCNC: 93 MG/DL (ref 74–99)
HBA1C MFR BLD: 5.9 %
HCT VFR BLD AUTO: 43.8 % (ref 41–52)
HDLC SERPL-MCNC: 63 MG/DL
HGB BLD-MCNC: 14.5 G/DL (ref 13.5–17.5)
IMM GRANULOCYTES # BLD AUTO: 0.15 X10*3/UL (ref 0–0.7)
IMM GRANULOCYTES NFR BLD AUTO: 2 % (ref 0–0.9)
LDLC SERPL CALC-MCNC: 71 MG/DL
LYMPHOCYTES # BLD AUTO: 2.08 X10*3/UL (ref 1.2–4.8)
LYMPHOCYTES NFR BLD AUTO: 27.1 %
MCH RBC QN AUTO: 32.8 PG (ref 26–34)
MCHC RBC AUTO-ENTMCNC: 33.1 G/DL (ref 32–36)
MCV RBC AUTO: 99 FL (ref 80–100)
MONOCYTES # BLD AUTO: 0.66 X10*3/UL (ref 0.1–1)
MONOCYTES NFR BLD AUTO: 8.6 %
NEUTROPHILS # BLD AUTO: 4.58 X10*3/UL (ref 1.2–7.7)
NEUTROPHILS NFR BLD AUTO: 59.6 %
NON HDL CHOLESTEROL: 96 MG/DL (ref 0–149)
NRBC BLD-RTO: 0 /100 WBCS (ref 0–0)
PLATELET # BLD AUTO: 237 X10*3/UL (ref 150–450)
POTASSIUM SERPL-SCNC: 3.9 MMOL/L (ref 3.5–5.3)
PROT SERPL-MCNC: 6.1 G/DL (ref 6.4–8.2)
PSA SERPL-MCNC: 0.78 NG/ML
RBC # BLD AUTO: 4.42 X10*6/UL (ref 4.5–5.9)
SODIUM SERPL-SCNC: 136 MMOL/L (ref 136–145)
TRIGL SERPL-MCNC: 123 MG/DL (ref 0–149)
VLDL: 25 MG/DL (ref 0–40)
WBC # BLD AUTO: 7.7 X10*3/UL (ref 4.4–11.3)

## 2024-02-12 PROCEDURE — 80053 COMPREHEN METABOLIC PANEL: CPT

## 2024-02-12 PROCEDURE — 85025 COMPLETE CBC W/AUTO DIFF WBC: CPT

## 2024-02-12 PROCEDURE — 82306 VITAMIN D 25 HYDROXY: CPT

## 2024-02-12 PROCEDURE — G0103 PSA SCREENING: HCPCS

## 2024-02-12 PROCEDURE — 36415 COLL VENOUS BLD VENIPUNCTURE: CPT

## 2024-02-12 PROCEDURE — 80061 LIPID PANEL: CPT

## 2024-02-12 PROCEDURE — 83036 HEMOGLOBIN GLYCOSYLATED A1C: CPT

## 2024-02-19 ENCOUNTER — APPOINTMENT (OUTPATIENT)
Dept: PRIMARY CARE | Facility: CLINIC | Age: 64
End: 2024-02-19
Payer: MEDICARE

## 2024-02-26 DIAGNOSIS — I50.20 NYHA CLASS 3 HEART FAILURE WITH REDUCED EJECTION FRACTION (MULTI): ICD-10-CM

## 2024-02-26 DIAGNOSIS — E78.00 HYPERCHOLESTEROLEMIA: ICD-10-CM

## 2024-02-26 RX ORDER — ATORVASTATIN CALCIUM 80 MG/1
80 TABLET, FILM COATED ORAL NIGHTLY
Qty: 90 TABLET | Refills: 3 | Status: SHIPPED | OUTPATIENT
Start: 2024-02-26 | End: 2025-02-25

## 2024-02-26 RX ORDER — BUMETANIDE 1 MG/1
1 TABLET ORAL DAILY PRN
Qty: 90 TABLET | Refills: 3 | Status: SHIPPED | OUTPATIENT
Start: 2024-02-26 | End: 2025-02-25

## 2024-02-27 ENCOUNTER — OFFICE VISIT (OUTPATIENT)
Dept: PRIMARY CARE | Facility: CLINIC | Age: 64
End: 2024-02-27
Payer: MEDICARE

## 2024-02-27 ENCOUNTER — TELEPHONE (OUTPATIENT)
Dept: PRIMARY CARE | Facility: CLINIC | Age: 64
End: 2024-02-27

## 2024-02-27 ENCOUNTER — HOSPITAL ENCOUNTER (OUTPATIENT)
Dept: CARDIOLOGY | Facility: CLINIC | Age: 64
Discharge: HOME | End: 2024-02-27
Payer: MEDICARE

## 2024-02-27 VITALS
HEIGHT: 63 IN | BODY MASS INDEX: 24.63 KG/M2 | DIASTOLIC BLOOD PRESSURE: 61 MMHG | WEIGHT: 139 LBS | HEART RATE: 79 BPM | SYSTOLIC BLOOD PRESSURE: 93 MMHG

## 2024-02-27 DIAGNOSIS — I25.118 CORONARY ARTERY DISEASE OF NATIVE ARTERY OF NATIVE HEART WITH STABLE ANGINA PECTORIS (CMS-HCC): ICD-10-CM

## 2024-02-27 DIAGNOSIS — R17 YELLOW SKIN: ICD-10-CM

## 2024-02-27 DIAGNOSIS — I48.91 ATRIAL FIBRILLATION, UNSPECIFIED TYPE (MULTI): ICD-10-CM

## 2024-02-27 DIAGNOSIS — R73.01 IFG (IMPAIRED FASTING GLUCOSE): ICD-10-CM

## 2024-02-27 DIAGNOSIS — I71.21 ANEURYSM OF ASCENDING AORTA WITHOUT RUPTURE (CMS-HCC): ICD-10-CM

## 2024-02-27 DIAGNOSIS — K44.9 HIATAL HERNIA: ICD-10-CM

## 2024-02-27 DIAGNOSIS — M25.552 LEFT HIP PAIN: ICD-10-CM

## 2024-02-27 DIAGNOSIS — D69.6 THROMBOCYTOPENIA, UNSPECIFIED (CMS-HCC): ICD-10-CM

## 2024-02-27 DIAGNOSIS — Z95.810 PRESENCE OF AUTOMATIC CARDIOVERTER/DEFIBRILLATOR (AICD): ICD-10-CM

## 2024-02-27 DIAGNOSIS — I50.20 SYSTOLIC HEART FAILURE, ACC/AHA STAGE C (MULTI): ICD-10-CM

## 2024-02-27 DIAGNOSIS — M79.652 LEFT THIGH PAIN: ICD-10-CM

## 2024-02-27 DIAGNOSIS — J44.9 CHRONIC OBSTRUCTIVE PULMONARY DISEASE, UNSPECIFIED COPD TYPE (MULTI): ICD-10-CM

## 2024-02-27 DIAGNOSIS — R63.4 UNEXPLAINED WEIGHT LOSS: ICD-10-CM

## 2024-02-27 DIAGNOSIS — E78.00 HYPERCHOLESTEROLEMIA: ICD-10-CM

## 2024-02-27 DIAGNOSIS — I47.20 VT (VENTRICULAR TACHYCARDIA) (MULTI): ICD-10-CM

## 2024-02-27 DIAGNOSIS — M45.0 ANKYLOSING SPONDYLITIS OF MULTIPLE SITES IN SPINE (MULTI): ICD-10-CM

## 2024-02-27 DIAGNOSIS — M70.62 TROCHANTERIC BURSITIS, LEFT HIP: ICD-10-CM

## 2024-02-27 DIAGNOSIS — K51.919 ULCERATIVE COLITIS WITH COMPLICATION, UNSPECIFIED LOCATION (MULTI): ICD-10-CM

## 2024-02-27 DIAGNOSIS — R73.01 FASTING HYPERGLYCEMIA: ICD-10-CM

## 2024-02-27 DIAGNOSIS — K21.9 GASTROESOPHAGEAL REFLUX DISEASE, UNSPECIFIED WHETHER ESOPHAGITIS PRESENT: ICD-10-CM

## 2024-02-27 DIAGNOSIS — I10 HTN (HYPERTENSION), BENIGN: ICD-10-CM

## 2024-02-27 DIAGNOSIS — R10.13 EPIGASTRIC PAIN: Primary | ICD-10-CM

## 2024-02-27 PROCEDURE — 99215 OFFICE O/P EST HI 40 MIN: CPT | Performed by: INTERNAL MEDICINE

## 2024-02-27 PROCEDURE — 3078F DIAST BP <80 MM HG: CPT | Performed by: INTERNAL MEDICINE

## 2024-02-27 PROCEDURE — 3074F SYST BP LT 130 MM HG: CPT | Performed by: INTERNAL MEDICINE

## 2024-02-27 RX ORDER — DICLOFENAC SODIUM 10 MG/G
GEL TOPICAL
Qty: 100 G | Refills: 0 | Status: SHIPPED | OUTPATIENT
Start: 2024-02-27

## 2024-02-27 RX ORDER — TRIAMCINOLONE ACETONIDE 5 MG/G
1 CREAM TOPICAL 2 TIMES DAILY
Qty: 100 G | Refills: 2 | Status: SHIPPED | OUTPATIENT
Start: 2024-02-27 | End: 2024-02-27 | Stop reason: WASHOUT

## 2024-02-27 RX ORDER — OMEPRAZOLE 20 MG/1
20 CAPSULE, DELAYED RELEASE ORAL
Qty: 60 CAPSULE | Refills: 11 | Status: SHIPPED | OUTPATIENT
Start: 2024-02-27 | End: 2025-02-26

## 2024-02-27 RX ORDER — LISINOPRIL 20 MG/1
10 TABLET ORAL DAILY
Qty: 90 TABLET | Refills: 3 | Status: SHIPPED | OUTPATIENT
Start: 2024-02-27

## 2024-02-27 ASSESSMENT — ENCOUNTER SYMPTOMS
PSYCHIATRIC NEGATIVE: 1
NAUSEA: 1
EYES NEGATIVE: 1
JOINT SWELLING: 0
SHORTNESS OF BREATH: 0
ABDOMINAL PAIN: 0
CHILLS: 0
VOMITING: 0
NUMBNESS: 0
FEVER: 0
MUSCULOSKELETAL NEGATIVE: 1
HEADACHES: 0
ADENOPATHY: 0
AGITATION: 0
DYSURIA: 0
PALPITATIONS: 0
WHEEZING: 0
ABDOMINAL DISTENTION: 0
CONSTITUTIONAL NEGATIVE: 1
NEUROLOGICAL NEGATIVE: 1
HEMATOLOGIC/LYMPHATIC NEGATIVE: 1
RESPIRATORY NEGATIVE: 1
CARDIOVASCULAR NEGATIVE: 1
NECK STIFFNESS: 0
ENDOCRINE NEGATIVE: 1
LIGHT-HEADEDNESS: 0
CONFUSION: 0
EYE DISCHARGE: 0
COUGH: 0
BACK PAIN: 0
CONSTIPATION: 1
ALLERGIC/IMMUNOLOGIC NEGATIVE: 1
DIARRHEA: 1

## 2024-02-27 NOTE — PROGRESS NOTES
Subjective   Patient ID: Horacio Gupta is a 63 y.o. male who presents for Follow-up (4 MO FU LAB).  HERE FOR FU WITH LABS  CO SWALLOWING DIFFICULTY AND NV AFTER EATING, EPIGASTRIC PAIN  APPETITE NOT VERY GOOD  HAS BEEN DRINKING ENSURE  HAS LOST WT  GOING ON FOR 2-3 WEEKS  HAS BEEN HAVING CONSTIPATIONS        Review of Systems   Constitutional: Negative.  Negative for chills and fever.   HENT: Negative.  Negative for congestion.    Eyes: Negative.  Negative for discharge.   Respiratory: Negative.  Negative for cough, shortness of breath and wheezing.    Cardiovascular: Negative.  Negative for chest pain, palpitations and leg swelling.   Gastrointestinal:  Positive for constipation, diarrhea and nausea. Negative for abdominal distention, abdominal pain and vomiting.   Endocrine: Negative.    Genitourinary: Negative.  Negative for dysuria and urgency.   Musculoskeletal: Negative.  Negative for back pain, joint swelling and neck stiffness.   Skin: Negative.  Negative for rash.   Allergic/Immunologic: Negative.  Negative for immunocompromised state.   Neurological: Negative.  Negative for light-headedness, numbness and headaches.   Hematological: Negative.  Negative for adenopathy.   Psychiatric/Behavioral: Negative.  Negative for agitation, behavioral problems and confusion.    All other systems reviewed and are negative.      Objective   Physical Exam  Vitals reviewed.   Constitutional:       General: He is not in acute distress.     Appearance: He is ill-appearing (CHRONICALLY).   HENT:      Head: Normocephalic and atraumatic.      Nose: Nose normal.   Eyes:      Conjunctiva/sclera: Conjunctivae normal.      Pupils: Pupils are equal, round, and reactive to light.   Neck:      Vascular: No carotid bruit.   Cardiovascular:      Rate and Rhythm: Normal rate and regular rhythm.      Pulses: Normal pulses.      Heart sounds:      No gallop.   Pulmonary:      Effort: Pulmonary effort is normal. No respiratory  "distress.      Breath sounds: No wheezing.      Comments: DIMINISHED BS    Abdominal:      General: Bowel sounds are normal.      Palpations: Abdomen is soft.      Tenderness: There is abdominal tenderness (MILD EPIGASTRIC).   Musculoskeletal:         General: Normal range of motion.      Cervical back: Normal range of motion. No rigidity.   Lymphadenopathy:      Cervical: No cervical adenopathy.   Skin:     General: Skin is warm.      Findings: No rash.   Neurological:      General: No focal deficit present.      Mental Status: He is alert and oriented to person, place, and time.   Psychiatric:         Mood and Affect: Mood normal.         Behavior: Behavior normal.       BP 93/61 (BP Location: Right arm, Patient Position: Sitting)   Pulse 79   Ht 1.6 m (5' 3\")   Wt 63 kg (139 lb)   BMI 24.62 kg/m²    PSA   Date/Time Value Ref Range Status   01/31/2022 06:55 AM 1.29 0.00 - 4.00 ng/mL Final     Comment:     The FDA requires that the method used for PSA assay be   reported to the physician. Values obtained with different   assay methods must not be used interchangeably. This test  was performed at [facility] using the Access   Hybritech PSA assay is a two-site immunoenzymatic sandwich   assay. The assay is approved for measurement of   prostate-specific antigen (PSA)in serum and may be used   in conjunction with a digital rectal examination in men   50 years and older as an aid in detection of prostate   cancer.  5-Alpha-reductase inhibitors (e.g. Proscar, Finasteride,   Avodart, Dutasteride and Zahira) for the treatment of BPH   have been shown to lower PSA levels by an average of 50%   after 6 months of treatment.       Hemoglobin A1C   Date/Time Value Ref Range Status   02/12/2024 06:51 AM 5.9 (H) see below % Final     Assessment/Plan   Problem List Items Addressed This Visit       Abdominal pain - Primary    Relevant Orders    EGD    Colonoscopy Diagnostic    CT chest w IV contrast    CT " abdomen pelvis w IV contrast    Ankylosing spondylitis (CMS/HCC)    Aortic aneurysm (CMS/HCC)    Relevant Orders    CT chest w IV contrast    CAD (coronary artery disease)    Chronic obstructive pulmonary disease (CMS/HCC)    Fasting hyperglycemia    GERD (gastroesophageal reflux disease)    Relevant Medications    omeprazole (PriLOSEC) 20 mg DR capsule    Hiatal hernia    Relevant Orders    EGD    HTN (hypertension), benign    Hypercholesterolemia    Relevant Medications    lisinopril 20 mg tablet    Systolic heart failure, ACC/AHA stage C (CMS/HCC)    Ulcerative colitis (CMS/HCC)    Relevant Orders    Colonoscopy Diagnostic    CT chest w IV contrast    CT abdomen pelvis w IV contrast    Yellow skin    Left hip pain    Relevant Medications    diclofenac sodium (Voltaren) 1 % gel    IFG (impaired fasting glucose)    Thrombocytopenia, unspecified (CMS/HCC)     Other Visit Diagnoses       Trochanteric bursitis, left hip        Relevant Medications    diclofenac sodium (Voltaren) 1 % gel    Left thigh pain        Relevant Medications    diclofenac sodium (Voltaren) 1 % gel    Unexplained weight loss        Relevant Orders    CT chest w IV contrast    CT abdomen pelvis w IV contrast    Atrial fibrillation, unspecified type (CMS/HCC)                 Labs reviewed with pt      MDM    1) COMPLEXITY: 1 OR MORE CHRONIC ILLNESS WITH SEVERE EXACERBATION, PROGRESSION OR SIDE EFFECTS TO TREATMENT  2)DATA: TESTS INTERPRETED AND OR ORDERED, TOOK INDEPENDENT HISTORY OR RECORDS REVIEWED, CASE DISCUSSED WITH ANOTHER PROVIDER  3)RISK: HIGH RISK DUE TO NATURE OF MEDICAL CONDITIONS/COMORBIDITY OR MEDICATIONS ORDERED OR SURGICAL OR PROCEDURE REFERRAL, OR REFERRED TO HOSPITAL .

## 2024-02-27 NOTE — TELEPHONE ENCOUNTER
PATIENT WILL NEED BUN AND CREATINE ORDERS IN PRIOR TO HIS CT.  THEY ARE SCHEDULED ON SATURDAY    ALSO WIFE LEFT MESSAGE CONCERNING HIS PLAVIX AND ASPIRIN SHE SAID CARDIO TOLD THEM HE WAS OKAY TO STOP PLAVIX FOR 5 DAYS BUT WANTED HIM TO CONTINUE ASPIRIN PLEASE ADVISE

## 2024-03-02 ENCOUNTER — APPOINTMENT (OUTPATIENT)
Dept: RADIOLOGY | Facility: HOSPITAL | Age: 64
End: 2024-03-02
Payer: MEDICARE

## 2024-03-02 ENCOUNTER — HOSPITAL ENCOUNTER (OUTPATIENT)
Dept: RADIOLOGY | Facility: HOSPITAL | Age: 64
Discharge: HOME | End: 2024-03-02
Payer: MEDICARE

## 2024-03-02 DIAGNOSIS — R63.4 UNEXPLAINED WEIGHT LOSS: ICD-10-CM

## 2024-03-02 DIAGNOSIS — K51.919 ULCERATIVE COLITIS WITH COMPLICATION, UNSPECIFIED LOCATION (MULTI): ICD-10-CM

## 2024-03-02 DIAGNOSIS — R10.13 EPIGASTRIC PAIN: ICD-10-CM

## 2024-03-02 PROCEDURE — 71260 CT THORAX DX C+: CPT | Performed by: STUDENT IN AN ORGANIZED HEALTH CARE EDUCATION/TRAINING PROGRAM

## 2024-03-02 PROCEDURE — 74177 CT ABD & PELVIS W/CONTRAST: CPT

## 2024-03-02 PROCEDURE — 74177 CT ABD & PELVIS W/CONTRAST: CPT | Performed by: STUDENT IN AN ORGANIZED HEALTH CARE EDUCATION/TRAINING PROGRAM

## 2024-03-02 PROCEDURE — A9698 NON-RAD CONTRAST MATERIALNOC: HCPCS | Mod: MUE | Performed by: INTERNAL MEDICINE

## 2024-03-02 PROCEDURE — 2550000001 HC RX 255 CONTRASTS: Mod: MUE | Performed by: INTERNAL MEDICINE

## 2024-03-02 RX ADMIN — IOHEXOL 500 ML: 12 SOLUTION ORAL at 10:34

## 2024-03-02 RX ADMIN — IOHEXOL 73 ML: 350 INJECTION, SOLUTION INTRAVENOUS at 10:15

## 2024-03-05 RX ORDER — FLUTICASONE PROPIONATE AND SALMETEROL XINAFOATE 230; 21 UG/1; UG/1
2 AEROSOL, METERED RESPIRATORY (INHALATION)
COMMUNITY
Start: 2024-03-01

## 2024-03-11 ENCOUNTER — HOSPITAL ENCOUNTER (EMERGENCY)
Facility: HOSPITAL | Age: 64
Discharge: HOME | End: 2024-03-11
Attending: EMERGENCY MEDICINE
Payer: MEDICARE

## 2024-03-11 ENCOUNTER — TELEPHONE (OUTPATIENT)
Dept: PRIMARY CARE | Facility: CLINIC | Age: 64
End: 2024-03-11
Payer: MEDICARE

## 2024-03-11 DIAGNOSIS — K62.5 RECTAL BLEEDING: Primary | ICD-10-CM

## 2024-03-11 LAB
ALBUMIN SERPL BCP-MCNC: 4.2 G/DL (ref 3.4–5)
ALP SERPL-CCNC: 53 U/L (ref 33–136)
ALT SERPL W P-5'-P-CCNC: 18 U/L (ref 10–52)
ANION GAP SERPL CALC-SCNC: 8 MMOL/L (ref 10–20)
AST SERPL W P-5'-P-CCNC: 19 U/L (ref 9–39)
BASOPHILS # BLD AUTO: 0.03 X10*3/UL (ref 0–0.1)
BASOPHILS NFR BLD AUTO: 0.4 %
BILIRUB SERPL-MCNC: 0.3 MG/DL (ref 0–1.2)
BUN SERPL-MCNC: 12 MG/DL (ref 6–23)
CALCIUM SERPL-MCNC: 9.2 MG/DL (ref 8.6–10.3)
CHLORIDE SERPL-SCNC: 101 MMOL/L (ref 98–107)
CO2 SERPL-SCNC: 31 MMOL/L (ref 21–32)
CREAT SERPL-MCNC: 0.7 MG/DL (ref 0.5–1.3)
EGFRCR SERPLBLD CKD-EPI 2021: >90 ML/MIN/1.73M*2
EOSINOPHIL # BLD AUTO: 0.08 X10*3/UL (ref 0–0.7)
EOSINOPHIL NFR BLD AUTO: 1.1 %
ERYTHROCYTE [DISTWIDTH] IN BLOOD BY AUTOMATED COUNT: 13.5 % (ref 11.5–14.5)
GLUCOSE SERPL-MCNC: 75 MG/DL (ref 74–99)
HCT VFR BLD AUTO: 45.4 % (ref 41–52)
HGB BLD-MCNC: 14.7 G/DL (ref 13.5–17.5)
IMM GRANULOCYTES # BLD AUTO: 0.03 X10*3/UL (ref 0–0.7)
IMM GRANULOCYTES NFR BLD AUTO: 0.4 % (ref 0–0.9)
LIPASE SERPL-CCNC: 46 U/L (ref 9–82)
LYMPHOCYTES # BLD AUTO: 1.98 X10*3/UL (ref 1.2–4.8)
LYMPHOCYTES NFR BLD AUTO: 26.4 %
MCH RBC QN AUTO: 32.1 PG (ref 26–34)
MCHC RBC AUTO-ENTMCNC: 32.4 G/DL (ref 32–36)
MCV RBC AUTO: 99 FL (ref 80–100)
MONOCYTES # BLD AUTO: 0.68 X10*3/UL (ref 0.1–1)
MONOCYTES NFR BLD AUTO: 9.1 %
NEUTROPHILS # BLD AUTO: 4.71 X10*3/UL (ref 1.2–7.7)
NEUTROPHILS NFR BLD AUTO: 62.6 %
NRBC BLD-RTO: 0 /100 WBCS (ref 0–0)
PLATELET # BLD AUTO: 202 X10*3/UL (ref 150–450)
POTASSIUM SERPL-SCNC: 4.1 MMOL/L (ref 3.5–5.3)
PROT SERPL-MCNC: 6.4 G/DL (ref 6.4–8.2)
RBC # BLD AUTO: 4.58 X10*6/UL (ref 4.5–5.9)
SODIUM SERPL-SCNC: 136 MMOL/L (ref 136–145)
WBC # BLD AUTO: 7.5 X10*3/UL (ref 4.4–11.3)

## 2024-03-11 PROCEDURE — 80053 COMPREHEN METABOLIC PANEL: CPT | Performed by: EMERGENCY MEDICINE

## 2024-03-11 PROCEDURE — 85025 COMPLETE CBC W/AUTO DIFF WBC: CPT | Performed by: EMERGENCY MEDICINE

## 2024-03-11 PROCEDURE — 36415 COLL VENOUS BLD VENIPUNCTURE: CPT | Performed by: EMERGENCY MEDICINE

## 2024-03-11 PROCEDURE — 99283 EMERGENCY DEPT VISIT LOW MDM: CPT

## 2024-03-11 PROCEDURE — 83690 ASSAY OF LIPASE: CPT | Performed by: EMERGENCY MEDICINE

## 2024-03-11 ASSESSMENT — COLUMBIA-SUICIDE SEVERITY RATING SCALE - C-SSRS
6. HAVE YOU EVER DONE ANYTHING, STARTED TO DO ANYTHING, OR PREPARED TO DO ANYTHING TO END YOUR LIFE?: NO
2. HAVE YOU ACTUALLY HAD ANY THOUGHTS OF KILLING YOURSELF?: NO
1. IN THE PAST MONTH, HAVE YOU WISHED YOU WERE DEAD OR WISHED YOU COULD GO TO SLEEP AND NOT WAKE UP?: NO

## 2024-03-11 ASSESSMENT — PAIN DESCRIPTION - PAIN TYPE: TYPE: ACUTE PAIN

## 2024-03-11 ASSESSMENT — PAIN DESCRIPTION - DESCRIPTORS: DESCRIPTORS: ACHING

## 2024-03-11 ASSESSMENT — PAIN DESCRIPTION - ORIENTATION: ORIENTATION: LOWER

## 2024-03-11 ASSESSMENT — PAIN DESCRIPTION - PROGRESSION: CLINICAL_PROGRESSION: GRADUALLY WORSENING

## 2024-03-11 ASSESSMENT — PAIN SCALES - GENERAL: PAINLEVEL_OUTOF10: 7

## 2024-03-11 ASSESSMENT — PAIN - FUNCTIONAL ASSESSMENT: PAIN_FUNCTIONAL_ASSESSMENT: 0-10

## 2024-03-11 ASSESSMENT — PAIN DESCRIPTION - LOCATION: LOCATION: ABDOMEN

## 2024-03-11 ASSESSMENT — PAIN DESCRIPTION - FREQUENCY: FREQUENCY: INTERMITTENT

## 2024-03-11 ASSESSMENT — PAIN DESCRIPTION - ONSET: ONSET: GRADUAL

## 2024-03-11 NOTE — TELEPHONE ENCOUNTER
PT SCHEDULED FOR COLONOSCOPY BUT NOW WITH BM HE IS NOTICING BLOOD CLOTS AND BLEEDING SHOULD HE CONTINUE AND COMPLETE PREP [phone]

## 2024-03-11 NOTE — ED PROVIDER NOTES
"HPI   Chief Complaint   Patient presents with    Rectal Bleeding     Patient to Ed reference rectal bleeding  and bloody stools x 2 weeks. He is scheduled for a colonoscopy but is not been able to get an apoitment.     Black or Bloody Stool       Patient presents to the emergency department secondary to rectal bleeding.  He states that this has been intermittent for approximately 2 weeks.  He states that he has recently been seen by his private physician for this issue and has an upcoming colonoscopy although his PCP is not aware of the bleeding concern.  He states that he has chronic ongoing \"digestive issues\" and recently had a CAT scan for the same.  At the time of arrival the patient denies abdominal pain and states that he \"just feels tired\".  Called his private physician's office today who referred him to the emergency room to be evaluated.      History provided by:  Patient and spouse   used: No                        No data recorded                   Patient History   Past Medical History:   Diagnosis Date    Ankylosing spondylitis of unspecified sites in spine (CMS/[facility]) 06/08/2022    Ankylosing spondylitis    Aortic aneurysm of unspecified site, without rupture (CMS/[facility]) 04/26/2022    Aortic aneurysm    Atherosclerotic heart disease of native coronary artery without angina pectoris 07/27/2022    CAD (coronary artery disease)    Chronic obstructive pulmonary disease, unspecified (CMS/[facility]) 10/11/2022    Chronic obstructive pulmonary disease    Essential (primary) hypertension 10/11/2022    HTN (hypertension), benign    Gastro-esophageal reflux disease without esophagitis 10/11/2022    GERD (gastroesophageal reflux disease)    Noninfective gastroenteritis and colitis, unspecified 06/08/2022    Inflammatory bowel diseases (IBD)    Obstructive sleep apnea (adult) (pediatric) 10/27/2020    Obstructive sleep apnea    Other idiopathic scoliosis, site unspecified 08/17/2020    Idiopathic " scoliosis in adult patient    Pure hypercholesterolemia, unspecified 06/08/2022    Hypercholesterolemia    Ulcerative colitis, unspecified, without complications (CMS/[facility]) 10/11/2022    Ulcerative colitis    Unilateral inguinal hernia, without obstruction or gangrene, not specified as recurrent 12/10/2019    Right inguinal hernia    Unilateral primary osteoarthritis, right hip 03/05/2020    Arthritis of right hip    Unspecified systolic (congestive) heart failure (CMS/[facility]) 10/11/2022    Systolic heart failure, ACC/AHA stage C    Ventral hernia without obstruction or gangrene 11/25/2019    Ventral hernia    Vitamin D deficiency, unspecified 10/11/2022    Vitamin D deficiency     Past Surgical History:   Procedure Laterality Date    ANKLE SURGERY      CARDIAC DEFIBRILLATOR PLACEMENT      COLONOSCOPY      CORONARY ANGIOPLASTY WITH STENT PLACEMENT  01/20/2017    Cath Stent Placement    ESOPHAGOGASTRODUODENOSCOPY      OTHER SURGICAL HISTORY  07/19/2021    Intra-articular corticosteroid injection    OTHER SURGICAL HISTORY  12/10/2019    Finger surgical procedure     Family History   Problem Relation Name Age of Onset    Stroke Mother      Diabetes Mother      Heart failure Mother      Hypertension Mother      Heart failure Father      Hypertension Father      Diabetes Brother      Other (C A B G x1) Brother       Social History     Tobacco Use    Smoking status: Every Day     Packs/day: .25     Types: Cigarettes     Passive exposure: Current    Smokeless tobacco: Never   Vaping Use    Vaping Use: Never used   Substance Use Topics    Alcohol use: Not Currently    Drug use: Never       Physical Exam   ED Triage Vitals [03/11/24 1216]   Temperature Heart Rate Respirations BP   36.3 °C (97.3 °F) 77 18 124/78      Pulse Ox Temp Source Heart Rate Source Patient Position   94 % Temporal -- Lying      BP Location FiO2 (%)     Left arm --       Physical Exam  Vitals and nursing note reviewed.   Constitutional:       General: He is  not in acute distress.     Appearance: Normal appearance. He is normal weight. He is not ill-appearing, toxic-appearing or diaphoretic.   HENT:      Head: Normocephalic and atraumatic.      Nose: Nose normal. No rhinorrhea.   Neck:      Comments: Trachea is midline  Cardiovascular:      Rate and Rhythm: Normal rate and regular rhythm.      Heart sounds: No murmur heard.  Pulmonary:      Effort: Pulmonary effort is normal.      Breath sounds: Normal breath sounds. No wheezing.   Abdominal:      General: Abdomen is flat. Bowel sounds are normal. There is no distension.      Palpations: Abdomen is soft.      Tenderness: There is no abdominal tenderness.   Genitourinary:     Comments: Patient has several external, soft, nonbleeding, nonthrombosed hemorrhoids.  No anal fissures or active bleeding.  Musculoskeletal:         General: Normal range of motion.      Cervical back: Normal range of motion.   Skin:     General: Skin is warm and dry.      Findings: No rash.   Neurological:      General: No focal deficit present.      Mental Status: He is alert and oriented to person, place, and time. Mental status is at baseline.   Psychiatric:         Mood and Affect: Mood normal.         Behavior: Behavior normal.         Thought Content: Thought content normal.         Judgment: Judgment normal.         ED Course & MDM   Diagnoses as of 03/11/24 1346   Rectal bleeding       Medical Decision Making  I did review the patient's recent CAT scan reports with the patient and his wife.  I advised him about the importance of following up about the left upper lobe pulmonary nodules and the pancreatic head lesion with his private physician who ordered the studies.  In regards to the rectal bleeding I feel he can be discharged as he is not hypotensive, tachycardic, or anemic.  There is also no evidence of active bleeding on his exam today.  He is already scheduled for a colonoscopy.  Reassurance was given.  Follow-up as discussed and  return if worse.        Procedure  Procedures     Kobi Velasco,   03/11/24 0983

## 2024-03-13 ENCOUNTER — DOCUMENTATION (OUTPATIENT)
Dept: PHYSICAL THERAPY | Facility: CLINIC | Age: 64
End: 2024-03-13
Payer: MEDICARE

## 2024-03-13 NOTE — PROGRESS NOTES
Physical Therapy    Discharge Summary    Name: Horacio Gupta  MRN: [id number]  : 1960  Date: 24    Discharge Summary: PT        Has been seen in clinical physical therapy beginning on 10/25 for 23  visit (s); there has been no further visits attended. DC from PT

## 2024-03-21 ENCOUNTER — APPOINTMENT (OUTPATIENT)
Dept: GASTROENTEROLOGY | Facility: CLINIC | Age: 64
End: 2024-03-21
Payer: MEDICARE

## 2024-03-27 ENCOUNTER — APPOINTMENT (OUTPATIENT)
Dept: PRIMARY CARE | Facility: CLINIC | Age: 64
End: 2024-03-27
Payer: MEDICARE

## 2024-04-01 NOTE — PROGRESS NOTES
SUBJECTIVE:  This is 63 y.o.  male with PMH of smoker with depression, hypertension COPD, L5 compression fracture, ankylosing spondylitis, Ulcerative colitis on Remicade infusion, CAD with stents, congestive heart failure with ICD implant on Plavix, EMG on 6/13/2023 by Sinai Koenig showed severe L5 radiculopathy with active denervation on gabapentin 600 mg 3 times daily who received caudal NYASIA on 2/5/2024,  who is here for follow-up stating that the injection has helped him by 100% the pain is just now starting to come back mildly.  The patient is here and wants to have another injection before it gets bad.  I placed another order for another injection for him and I told him he does not need to have it done for couple months.  He can schedule it for later.  The patient did very well with aquatic therapy I ordered another aquatic therapy for him.  Unfortunately despite the fact he is Medicare but his insurance does not cover for his Silver sneaker which something he benefited greatly from.      Prior office visit:  1/30/2024: This is 63 y.o.  male with PMH of smoker with depression, hypertension COPD, L5 compression fracture, ankylosing spondylitis, Ulcerative colitis on Remicade infusion, CAD with stents, congestive heart failure with ICD implant on Plavix, EMG on 6/13/2023 by Sinai Koenig showed severe L5 radiculopathy with active denervation who received caudal epidural steroid injection with 100% relief in 2023 who is here for follow-up.     Patient reports right leg from lateral knee to his toes. He also noticed right hand tremor since summer 2023 and now has progressed to his legs. He also has tail bone and left hip pain. He would like a repeat caudal injection similar to what he had prior to his injection (had recurred about a week ago).                 Last procedure:   2/5/2024 caudal NYASIA the patient has had a 80% improvement in pain and function  4/18/2023 caudal NYASIA the patient has had a 100% improvement  in pain and function     Portions of record reviewed for pertinent issues: active problem list, medication list, allergies, family history, social history, notes from last encounter, encounters, lab results, imaging and other system records.        I have personally reviewed the [facility] report for this patient. This report is scanned into the electronic medical record. I have considered the risks of abuse, dependence, addiction and diversion. It showed few gabapentin 300 mg x 2 3 times daily from us   OPIOID RISK ASSESSMENT SCORE 0/26  Aberrant behavior: None        Employment/pending law suits: Retired on disability  Social History:  lives with his wife at home has 4 children, Denies smoking drinking or use of illicit drugs                 Diagnostic studies:  2/6/2023 lumbar x-ray showed severe degenerative changes with ankylosing spondylitis most likely with facet joint disease and osteopenia     2/3/2023 CT thoracic spine  IMPRESSION:  1. No acute fracture of the thoracic spine. Remote right L1  transverse process mildly displaced fracture.  2. Moderate multilevel degenerative changes most prominent at T10-T11  with superimposed findings of DISH.  3. Emphysema within the imaged lung fields.  4. 4.4 cm ascending thoracic aorta aneurysm.     :  2/3/2023 CT lumbar spine and thoracic spine showed:  FINDINGS:  OSSEOUS STRUCTURES:  There is a mildly distracted transverse fracture identified through  the mid to superior aspect of the L5 vertebral body. A minimally  displaced transverse fracture is seen through the right transverse  process at L4 no additional fractures are seen. The vertebral bodies  are well aligned without evidence of subluxation.     Mild disc space narrowing and bridging osteophyte formation is seen  throughout the visualized thoracolumbar spine. Moderate to severe  facet degenerative changes are seen throughout the lumbar spine.     LOWER THORACIC SPINE:  No gross central canal or neural  foraminal narrowing is seen.     T12-L1:  No gross central canal or neural foraminal narrowing is seen.     L1-2:  No gross central canal or neural foraminal narrowing is seen.     L2-3:  No gross central canal or neural foraminal narrowing is seen.     L3-4:  No gross central canal or neural foraminal narrowing is seen.     L4-5:  At least mild central canal narrowing is seen. No significant neural  foraminal narrowing is seen.     L5-S1:  Mild neural foraminal narrowing is seen bilaterally. No significant  central canal narrowing is seen.     ASSOCIATED STRUCTURES:  Evaluation of the visualized soft tissues of the abdomen is limited  by the lack of intravenous contrast. Within this limitation,  heterogeneous increased density is seen throughout the mid to  inferior aspect of the retroperitoneum, most consistent with  hemorrhage.     IMPRESSION:  1. L5 vertebral body fracture, as above.  2. Right L4 transverse process fracture.  3. Degenerative changes, as described above.              Review of Systems   HENT: Negative.     Eyes: Negative.    Respiratory: Negative.     Cardiovascular: Negative.    Gastrointestinal: Negative.    Endocrine: Negative.    Genitourinary: Negative.    Musculoskeletal:  Positive for back pain and myalgias.   Skin: Negative.    Neurological: Negative.    Hematological: Negative.    Psychiatric/Behavioral: Negative.          Physical Exam  Vitals and nursing note reviewed.   Constitutional:       Appearance: Normal appearance.       HENT:      Head: Normocephalic and atraumatic.      Nose: Nose normal.   Eyes:      Extraocular Movements: Extraocular movements intact.      Conjunctiva/sclera: Conjunctivae normal.      Pupils: Pupils are equal, round, and reactive to light.   Cardiovascular:      Rate and Rhythm: Normal rate and regular rhythm.      Pulses: Normal pulses.      Heart sounds: Normal heart sounds.   Pulmonary:      Effort: Pulmonary effort is normal.      Breath sounds: Normal  breath sounds.   Abdominal:      General: Abdomen is flat. Bowel sounds are normal.      Palpations: Abdomen is soft.   Musculoskeletal:         General: Tenderness present.   Skin:     General: Skin is warm.   Neurological:      General: No focal deficit present.      Mental Status: He is alert and oriented to person, place, and time.   Psychiatric:         Mood and Affect: Mood normal.         Behavior: Behavior normal.                      Plan  At least 50% of the visit was involved in the discussion of the options for treatment. We discussed exercises, medication, interventional therapies and surgery. Healthy life style is essential with patient hard work to achieve the wellness. In addition; discussion with the patient and/or family about any of the diagnostic results, impressions and/or recommended diagnostic studies, prognosis, risks and benefits of treatment options, instructions for treatment and/or follow-up, importance of compliance with chosen treatment options, risk-factor reduction, and patient/family education.         Pool therapy, walking in the pool, at least 3x per week for 30 minutes  Continue self-directed physical therapy  Caudal epidural steroid injection after lorazepam  Aquatic therapy ordered for the patient and highly recommend to continue aquatic therapy on his own through [employer] program  Healthy lifestyle and anti-inflammatory diet in addition to weight control discussed with the patient  Alternative chronic pain therapies was discussed, encouraged and information was handed  Return to Clinic 3 months     *Please note this report has been produced using speech recognition software and may contain errors related to that system including grammar, punctuation and spelling as well as words and phrases that may be inappropriate. If there are questions or concerns, please feel free to contact me to clarify.    Diaz Ansari MD

## 2024-04-02 ENCOUNTER — OFFICE VISIT (OUTPATIENT)
Dept: PAIN MEDICINE | Facility: CLINIC | Age: 64
End: 2024-04-02
Payer: MEDICARE

## 2024-04-02 VITALS
RESPIRATION RATE: 18 BRPM | HEIGHT: 63 IN | SYSTOLIC BLOOD PRESSURE: 89 MMHG | WEIGHT: 138 LBS | TEMPERATURE: 97.2 F | OXYGEN SATURATION: 92 % | DIASTOLIC BLOOD PRESSURE: 66 MMHG | BODY MASS INDEX: 24.45 KG/M2

## 2024-04-02 DIAGNOSIS — S32.050S COMPRESSION FRACTURE OF L5 VERTEBRA, SEQUELA: ICD-10-CM

## 2024-04-02 DIAGNOSIS — S32.049A CLOSED FRACTURE OF FOURTH LUMBAR VERTEBRA, UNSPECIFIED FRACTURE MORPHOLOGY, INITIAL ENCOUNTER (MULTI): ICD-10-CM

## 2024-04-02 DIAGNOSIS — M47.27 LUMBOSACRAL SPONDYLOSIS WITH RADICULOPATHY: Primary | ICD-10-CM

## 2024-04-02 PROCEDURE — 99214 OFFICE O/P EST MOD 30 MIN: CPT | Performed by: ANESTHESIOLOGY

## 2024-04-02 PROCEDURE — 3074F SYST BP LT 130 MM HG: CPT | Performed by: ANESTHESIOLOGY

## 2024-04-02 PROCEDURE — 3078F DIAST BP <80 MM HG: CPT | Performed by: ANESTHESIOLOGY

## 2024-04-02 RX ORDER — TRIAMCINOLONE ACETONIDE 5 MG/G
CREAM TOPICAL
COMMUNITY
Start: 2024-02-27

## 2024-04-02 RX ORDER — LORAZEPAM 2 MG/1
TABLET ORAL
Qty: 1 TABLET | Refills: 0 | Status: SHIPPED | OUTPATIENT
Start: 2024-04-02

## 2024-04-02 ASSESSMENT — ENCOUNTER SYMPTOMS
HEMATOLOGIC/LYMPHATIC NEGATIVE: 1
DEPRESSION: 0
PSYCHIATRIC NEGATIVE: 1
GASTROINTESTINAL NEGATIVE: 1
ENDOCRINE NEGATIVE: 1
LOSS OF SENSATION IN FEET: 0
BACK PAIN: 1
MYALGIAS: 1
NEUROLOGICAL NEGATIVE: 1
CARDIOVASCULAR NEGATIVE: 1
OCCASIONAL FEELINGS OF UNSTEADINESS: 0
EYES NEGATIVE: 1
RESPIRATORY NEGATIVE: 1

## 2024-04-02 ASSESSMENT — LIFESTYLE VARIABLES
AUDIT-C TOTAL SCORE: 0
HOW MANY STANDARD DRINKS CONTAINING ALCOHOL DO YOU HAVE ON A TYPICAL DAY: PATIENT DOES NOT DRINK
HOW OFTEN DO YOU HAVE A DRINK CONTAINING ALCOHOL: NEVER
HOW OFTEN DO YOU HAVE SIX OR MORE DRINKS ON ONE OCCASION: NEVER
SKIP TO QUESTIONS 9-10: 1

## 2024-04-02 ASSESSMENT — PAIN SCALES - GENERAL
PAINLEVEL_OUTOF10: 4
PAINLEVEL: 4

## 2024-04-02 ASSESSMENT — PAIN - FUNCTIONAL ASSESSMENT: PAIN_FUNCTIONAL_ASSESSMENT: 0-10

## 2024-04-02 ASSESSMENT — PAIN DESCRIPTION - DESCRIPTORS: DESCRIPTORS: ACHING

## 2024-04-02 NOTE — PROGRESS NOTES
This is 63 y.o.  male with who has been treated for Lower back pain. Pain is better, after having the injection he states that he had 80 % pain relief still current date . The pain is described as achiness and is relieved by injections . with who is here for follow-up   Chief Complaint   Patient presents with    Follow-up     2/5/2024 caudal NYASIA        Pain Therapies: Injections         Unable to collected Urine specimen.

## 2024-04-17 VITALS
HEIGHT: 63 IN | OXYGEN SATURATION: 96 % | SYSTOLIC BLOOD PRESSURE: 123 MMHG | RESPIRATION RATE: 16 BRPM | DIASTOLIC BLOOD PRESSURE: 76 MMHG | WEIGHT: 123.24 LBS | TEMPERATURE: 97.3 F | BODY MASS INDEX: 21.84 KG/M2 | HEART RATE: 63 BPM

## 2024-04-18 ENCOUNTER — ANESTHESIA EVENT (OUTPATIENT)
Dept: OPERATING ROOM | Facility: HOSPITAL | Age: 64
End: 2024-04-18
Payer: MEDICARE

## 2024-04-18 ENCOUNTER — EVALUATION (OUTPATIENT)
Dept: PHYSICAL THERAPY | Facility: CLINIC | Age: 64
End: 2024-04-18
Payer: MEDICARE

## 2024-04-18 DIAGNOSIS — S32.050S COMPRESSION FRACTURE OF L5 VERTEBRA, SEQUELA: ICD-10-CM

## 2024-04-18 DIAGNOSIS — S32.049A CLOSED FRACTURE OF FOURTH LUMBAR VERTEBRA, UNSPECIFIED FRACTURE MORPHOLOGY, INITIAL ENCOUNTER (MULTI): ICD-10-CM

## 2024-04-18 DIAGNOSIS — M47.27 LUMBOSACRAL SPONDYLOSIS WITH RADICULOPATHY: ICD-10-CM

## 2024-04-18 PROCEDURE — 97161 PT EVAL LOW COMPLEX 20 MIN: CPT | Mod: GP

## 2024-04-18 ASSESSMENT — ENCOUNTER SYMPTOMS
LOSS OF SENSATION IN FEET: 1
OCCASIONAL FEELINGS OF UNSTEADINESS: 0
DEPRESSION: 0

## 2024-04-18 ASSESSMENT — ACTIVITIES OF DAILY LIVING (ADL): EFFECT OF PAIN ON DAILY ACTIVITIES: SEE GOALS

## 2024-04-18 ASSESSMENT — PAIN SCALES - GENERAL: PAINLEVEL_OUTOF10: 8

## 2024-04-18 ASSESSMENT — PAIN - FUNCTIONAL ASSESSMENT: PAIN_FUNCTIONAL_ASSESSMENT: 0-10

## 2024-04-18 NOTE — PROGRESS NOTES
Physical Therapy Evaluation and Treatment      Patient Name: Horacio Gupta  MRN: [id number]  Today's Date: 4/18/2024  Time Calculation  Start Time: 1222  Stop Time: 1300  Time Calculation (min): 38 min    PT Evaluation Time Entry  PT Evaluation (Low) Time Entry: 35                             Assessment:  Rehab Prognosis: Fair (chronic syndrome)  Barriers to Participation:  (none)  C/C sx: see pain section  REMI:    see pain section  ADL makes worse?:stdg/walking; prolonged postures  ADL makes better?:  Testing:3/28/23 films-1. Ankylosing spondylitis and multilevel spondylosis of the lumbar   spine with similar alignment 02/23/2023. Moderate central canal   stenosis at L4-L5. There is severe neural foraminal stenosis on the   left involving L3-L4 and L4-L5 as well as severe bilateral neural   foraminal stenosis at L5-S1.   2. Subacute fractures of L5 and the right transverse process of L4.         Physical Findings: Limited: AROM ( trunk )                               Strength ( abdominals )                                                POC:  Ongoing clinic PT to include:Per the patient he and the Dr discussed and prefer aquatic exercises.  This seems reasonable with the present DX and chronic spinal condition in view.  Continue with lumbar unloading and core stability exercise as domi.    Other: (copay?- no ) (fall risk?- low  ) (ins visit limit?- no  )   Plan:  Treatment/Interventions: Aquatic therapy, Self care/ home management, Therapeutic exercises  PT Plan: Skilled PT  PT Frequency: 2 times per week  Duration: 4wks  Onset Date: 04/18/24  Certification Period Start Date: 04/18/24  Certification Period End Date: 07/17/24  Rehab Potential: Fair (chronic syndrome)  Plan of Care Agreement: Patient    Current Problem:   1. Compression fracture of L5 vertebra, sequela  Referral to Physical Therapy    Follow Up In Physical Therapy      2. Closed fracture of fourth lumbar vertebra, unspecified fracture morphology,  initial encounter (Multi)  Referral to Physical Therapy    Follow Up In Physical Therapy      3. Lumbosacral spondylosis with radiculopathy  Referral to Physical Therapy    Follow Up In Physical Therapy          Subjective    General:  General  Reason for Referral: lumb comp FX; lumb spndylosis  Referred By: Coty  Past Medical History Relevant to Rehab: ankylosing spondylitis; per the patient the Dr wants pool therapy  General Comment: 1/9  Precautions:  Precautions  STEADI Fall Risk Score (The score of 4 or more indicates an increased risk of falling): 4  Precautions Comment: low fall risk; aortic aneurysm; B hip pain; v-tach; chronic respiratory failure; CAD; systolic; cerv stenosis; COPD; scolosis; cardiomyopathy; aortic regurg; pleurisy; RLE THR/ORIF(remote)    Pain:  Pain Assessment  Pain Assessment: 0-10  Pain Score: 8 (max sx)  Pain Type: Chronic pain  Pain Location: Back  Pain Orientation: Lower  Pain Radiating Towards: RLE sx to the ankle  Pain Onset: Ongoing  Clinical Progression:  (ankylosing spondylitis)  Effect of Pain on Daily Activities: see goals  Pain Interventions:  (injections; PT)    Prior Level of Function:  Prior Function Per Pt/Caregiver Report  Vocational: On disability    Objective     Outcome Measures:  Other Measures  Oswestry Disablity Index (ASH): 27     Treatments:eval only   Ongoing clinic PT to include:Per the patient he and the Dr discussed and prefer aquatic exercises.  This seems reasonable with the present DX and chronic spinal condition in view.  Continue with lumbar unloading and core stability exercise as domi.          Goals:  Active       PT Problem       PT Goal 1       Start:  04/18/24    Expected End:  07/17/24       1. Independent HEP to allow for 50% reduction in max ADL C/C sx ( 8/10) 2-3wks  2. 0/10 night time sx to allow for uninterrupted sleep x 1wk ( 7/7) 2-3wks  3. Survey score improvement from 54% to 40% (ASH) 3-4wks  4. Strength increase to allow for improved  "ADL stdg/walking (from 4- /5 to 4+/5 abdominals) 3-4wks         PT Goal 2       Start:  04/18/24    Expected End:  07/17/24       1. \"I want my back to feel better\".             Lumbar Spine    Observation  Observation:: marked fwd head/dowagers    Lumbar AROM  Lumbar flexion: (60°): 30%  Lumbar extension (25°): 5%  Lumbar sidebend right (25°): 20%  Lumbar sidebend left (25°): 20%    Lumbar Myotomes  Lumbar Myotomes WFL: yes                 "

## 2024-04-19 ENCOUNTER — TELEPHONE (OUTPATIENT)
Dept: GASTROENTEROLOGY | Facility: CLINIC | Age: 64
End: 2024-04-19

## 2024-04-19 ENCOUNTER — ANESTHESIA (OUTPATIENT)
Dept: OPERATING ROOM | Facility: HOSPITAL | Age: 64
End: 2024-04-19
Payer: MEDICARE

## 2024-04-19 ENCOUNTER — HOSPITAL ENCOUNTER (OUTPATIENT)
Dept: OPERATING ROOM | Facility: HOSPITAL | Age: 64
Setting detail: OUTPATIENT SURGERY
Discharge: HOME | End: 2024-04-19
Payer: MEDICARE

## 2024-04-19 VITALS
BODY MASS INDEX: 24.61 KG/M2 | WEIGHT: 138.89 LBS | DIASTOLIC BLOOD PRESSURE: 71 MMHG | HEIGHT: 63 IN | TEMPERATURE: 97.2 F | HEART RATE: 85 BPM | OXYGEN SATURATION: 91 % | RESPIRATION RATE: 20 BRPM | SYSTOLIC BLOOD PRESSURE: 129 MMHG

## 2024-04-19 DIAGNOSIS — K51.919 ULCERATIVE COLITIS WITH COMPLICATION, UNSPECIFIED LOCATION (MULTI): ICD-10-CM

## 2024-04-19 DIAGNOSIS — R10.13 EPIGASTRIC PAIN: ICD-10-CM

## 2024-04-19 DIAGNOSIS — K44.9 HIATAL HERNIA: ICD-10-CM

## 2024-04-19 PROCEDURE — 2500000005 HC RX 250 GENERAL PHARMACY W/O HCPCS: Performed by: ANESTHESIOLOGY

## 2024-04-19 PROCEDURE — 3600000007 HC OR TIME - EACH INCREMENTAL 1 MINUTE - PROCEDURE LEVEL TWO: Performed by: ANESTHESIOLOGY

## 2024-04-19 PROCEDURE — 43239 EGD BIOPSY SINGLE/MULTIPLE: CPT | Performed by: INTERNAL MEDICINE

## 2024-04-19 PROCEDURE — 3700000002 HC GENERAL ANESTHESIA TIME - EACH INCREMENTAL 1 MINUTE: Performed by: ANESTHESIOLOGY

## 2024-04-19 PROCEDURE — 7100000010 HC PHASE TWO TIME - EACH INCREMENTAL 1 MINUTE: Performed by: ANESTHESIOLOGY

## 2024-04-19 PROCEDURE — 2500000004 HC RX 250 GENERAL PHARMACY W/ HCPCS (ALT 636 FOR OP/ED): Performed by: ANESTHESIOLOGY

## 2024-04-19 PROCEDURE — 3600000002 HC OR TIME - INITIAL BASE CHARGE - PROCEDURE LEVEL TWO: Performed by: ANESTHESIOLOGY

## 2024-04-19 PROCEDURE — 45380 COLONOSCOPY AND BIOPSY: CPT | Performed by: INTERNAL MEDICINE

## 2024-04-19 PROCEDURE — 2500000004 HC RX 250 GENERAL PHARMACY W/ HCPCS (ALT 636 FOR OP/ED): Performed by: INTERNAL MEDICINE

## 2024-04-19 PROCEDURE — 88305 TISSUE EXAM BY PATHOLOGIST: CPT | Performed by: PATHOLOGY

## 2024-04-19 PROCEDURE — 7100000009 HC PHASE TWO TIME - INITIAL BASE CHARGE: Performed by: ANESTHESIOLOGY

## 2024-04-19 PROCEDURE — 3700000001 HC GENERAL ANESTHESIA TIME - INITIAL BASE CHARGE: Performed by: ANESTHESIOLOGY

## 2024-04-19 PROCEDURE — 88305 TISSUE EXAM BY PATHOLOGIST: CPT | Mod: TC,SUR,SAMLAB | Performed by: INTERNAL MEDICINE

## 2024-04-19 RX ORDER — ACETAMINOPHEN 325 MG/1
975 TABLET ORAL ONCE
Status: COMPLETED | OUTPATIENT
Start: 2024-04-19 | End: 2024-04-19

## 2024-04-19 RX ORDER — OXYCODONE HYDROCHLORIDE 5 MG/1
5 TABLET ORAL EVERY 4 HOURS PRN
Status: DISCONTINUED | OUTPATIENT
Start: 2024-04-19 | End: 2024-04-20 | Stop reason: HOSPADM

## 2024-04-19 RX ORDER — SODIUM CHLORIDE, SODIUM LACTATE, POTASSIUM CHLORIDE, CALCIUM CHLORIDE 600; 310; 30; 20 MG/100ML; MG/100ML; MG/100ML; MG/100ML
100 INJECTION, SOLUTION INTRAVENOUS CONTINUOUS
Status: DISCONTINUED | OUTPATIENT
Start: 2024-04-19 | End: 2024-04-20 | Stop reason: HOSPADM

## 2024-04-19 RX ORDER — SODIUM CHLORIDE, SODIUM LACTATE, POTASSIUM CHLORIDE, CALCIUM CHLORIDE 600; 310; 30; 20 MG/100ML; MG/100ML; MG/100ML; MG/100ML
50 INJECTION, SOLUTION INTRAVENOUS CONTINUOUS
Status: DISCONTINUED | OUTPATIENT
Start: 2024-04-19 | End: 2024-04-20 | Stop reason: HOSPADM

## 2024-04-19 RX ORDER — ONDANSETRON HYDROCHLORIDE 2 MG/ML
4 INJECTION, SOLUTION INTRAVENOUS ONCE AS NEEDED
Status: DISCONTINUED | OUTPATIENT
Start: 2024-04-19 | End: 2024-04-20 | Stop reason: HOSPADM

## 2024-04-19 RX ORDER — ONDANSETRON HYDROCHLORIDE 2 MG/ML
4 INJECTION, SOLUTION INTRAVENOUS ONCE
Status: COMPLETED | OUTPATIENT
Start: 2024-04-19 | End: 2024-04-19

## 2024-04-19 RX ORDER — MORPHINE SULFATE 4 MG/ML
2 INJECTION, SOLUTION INTRAMUSCULAR; INTRAVENOUS EVERY 5 MIN PRN
Status: DISCONTINUED | OUTPATIENT
Start: 2024-04-19 | End: 2024-04-20 | Stop reason: HOSPADM

## 2024-04-19 RX ORDER — PROPOFOL 10 MG/ML
INJECTION, EMULSION INTRAVENOUS AS NEEDED
Status: DISCONTINUED | OUTPATIENT
Start: 2024-04-19 | End: 2024-04-19

## 2024-04-19 RX ORDER — LABETALOL HYDROCHLORIDE 5 MG/ML
5 INJECTION, SOLUTION INTRAVENOUS ONCE AS NEEDED
Status: DISCONTINUED | OUTPATIENT
Start: 2024-04-19 | End: 2024-04-20 | Stop reason: HOSPADM

## 2024-04-19 RX ORDER — NORETHINDRONE AND ETHINYL ESTRADIOL 0.5-0.035
KIT ORAL AS NEEDED
Status: DISCONTINUED | OUTPATIENT
Start: 2024-04-19 | End: 2024-04-19

## 2024-04-19 RX ORDER — SODIUM CHLORIDE, SODIUM LACTATE, POTASSIUM CHLORIDE, CALCIUM CHLORIDE 600; 310; 30; 20 MG/100ML; MG/100ML; MG/100ML; MG/100ML
20 INJECTION, SOLUTION INTRAVENOUS CONTINUOUS
Status: DISCONTINUED | OUTPATIENT
Start: 2024-04-19 | End: 2024-04-20 | Stop reason: HOSPADM

## 2024-04-19 RX ORDER — MORPHINE SULFATE 4 MG/ML
4 INJECTION, SOLUTION INTRAMUSCULAR; INTRAVENOUS EVERY 5 MIN PRN
Status: DISCONTINUED | OUTPATIENT
Start: 2024-04-19 | End: 2024-04-20 | Stop reason: HOSPADM

## 2024-04-19 RX ADMIN — ACETAMINOPHEN 975 MG: 325 TABLET ORAL at 07:30

## 2024-04-19 RX ADMIN — ONDANSETRON 4 MG: 2 INJECTION INTRAMUSCULAR; INTRAVENOUS at 07:30

## 2024-04-19 RX ADMIN — EPHEDRINE SULFATE 20 MG: 50 INJECTION, SOLUTION INTRAVENOUS at 08:25

## 2024-04-19 RX ADMIN — Medication 25 MG: at 08:20

## 2024-04-19 RX ADMIN — PROPOFOL 100 MG: 10 INJECTION, EMULSION INTRAVENOUS at 08:20

## 2024-04-19 RX ADMIN — SODIUM CHLORIDE, POTASSIUM CHLORIDE, SODIUM LACTATE AND CALCIUM CHLORIDE 20 ML/HR: 600; 310; 30; 20 INJECTION, SOLUTION INTRAVENOUS at 07:10

## 2024-04-19 RX ADMIN — EPHEDRINE SULFATE 25 MG: 50 INJECTION, SOLUTION INTRAVENOUS at 08:35

## 2024-04-19 ASSESSMENT — PAIN SCALES - GENERAL
PAINLEVEL_OUTOF10: 0 - NO PAIN
PAINLEVEL_OUTOF10: 5 - MODERATE PAIN
PAINLEVEL_OUTOF10: 0 - NO PAIN

## 2024-04-19 ASSESSMENT — COPD QUESTIONNAIRES: COPD: 1

## 2024-04-19 ASSESSMENT — PAIN DESCRIPTION - DESCRIPTORS: DESCRIPTORS: ACHING;DISCOMFORT

## 2024-04-19 ASSESSMENT — ENCOUNTER SYMPTOMS: SHORTNESS OF BREATH: 1

## 2024-04-19 ASSESSMENT — PAIN - FUNCTIONAL ASSESSMENT
PAIN_FUNCTIONAL_ASSESSMENT: 0-10
PAIN_FUNCTIONAL_ASSESSMENT: 0-10

## 2024-04-19 NOTE — ANESTHESIA PREPROCEDURE EVALUATION
Patient: Horacio Gupta    Procedure Information       Date/Time: 04/19/24 0800    Scheduled providers: Clif Beckett MD; Christophe Longoria MD    Procedures:       EGD      COLONOSCOPY    Location: [facility] OR            Relevant Problems   Cardiac   (+) Aortic aneurysm (CMS-HCC)   (+) CAD (coronary artery disease)   (+) HTN (hypertension), benign   (+) Hypercholesterolemia   (+) Moderate aortic regurgitation   (+) Ventricular tachycardia (Multi)      Pulmonary   (+) COPD exacerbation (Multi)   (+) Chronic obstructive pulmonary disease (Multi)   (+) Obstructive sleep apnea   (+) SOB (shortness of breath) on exertion      Neuro   (+) Lumbosacral spondylosis with radiculopathy      GI   (+) GERD (gastroesophageal reflux disease)   (+) Hiatal hernia   (+) Ulcerative colitis (Multi)      Hematology   (+) Thrombocytopenia, unspecified (CMS-HCC)      Musculoskeletal   (+) Cervical spondylosis   (+) Degenerative cervical spinal stenosis   (+) Idiopathic scoliosis in adult patient   (+) Lumbosacral spondylosis with radiculopathy      HEENT   (+) Acute non-recurrent sinusitis   (+) Sinus congestion       Clinical information reviewed:   Tobacco  Allergies    Med Hx  Surg Hx   Fam Hx  Soc Hx        No data recorded     Physical Exam    Airway  Mallampati: II  TM distance: >3 FB  Neck ROM: full     Cardiovascular   Rhythm: regular     Dental    Pulmonary    Abdominal        Anesthesia Plan    History of general anesthesia?: yes  History of complications of general anesthesia?: no    ASA 3     intravenous induction   Anesthetic plan and risks discussed with patient.     Anesthesia Evaluation      History of anesthetic complications   Airway   Mallampati: II  TM distance: >3 FB  Neck ROM: full  Dental      Pulmonary    (+) COPD, shortness of breath, sleep apnea  Cardiovascular   (+) hypertension, CAD    Rhythm: regular    Neuro/Psych - negative ROS     GI/Hepatic/Renal    (+) hiatal hernia,  GERD    Endo/Other    Abdominal

## 2024-04-19 NOTE — DISCHARGE INSTRUCTIONS
Patient Instructions after a Colonoscopy      The anesthetics, sedatives or narcotics which were given to you today will be acting in your body for the next 24 hours, so you might feel a little sleepy or groggy.  This feeling should slowly wear off. Carefully read and follow the instructions.     You received sedation today:  - Do not drive or operate any machinery or power tools of any kind.   - No alcoholic beverages today, not even beer or wine.  - Do not make any important decisions or sign any legal documents.  - No over the counter medications that contain alcohol or that may cause drowsiness.  - Do not make any important decisions or sign any legal documents.    While it is common to experience mild to moderate abdominal distention, gas, or belching after your procedure, if any of these symptoms occur following discharge from the GI Lab or within one week of having your procedure, call the [facility] to be advised whether a visit to your nearest Urgent Care or Emergency Department is indicated.  Take this paper with you if you go.     - If you develop an allergic reaction to the medications that were given during your procedure such as difficulty breathing, rash, hives, severe nausea, vomiting or lightheadedness.  - If you experience chest pain, shortness of breath, severe abdominal pain, fevers and chills.  -If you develop signs and symptoms of bleeding such as blood in your spit, if your stools turn black, tarry, or bloody  - If you have not urinated within 8 hours following your procedure.  - If your IV site becomes painful, red, inflamed, or looks infected.    If you received a biopsy/polypectomy/sphincterotomy the following instructions apply below:    __ Do not use Aspirin containing products, non-steroidal medications or anti-coagulants for one week following your procedure. (Examples of these types of medications are: Advil, Arthrotec, Aleve, Coumadin, Ecotrin, Heparin, Ibuprofen,  Indocin, Motrin, Naprosyn, Nuprin, Plavix, Vioxx, and Voltarin, or their generic forms.  This list is not all-inclusive.  Check with your physician or pharmacist before resuming medications.)   __ Eat a soft diet today.  Avoid foods that are poorly digested for the next 24 hours.  These foods would include: nuts, beans, lettuce, red meats, and fried foods. Start with liquids and advance your diet as tolerated, gradually work up to eating solids.   __ Do not have a Barium Study or Enema for one week.    Your physician recommends the additional following instructions:    -You have a contact number available for emergencies. The signs and symptoms of potential delayed complications were discussed with you. You may return to normal activities tomorrow.  -Resume your previous diet.  -Continue your present medications.   -We are waiting for your pathology results.  -Your physician has recommended a repeat colonoscopy (date to be determined after pending pathology results are reviewed) for surveillance based on pathology results.  -The findings and recommendations have been discussed with you.  -The findings and recommendations were discussed with your family.  - Please see Medication Reconciliation Form for new medication/medications prescribed.       If you experience any problems or have any questions following discharge from the GI Lab, please call:        Nurse Signature                                                                        Date___________________                                                                            Patient/Responsible Party Signature                                        Date___________________

## 2024-04-19 NOTE — ANESTHESIA POSTPROCEDURE EVALUATION
Patient: Horacio Gupta    Procedure Summary       Date: 04/19/24 Room / Location: [facility] OR    Anesthesia Start: 0811 Anesthesia Stop: 0851    Procedures:       EGD      COLONOSCOPY Diagnosis:       Epigastric pain      Hiatal hernia      Ulcerative colitis with complication, unspecified location (Multi)    Scheduled Providers: Clif Beckett MD; Christophe Longoria MD Responsible Provider: Christophe Longoria MD    Anesthesia Type: general ASA Status: 3            Anesthesia Type: general    Vitals Value Taken Time   /71 04/19/24 0915   Temp 36.2 °C (97.2 °F) 04/19/24 0847   Pulse 85 04/19/24 0915   Resp 20 04/19/24 0915   SpO2 91 % 04/19/24 0930       Anesthesia Post Evaluation    Patient location during evaluation: bedside  Patient participation: complete - patient participated  Level of consciousness: sleepy but conscious  Pain management: adequate  Airway patency: patent  Cardiovascular status: acceptable  Respiratory status: acceptable  Hydration status: acceptable  Postoperative Nausea and Vomiting: none    No notable events documented.

## 2024-04-19 NOTE — ANESTHESIA PROCEDURE NOTES
Airway  Date/Time: 4/19/2024 8:20 AM  Urgency: elective      Staffing  Performed: ALMITA   Authorized by: Christophe Longoria MD    Performed by: Christophe Longoria MD  Patient location during procedure: OR    Indications and Patient Condition  Indications for airway management: anesthesia      Final Airway Details  Final airway type: supraglottic airway      Successful airway: Size 5

## 2024-04-19 NOTE — H&P
Pre procedure H&P  Pt feels fine , no complaint  General appearance: Comfortable, no distress  ROS: No SOB  Medications reviewed  Head: Normal  Neck: Soft  Heart: Regular  Lungs: Clear  Abdomen: soft    Impression: clinically doing fine, proceed with procedure    Problem List Items Addressed This Visit       Abdominal pain    Relevant Orders    EGD    Colonoscopy Diagnostic    Hiatal hernia    Relevant Orders    EGD    Ulcerative colitis (Multi)    Relevant Orders    Colonoscopy Diagnostic

## 2024-04-22 ENCOUNTER — TREATMENT (OUTPATIENT)
Dept: PHYSICAL THERAPY | Facility: CLINIC | Age: 64
End: 2024-04-22
Payer: MEDICARE

## 2024-04-22 ENCOUNTER — OFFICE VISIT (OUTPATIENT)
Dept: PRIMARY CARE | Facility: CLINIC | Age: 64
End: 2024-04-22
Payer: MEDICARE

## 2024-04-22 VITALS
DIASTOLIC BLOOD PRESSURE: 75 MMHG | HEIGHT: 63 IN | SYSTOLIC BLOOD PRESSURE: 124 MMHG | WEIGHT: 145 LBS | BODY MASS INDEX: 25.69 KG/M2 | HEART RATE: 71 BPM

## 2024-04-22 DIAGNOSIS — J44.9 CHRONIC OBSTRUCTIVE PULMONARY DISEASE, UNSPECIFIED COPD TYPE (MULTI): ICD-10-CM

## 2024-04-22 DIAGNOSIS — K86.9 PANCREATIC LESION (HHS-HCC): Primary | ICD-10-CM

## 2024-04-22 DIAGNOSIS — K51.90 ULCERATIVE COLITIS WITHOUT COMPLICATIONS, UNSPECIFIED LOCATION (MULTI): ICD-10-CM

## 2024-04-22 DIAGNOSIS — S32.050S COMPRESSION FRACTURE OF L5 VERTEBRA, SEQUELA: ICD-10-CM

## 2024-04-22 DIAGNOSIS — S32.049A CLOSED FRACTURE OF FOURTH LUMBAR VERTEBRA, UNSPECIFIED FRACTURE MORPHOLOGY, INITIAL ENCOUNTER (MULTI): ICD-10-CM

## 2024-04-22 DIAGNOSIS — M47.27 LUMBOSACRAL SPONDYLOSIS WITH RADICULOPATHY: ICD-10-CM

## 2024-04-22 PROCEDURE — 3074F SYST BP LT 130 MM HG: CPT | Performed by: INTERNAL MEDICINE

## 2024-04-22 PROCEDURE — 4004F PT TOBACCO SCREEN RCVD TLK: CPT | Performed by: INTERNAL MEDICINE

## 2024-04-22 PROCEDURE — 3078F DIAST BP <80 MM HG: CPT | Performed by: INTERNAL MEDICINE

## 2024-04-22 PROCEDURE — 97113 AQUATIC THERAPY/EXERCISES: CPT | Mod: GP,CQ | Performed by: PHYSICAL THERAPY ASSISTANT

## 2024-04-22 PROCEDURE — 99214 OFFICE O/P EST MOD 30 MIN: CPT | Performed by: INTERNAL MEDICINE

## 2024-04-22 ASSESSMENT — ENCOUNTER SYMPTOMS
BACK PAIN: 0
ABDOMINAL DISTENTION: 0
PALPITATIONS: 0
CONFUSION: 0
FEVER: 0
SHORTNESS OF BREATH: 0
DIARRHEA: 0
EYE DISCHARGE: 0
NAUSEA: 0
AGITATION: 0
NECK STIFFNESS: 0
ABDOMINAL PAIN: 0
COUGH: 0
HEMATOLOGIC/LYMPHATIC NEGATIVE: 1
VOMITING: 0
ALLERGIC/IMMUNOLOGIC NEGATIVE: 1
CONSTITUTIONAL NEGATIVE: 1
RESPIRATORY NEGATIVE: 1
EYES NEGATIVE: 1
ENDOCRINE NEGATIVE: 1
GASTROINTESTINAL NEGATIVE: 1
ADENOPATHY: 0
NEUROLOGICAL NEGATIVE: 1
HEADACHES: 0
LIGHT-HEADEDNESS: 0
JOINT SWELLING: 0
CARDIOVASCULAR NEGATIVE: 1
CHILLS: 0
CONSTIPATION: 0
MUSCULOSKELETAL NEGATIVE: 1
WHEEZING: 0
DYSURIA: 0
NUMBNESS: 0
PSYCHIATRIC NEGATIVE: 1

## 2024-04-22 ASSESSMENT — PAIN SCALES - GENERAL: PAINLEVEL_OUTOF10: 7

## 2024-04-22 ASSESSMENT — PAIN - FUNCTIONAL ASSESSMENT: PAIN_FUNCTIONAL_ASSESSMENT: 0-10

## 2024-04-22 ASSESSMENT — ACTIVITIES OF DAILY LIVING (ADL): EFFECT OF PAIN ON DAILY ACTIVITIES: SEE GOALS

## 2024-04-22 NOTE — PROGRESS NOTES
Subjective   Patient ID: Horacio Gupta is a 63 y.o. male who presents for Follow-up (Pt here fu egd , colonoscopy, and ct scan).  Here for fu after scopes feels fine        Review of Systems   Constitutional: Negative.  Negative for chills and fever.   HENT: Negative.  Negative for congestion.    Eyes: Negative.  Negative for discharge.   Respiratory: Negative.  Negative for cough, shortness of breath and wheezing.    Cardiovascular: Negative.  Negative for chest pain, palpitations and leg swelling.   Gastrointestinal: Negative.  Negative for abdominal distention, abdominal pain, constipation, diarrhea, nausea and vomiting.   Endocrine: Negative.    Genitourinary: Negative.  Negative for dysuria and urgency.   Musculoskeletal: Negative.  Negative for back pain, joint swelling and neck stiffness.   Skin: Negative.  Negative for rash.   Allergic/Immunologic: Negative.  Negative for immunocompromised state.   Neurological: Negative.  Negative for light-headedness, numbness and headaches.   Hematological: Negative.  Negative for adenopathy.   Psychiatric/Behavioral: Negative.  Negative for agitation, behavioral problems and confusion.    All other systems reviewed and are negative.      Objective   Physical Exam  Vitals reviewed.   Constitutional:       General: He is not in acute distress.     Appearance: Normal appearance.   HENT:      Head: Normocephalic and atraumatic.      Nose: Nose normal.   Eyes:      Conjunctiva/sclera: Conjunctivae normal.      Pupils: Pupils are equal, round, and reactive to light.   Neck:      Vascular: No carotid bruit.   Cardiovascular:      Rate and Rhythm: Normal rate and regular rhythm.      Pulses: Normal pulses.      Heart sounds:      No gallop.   Pulmonary:      Effort: Pulmonary effort is normal. No respiratory distress.      Breath sounds: Normal breath sounds. No wheezing.   Abdominal:      General: Bowel sounds are normal.      Palpations: Abdomen is soft.       "Tenderness: There is no abdominal tenderness.   Musculoskeletal:         General: Normal range of motion.      Cervical back: Normal range of motion. No rigidity.   Lymphadenopathy:      Cervical: No cervical adenopathy.   Skin:     General: Skin is warm.      Findings: No rash.   Neurological:      General: No focal deficit present.      Mental Status: He is alert and oriented to person, place, and time.   Psychiatric:         Mood and Affect: Mood normal.         Behavior: Behavior normal.       /75 (BP Location: Right arm, Patient Position: Sitting)   Pulse 71   Ht 1.6 m (5' 3\")   Wt 65.8 kg (145 lb)   BMI 25.69 kg/m²    PSA   Date/Time Value Ref Range Status   01/31/2022 06:55 AM 1.29 0.00 - 4.00 ng/mL Final     Comment:     The FDA requires that the method used for PSA assay be   reported to the physician. Values obtained with different   assay methods must not be used interchangeably. This test  was performed at [facility] using the Access   Hybritech PSA assay is a two-site immunoenzymatic sandwich   assay. The assay is approved for measurement of   prostate-specific antigen (PSA)in serum and may be used   in conjunction with a digital rectal examination in men   50 years and older as an aid in detection of prostate   cancer.  5-Alpha-reductase inhibitors (e.g. Proscar, Finasteride,   Avodart, Dutasteride and Zahira) for the treatment of BPH   have been shown to lower PSA levels by an average of 50%   after 6 months of treatment.       Hemoglobin A1C   Date/Time Value Ref Range Status   02/12/2024 06:51 AM 5.9 (H) see below % Final     Assessment/Plan   Problem List Items Addressed This Visit       Chronic obstructive pulmonary disease (Multi)    Ulcerative colitis (Multi)     Other Visit Diagnoses       Pancreatic lesion (HHS-HCC)    -  Primary    Relevant Orders    MRCP pancreas w and wo IV contrast           Scope and ct dw pt    PATH PENDING    [facility] IN REMISSION    COPD IS " CLINICALLY STABLE, CONTINUE SAME        MDM    1) COMPLEXITY: 1 UNDIAGNOSED NEW PROBLEM WITH UNCERTAIN PROGNOSIS  2)DATA: TESTS INTERPRETED AND OR ORDERED, TOOK INDEPENDENT HISTORY OR RECORDS REVIEWED  3)RISK: MODERATE RISK DUE TO NATURE OF MEDICAL CONDITIONS/COMORBIDITY OR MEDICATIONS ORDERED OR SURGICAL OR PROCEDURE REFERRAL, .    Fu 3 week( MRCP fu)

## 2024-04-22 NOTE — PROGRESS NOTES
"Physical Therapy    Physical Therapy Treatment    Patient Name: Horacio Gupta  MRN: [id number]  Today's Date: 2024  Time Calculation  Start Time: 1430  Stop Time: 1515  Time Calculation (min): 45 min  PT Therapeutic Procedures Time Entry  Aquatic Therapy Time Entry: 42                   Current Problem  Problem List Items Addressed This Visit             ICD-10-CM       Neuro    Compression fracture of L5 vertebra (Multi) S32.050A    L4 vertebral fracture (Multi) S32.049A    Lumbosacral spondylosis with radiculopathy M47.27        General  Reason for Referral: lumba comp FX  Referred By: Coty  Past Medical History Relevant to Rehab: ankyosing spo  General Comment:     Subjective  Patient reports no falls and states 7/10 low back pain.    Precautions  Precautions  Precautions Comment: low risk    Pain  Pain Assessment: 0-10  Pain Score: 7  Pain Type: Chronic pain  Pain Location: Back  Pain Orientation: Lower  Pain Radiating Towards: R LE sx  Pain Onset: Ongoing  Effect of Pain on Daily Activities: see goals    Objective   SIDE STEPPING x 3laps  Heel raises x 12  Squats x 12  Hip Abd/add x 12  Hip Flexion x 12  Alt. March Steps x 12  DB ROLLS [place] 30\" ea. Dir.  ABDOMINALS sm blue board x 12 ea. Flexion, push/pull  UE PADDLES  Open  x 20 ea.   Flex/ext, abd/add, H. Abd/add, push/pull  100% UNLOADING 1 noodle  Bike 5'  Hip Abd/add 5'  Hang 5'  GRADUAL EXIT 3'      Treatments:    OP Education      Assessment  patient orientated to pool.  Patient has good TrA and keeps intact with there-ex.   Patient needing one UE support with LE there-ex and with 100% unloading.  Patient has good form/understanding with there-ex and keeps body in good alignment.  Continue with core stability while performing dyn activity to decrease back pain.  Patient verified by name and .    Plan  Continue with core stability to improve sleep, standing, ambulation.       Active       PT Problem       PT Goal 1       Start:  " "04/18/24    Expected End:  07/17/24       1. Independent HEP to allow for 50% reduction in max ADL C/C sx ( 8/10) 2-3wks  2. 0/10 night time sx to allow for uninterrupted sleep x 1wk ( 7/7) 2-3wks  3. Survey score improvement from 54% to 40% (ASH) 3-4wks  4. Strength increase to allow for improved ADL stdg/walking (from 4- /5 to 4+/5 abdominals) 3-4wks         PT Goal 2       Start:  04/18/24    Expected End:  07/17/24       1. \"I want my back to feel better\".             "

## 2024-04-24 LAB
LABORATORY COMMENT REPORT: NORMAL
PATH REPORT.FINAL DX SPEC: NORMAL
PATH REPORT.GROSS SPEC: NORMAL
PATH REPORT.TOTAL CANCER: NORMAL

## 2024-04-26 ENCOUNTER — TREATMENT (OUTPATIENT)
Dept: PHYSICAL THERAPY | Facility: CLINIC | Age: 64
End: 2024-04-26
Payer: MEDICARE

## 2024-04-26 DIAGNOSIS — S32.049A CLOSED FRACTURE OF FOURTH LUMBAR VERTEBRA, UNSPECIFIED FRACTURE MORPHOLOGY, INITIAL ENCOUNTER (MULTI): ICD-10-CM

## 2024-04-26 DIAGNOSIS — S32.050S COMPRESSION FRACTURE OF L5 VERTEBRA, SEQUELA: ICD-10-CM

## 2024-04-26 DIAGNOSIS — M47.27 LUMBOSACRAL SPONDYLOSIS WITH RADICULOPATHY: ICD-10-CM

## 2024-04-26 PROCEDURE — 97113 AQUATIC THERAPY/EXERCISES: CPT | Mod: GP,CQ | Performed by: PHYSICAL THERAPY ASSISTANT

## 2024-04-26 ASSESSMENT — PAIN - FUNCTIONAL ASSESSMENT: PAIN_FUNCTIONAL_ASSESSMENT: 0-10

## 2024-04-26 ASSESSMENT — ACTIVITIES OF DAILY LIVING (ADL): EFFECT OF PAIN ON DAILY ACTIVITIES: SEE GOALS

## 2024-04-26 ASSESSMENT — PAIN SCALES - GENERAL: PAINLEVEL_OUTOF10: 8

## 2024-04-26 NOTE — PROGRESS NOTES
"Physical Therapy    Physical Therapy Treatment    Patient Name: Horacio Gupta  MRN: [id number]  Today's Date: 2024  Time Calculation  Start Time: 1130  Stop Time: 1215  Time Calculation (min): 45 min  PT Therapeutic Procedures Time Entry  Aquatic Therapy Time Entry: 41                   Current Problem  Problem List Items Addressed This Visit             ICD-10-CM       Neuro    Compression fracture of L5 vertebra (Multi) S32.050A    L4 vertebral fracture (Multi) S32.049A    Lumbosacral spondylosis with radiculopathy M47.27        General  Reason for Referral: lumba comp FX  Referred By: Coty  Past Medical History Relevant to Rehab: ankyosing spo  General Comment: 3/9    Subjective   Patient reports no falls and states low back pain of 8/10 today.  Reports doing yard work/mowing yesterday.    Precautions  Precautions  Precautions Comment: low risk    Pain  Pain Assessment: 0-10  Pain Score: 8  Pain Type: Chronic pain  Pain Location: Back  Pain Orientation: Lower  Pain Radiating Towards: R LE  Pain Onset: Ongoing  Effect of Pain on Daily Activities: see goals    Objective   SIDE STEPPING x 3laps  Heel raises x 12  Squats x 12  Hip Abd/add x 12  Hip Flexion x 12  Alt. March Steps x 12  DB ROLLS [place] 30\" ea. Dir.  ABDOMINALS sm blue board x 12 ea. Flexion, push/pull  UE PADDLES  Open  x 20 ea.   Flex/ext, abd/add, H. Abd/add, push/pull  100% UNLOADING 1 noodle  Bike 5'  Hip Abd/add 5'  Hang 5'  GRADUAL EXIT 3'      Treatments:    OP Education      Assessment    Patient has good TrA and keeps intact with there-ex.   Patient needing one UE support with LE there-ex and with 100% unloading.  Patient has good form/understanding with there-ex and keeps body in good alignment.  Continue with core stability while performing dyn activity to decrease back pain.  Patient verified by name and .    Plan  Continue with core stability to improve sleeping, standing, ambulation.    Treatments:    OP " "Education      Assessment  Patient verified by name and .    Plan       Active       PT Problem       PT Goal 1       Start:  24    Expected End:  24       1. Independent HEP to allow for 50% reduction in max ADL C/C sx ( 8/10) 2-3wks  2. 0/10 night time sx to allow for uninterrupted sleep x 1wk ( ) 2-3wks  3. Survey score improvement from 54% to 40% (ASH) 3-4wks  4. Strength increase to allow for improved ADL stdg/walking (from 4- /5 to 4+/5 abdominals) 3-4wks         PT Goal 2       Start:  24    Expected End:  24       1. \"I want my back to feel better\".             "

## 2024-04-29 ENCOUNTER — TREATMENT (OUTPATIENT)
Dept: PHYSICAL THERAPY | Facility: CLINIC | Age: 64
End: 2024-04-29
Payer: MEDICARE

## 2024-04-29 DIAGNOSIS — S32.050S COMPRESSION FRACTURE OF L5 VERTEBRA, SEQUELA: ICD-10-CM

## 2024-04-29 DIAGNOSIS — S32.049A CLOSED FRACTURE OF FOURTH LUMBAR VERTEBRA, UNSPECIFIED FRACTURE MORPHOLOGY, INITIAL ENCOUNTER (MULTI): ICD-10-CM

## 2024-04-29 DIAGNOSIS — M47.27 LUMBOSACRAL SPONDYLOSIS WITH RADICULOPATHY: ICD-10-CM

## 2024-04-29 PROCEDURE — 97113 AQUATIC THERAPY/EXERCISES: CPT | Mod: GP,CQ | Performed by: PHYSICAL THERAPY ASSISTANT

## 2024-04-29 ASSESSMENT — ACTIVITIES OF DAILY LIVING (ADL): EFFECT OF PAIN ON DAILY ACTIVITIES: SEE GOALS

## 2024-04-29 ASSESSMENT — PAIN SCALES - GENERAL: PAINLEVEL_OUTOF10: 8

## 2024-04-29 ASSESSMENT — PAIN - FUNCTIONAL ASSESSMENT: PAIN_FUNCTIONAL_ASSESSMENT: 0-10

## 2024-04-29 NOTE — PROGRESS NOTES
Physical Therapy    Physical Therapy Treatment    Patient Name: Horacio Gupta  MRN: [id number]  Today's Date: 4/29/2024  Time Calculation  Start Time: 1430  Stop Time: 1515  Time Calculation (min): 45 min  PT Therapeutic Procedures Time Entry  Aquatic Therapy Time Entry: 41                   Current Problem  Problem List Items Addressed This Visit             ICD-10-CM       Neuro    Compression fracture of L5 vertebra (Multi) S32.050A    L4 vertebral fracture (Multi) S32.049A    Lumbosacral spondylosis with radiculopathy M47.27        General  Reason for Referral: lumba comp FX  Referred By: Coty  Past Medical History Relevant to Rehab: [facility]  General Comment: VISIT 4/9    Subjective   Patient reports no galls and states 8/10 low back pain.  Patient reports having pancrease issues.     Precautions  Precautions  Precautions Comment: low risk    Pain  Pain Assessment: 0-10  Pain Score: 8  Pain Type: Chronic pain  Pain Location: Back  Pain Orientation: Lower  Pain Radiating Towards: R LE  Pain Onset: Ongoing  Effect of Pain on Daily Activities: see goals    Objective     Physical Therapy    Physical Therapy Treatment    Patient Name: Horacio Gupta  MRN: [id number]  Today's Date: 4/22/2024  Time Calculation  Start Time: 1430  Stop Time: 1515  Time Calculation (min): 45 min  PT Therapeutic Procedures Time Entry  Aquatic Therapy Time Entry: 42                   Current Problem  Problem List Items Addressed This Visit             ICD-10-CM       Neuro    Compression fracture of L5 vertebra (Multi) S32.050A    L4 vertebral fracture (Multi) S32.049A    Lumbosacral spondylosis with radiculopathy M47.27        General  Reason for Referral: lumba comp FX  Referred By: Coty  Past Medical History Relevant to Rehab: [facility]  General Comment: 2/9    Subjective  Patient reports no falls and states 7/10 low back pain.    Precautions  Precautions  Precautions Comment: low risk    Pain  Pain Assessment:  "0-10  Pain Score: 7  Pain Type: Chronic pain  Pain Location: Back  Pain Orientation: Lower  Pain Radiating Towards: R LE sx  Pain Onset: Ongoing  Effect of Pain on Daily Activities: see goals    Objective   SIDE STEPPING x 3laps  Heel raises x 15  Squats x 15  Hip Abd/add x 15  Hip Flexion x 15  Alt. March Steps x 15  DB ROLLS [place] 30\" ea. Dir.  ABDOMINALS sm blue board x 15 ea. Flexion, push/pull  UE PADDLES  Open  x 20 ea.   Flex/ext, abd/add, H. Abd/add, push/pull  100% UNLOADING 1 noodle  Bike 5'  Hip Abd/add 5'  Hang 5'  GRADUAL EXIT 3'      Treatments:    OP Education      Assessment   Patient has good TrA and keeps intact with there-ex.   Patient needing one UE support with LE there-ex and with 100% unloading.  Patient has good form/understanding with there-ex and keeps body in good alignment.  Continue with core stability while performing dyn activity to decrease back pain.  Patient verified by name and .    Plan  Continue with core stability to improve sleep, standing, ambulation.       Active       PT Problem       PT Goal 1       Start:  24    Expected End:  24       1. Independent HEP to allow for 50% reduction in max ADL C/C sx ( 8/10) 2-3wks  2. 0/10 night time sx to allow for uninterrupted sleep x 1wk ( ) 2-3wks  3. Survey score improvement from 54% to 40% (ASH) 3-4wks  4. Strength increase to allow for improved ADL stdg/walking (from 4- /5 to 4+/5 abdominals) 3-4wks         PT Goal 2       Start:  24    Expected End:  24       1. \"I want my back to feel better\".           Treatments:    OP Education      Assessment  Patient verified by name and .    Plan       Active       PT Problem       PT Goal 1       Start:  24    Expected End:  24       1. Independent HEP to allow for 50% reduction in max ADL C/C sx ( 8/10) 2-3wks  2. 0/10 night time sx to allow for uninterrupted sleep x 1wk ( ) 2-3wks  3. Survey score improvement from 54% to 40% (ASH) " "3-4wks  4. Strength increase to allow for improved ADL stdg/walking (from 4- /5 to 4+/5 abdominals) 3-4wks         PT Goal 2       Start:  04/18/24    Expected End:  07/17/24       1. \"I want my back to feel better\".             "

## 2024-04-30 ENCOUNTER — TELEPHONE (OUTPATIENT)
Dept: PRIMARY CARE | Facility: CLINIC | Age: 64
End: 2024-04-30
Payer: MEDICARE

## 2024-04-30 DIAGNOSIS — Z95.810 S/P ICD (INTERNAL CARDIAC DEFIBRILLATOR) PROCEDURE: Primary | ICD-10-CM

## 2024-05-02 ENCOUNTER — HOSPITAL ENCOUNTER (OUTPATIENT)
Dept: RADIOLOGY | Facility: HOSPITAL | Age: 64
Discharge: HOME | End: 2024-05-02
Payer: MEDICARE

## 2024-05-02 DIAGNOSIS — Z95.810 S/P ICD (INTERNAL CARDIAC DEFIBRILLATOR) PROCEDURE: ICD-10-CM

## 2024-05-02 PROCEDURE — 71046 X-RAY EXAM CHEST 2 VIEWS: CPT

## 2024-05-02 PROCEDURE — 71046 X-RAY EXAM CHEST 2 VIEWS: CPT | Performed by: RADIOLOGY

## 2024-05-03 ENCOUNTER — TREATMENT (OUTPATIENT)
Dept: PHYSICAL THERAPY | Facility: CLINIC | Age: 64
End: 2024-05-03
Payer: MEDICARE

## 2024-05-03 DIAGNOSIS — S32.050S COMPRESSION FRACTURE OF L5 VERTEBRA, SEQUELA: ICD-10-CM

## 2024-05-03 DIAGNOSIS — S32.049A CLOSED FRACTURE OF FOURTH LUMBAR VERTEBRA, UNSPECIFIED FRACTURE MORPHOLOGY, INITIAL ENCOUNTER (MULTI): ICD-10-CM

## 2024-05-03 DIAGNOSIS — M47.27 LUMBOSACRAL SPONDYLOSIS WITH RADICULOPATHY: ICD-10-CM

## 2024-05-03 PROCEDURE — 97113 AQUATIC THERAPY/EXERCISES: CPT | Mod: GP,CQ | Performed by: PHYSICAL THERAPY ASSISTANT

## 2024-05-03 ASSESSMENT — PAIN SCALES - GENERAL: PAINLEVEL_OUTOF10: 8

## 2024-05-03 ASSESSMENT — ACTIVITIES OF DAILY LIVING (ADL): EFFECT OF PAIN ON DAILY ACTIVITIES: SEE GOALS

## 2024-05-03 ASSESSMENT — PAIN - FUNCTIONAL ASSESSMENT: PAIN_FUNCTIONAL_ASSESSMENT: 0-10

## 2024-05-03 NOTE — PROGRESS NOTES
"Physical Therapy    Physical Therapy Treatment    Patient Name: Horacio Gupta  MRN: [id number]  Today's Date: 5/3/2024  Time Calculation  Start Time: 1130  Stop Time: 1215  Time Calculation (min): 45 min  PT Therapeutic Procedures Time Entry  Aquatic Therapy Time Entry: 42                   Current Problem  Problem List Items Addressed This Visit             ICD-10-CM       Neuro    Compression fracture of L5 vertebra (Multi) S32.050A    L4 vertebral fracture (Multi) S32.049A    Lumbosacral spondylosis with radiculopathy M47.27        General  Reason for Referral: lumba comp FX  Referred By: Coty  Past Medical History Relevant to Rehab: [facility]  General Comment: VISIT     Subjective   patient reports no falls and states low back pain of 8/10. Reports doing projects around house.    Precautions  Precautions  Precautions Comment: low risk    Pain  Pain Assessment: 0-10  Pain Score: 8  Pain Type: Chronic pain  Pain Location: Back  Pain Orientation: Lower  Pain Radiating Towards: R LE  Pain Onset: Ongoing  Effect of Pain on Daily Activities: see goals    Objective   SIDE STEPPING x 3 laps  Heel raises x 15  Squats x 15  Hip Abd/add x 15  Hip Flexion x 15  Alt. March Steps x 15  DB ROLLS [place] 30\" ea. Dir.  ABDOMINALS sm blue board x 15 ea. Flexion, push/pull  UE PADDLES  L3 x 20 ea.   Flex/ext, abd/add, H. Abd/add, push/pull  100% UNLOADING 1 noodle  Bike 5'  Hip Abd/add 5'  Hang 5'  GRADUAL EXIT 3'    Treatments:    OP Education      Assessment  Patient tolerated treatment without increased pain.  Patient has good tra and keeps intact with there-ex.  Patient needing one UE support with LE there-ex and with 100% unloading.  Patient has good form/understanding with there-ex and keeps body in good alignment.  Continue with core stability while performing dyn activity to decrease back pain.  Patient verified by name and .    Plan  Continue with core stability to improve sleep, standing, " "ambulation/ADL.       Active       PT Problem       PT Goal 1       Start:  04/18/24    Expected End:  07/17/24       1. Independent HEP to allow for 50% reduction in max ADL C/C sx ( 8/10) 2-3wks  2. 0/10 night time sx to allow for uninterrupted sleep x 1wk ( 7/7) 2-3wks  3. Survey score improvement from 54% to 40% (ASH) 3-4wks  4. Strength increase to allow for improved ADL stdg/walking (from 4- /5 to 4+/5 abdominals) 3-4wks         PT Goal 2       Start:  04/18/24    Expected End:  07/17/24       1. \"I want my back to feel better\".             "

## 2024-05-06 ENCOUNTER — TREATMENT (OUTPATIENT)
Dept: PHYSICAL THERAPY | Facility: CLINIC | Age: 64
End: 2024-05-06
Payer: MEDICARE

## 2024-05-06 DIAGNOSIS — M47.27 LUMBOSACRAL SPONDYLOSIS WITH RADICULOPATHY: ICD-10-CM

## 2024-05-06 DIAGNOSIS — S32.049A CLOSED FRACTURE OF FOURTH LUMBAR VERTEBRA, UNSPECIFIED FRACTURE MORPHOLOGY, INITIAL ENCOUNTER (MULTI): ICD-10-CM

## 2024-05-06 DIAGNOSIS — S32.050S COMPRESSION FRACTURE OF L5 VERTEBRA, SEQUELA: ICD-10-CM

## 2024-05-06 PROCEDURE — 97113 AQUATIC THERAPY/EXERCISES: CPT | Mod: GP,CQ | Performed by: PHYSICAL THERAPY ASSISTANT

## 2024-05-06 ASSESSMENT — PAIN SCALES - GENERAL: PAINLEVEL_OUTOF10: 7

## 2024-05-06 ASSESSMENT — PAIN - FUNCTIONAL ASSESSMENT: PAIN_FUNCTIONAL_ASSESSMENT: 0-10

## 2024-05-06 ASSESSMENT — ACTIVITIES OF DAILY LIVING (ADL): EFFECT OF PAIN ON DAILY ACTIVITIES: SEE GOALS

## 2024-05-06 NOTE — PROGRESS NOTES
"Physical Therapy    Physical Therapy Treatment    Patient Name: Horacio Gupta  MRN: [id number]  Today's Date: 2024  Time Calculation  Start Time: 1430  Stop Time: 1515  Time Calculation (min): 45 min  PT Therapeutic Procedures Time Entry  Aquatic Therapy Time Entry: 41                   Current Problem  Problem List Items Addressed This Visit             ICD-10-CM       Neuro    Compression fracture of L5 vertebra (Multi) S32.050A    L4 vertebral fracture (Multi) S32.049A    Lumbosacral spondylosis with radiculopathy M47.27        General  Reason for Referral: lumba comp FX  Referred By: Coty  Past Medical History Relevant to Rehab: [facility]  General Comment: VISIT     Subjective   Patient reports no falls and states 7/10 low back pain.  Patient reports having 4 hours of carry over post pool, then pain returns.    Precautions  Precautions  Precautions Comment: low risk    Pain  Pain Assessment: 0-10  Pain Score: 7  Pain Type: Chronic pain  Pain Location: Back  Pain Orientation: Lower  Pain Radiating Towards: R LE  Pain Onset: Ongoing  Effect of Pain on Daily Activities: see goals    Objective   SIDE STEPPING x 3 laps  Heel raises x 15  Squats x 15  Hip Abd/add x 15  Hip Flexion x 15  Alt. March Steps x 15  DB ROLLS [place] 30\" ea. Dir.  ABDOMINALS sm blue board x 15 ea. Flexion, push/pull  UE PADDLES  L3 x 20 ea.   Flex/ext, abd/add, H. Abd/add, push/pull  100% UNLOADING 1 noodle  Bike 5'  Hip Abd/add 5'  Hang 5'  GRADUAL EXIT 3'    Treatments:    OP Education      Assessment patient tolerated treatment without increased pain.  Patient has good Tra and keeps intact with there-ex.  Patient has good form/understanding with there-ex and keeps  body in good alignment.  Patient needing one UE support with LE there-ex and with 100% unloading.  Continue with core stability while performing dyn activity to decrease back pain.  Patient verified by name and .    Plan  Continue with core stability to " "improve sleep, standing, ambulation/ADL.       Active       PT Problem       PT Goal 1       Start:  04/18/24    Expected End:  07/17/24       1. Independent HEP to allow for 50% reduction in max ADL C/C sx ( 8/10) 2-3wks  2. 0/10 night time sx to allow for uninterrupted sleep x 1wk ( 7/7) 2-3wks  3. Survey score improvement from 54% to 40% (ASH) 3-4wks  4. Strength increase to allow for improved ADL stdg/walking (from 4- /5 to 4+/5 abdominals) 3-4wks         PT Goal 2       Start:  04/18/24    Expected End:  07/17/24       1. \"I want my back to feel better\".             "

## 2024-05-10 ENCOUNTER — TREATMENT (OUTPATIENT)
Dept: PHYSICAL THERAPY | Facility: CLINIC | Age: 64
End: 2024-05-10
Payer: MEDICARE

## 2024-05-10 DIAGNOSIS — M47.27 LUMBOSACRAL SPONDYLOSIS WITH RADICULOPATHY: ICD-10-CM

## 2024-05-10 DIAGNOSIS — S32.049A CLOSED FRACTURE OF FOURTH LUMBAR VERTEBRA, UNSPECIFIED FRACTURE MORPHOLOGY, INITIAL ENCOUNTER (MULTI): ICD-10-CM

## 2024-05-10 DIAGNOSIS — S32.050S COMPRESSION FRACTURE OF L5 VERTEBRA, SEQUELA: ICD-10-CM

## 2024-05-10 PROCEDURE — 97113 AQUATIC THERAPY/EXERCISES: CPT | Mod: GP,CQ | Performed by: PHYSICAL THERAPY ASSISTANT

## 2024-05-10 ASSESSMENT — PAIN SCALES - GENERAL: PAINLEVEL_OUTOF10: 7

## 2024-05-10 ASSESSMENT — PAIN - FUNCTIONAL ASSESSMENT: PAIN_FUNCTIONAL_ASSESSMENT: 0-10

## 2024-05-10 ASSESSMENT — ACTIVITIES OF DAILY LIVING (ADL): EFFECT OF PAIN ON DAILY ACTIVITIES: SEE GOALS

## 2024-05-10 NOTE — PROGRESS NOTES
"Physical Therapy    Physical Therapy Treatment    Patient Name: Horacio Gupta  MRN: [id number]  Today's Date: 5/10/2024  Time Calculation  Start Time: 1000  Stop Time: 1045  Time Calculation (min): 45 min  PT Therapeutic Procedures Time Entry  Aquatic Therapy Time Entry: 40                   Current Problem  Problem List Items Addressed This Visit             ICD-10-CM       Neuro    Compression fracture of L5 vertebra (Multi) S32.050A    L4 vertebral fracture (Multi) S32.049A    Lumbosacral spondylosis with radiculopathy M47.27        General  Reason for Referral: lumba comp FX  Referred By: Coty  Past Medical History Relevant to Rehab: [facility]  General Comment: VISIT     Subjective  patient reports no falls and states low back pain of 7/10.    Precautions  Precautions  Precautions Comment: low risk    Pain  Pain Assessment: 0-10  Pain Score: 7  Pain Type: Chronic pain  Pain Location: Back  Pain Orientation: Lower  Pain Radiating Towards: R LE  Pain Onset: Ongoing  Effect of Pain on Daily Activities: see goals    Objective     SIDE STEPPING x 3 laps  Heel raises x 15  Squats x 15  Hip Abd/add x 15  Hip Flexion x 15  Alt. March Steps x 15  DB ROLLS [place] 30\" ea. Dir.  ABDOMINALS sm blue board x 15 ea. Flexion, push/pull  UE PADDLES  L3 x 20 ea.   Flex/ext, abd/add, H. Abd/add, push/pull  100% UNLOADING 1 noodle  Bike 5'  Hip Abd/add 5'  Hang 5'  GRADUAL EXIT 3'    Treatments:  Treatments:    OP Education      Assessment  Patient tolerated treatment without increased pain.  Patient has good Tra and keeps intact with there-ex.  Patient needing one UE support with LE there-ex and with 100% unloading.  Patient has good form/understanding with there-ex and keeps body in good alignment.  Continue with core stability while performing dyn activity to decrease back pain.  Patient verified by name and .    Plan Continue with core strength to improve sleep, standing, ambulation.       Active       PT " "Problem       PT Goal 1       Start:  04/18/24    Expected End:  07/17/24       1. Independent HEP to allow for 50% reduction in max ADL C/C sx ( 8/10) 2-3wks  2. 0/10 night time sx to allow for uninterrupted sleep x 1wk ( 7/7) 2-3wks  3. Survey score improvement from 54% to 40% ([facility]) 3-4wks  4. Strength increase to allow for improved ADL stdg/walking (from 4- /5 to 4+/5 abdominals) 3-4wks         PT Goal 2       Start:  04/18/24    Expected End:  07/17/24       1. \"I want my back to feel better\".             "

## 2024-05-13 ENCOUNTER — TREATMENT (OUTPATIENT)
Dept: PHYSICAL THERAPY | Facility: CLINIC | Age: 64
End: 2024-05-13
Payer: MEDICARE

## 2024-05-13 DIAGNOSIS — S32.049A CLOSED FRACTURE OF FOURTH LUMBAR VERTEBRA, UNSPECIFIED FRACTURE MORPHOLOGY, INITIAL ENCOUNTER (MULTI): ICD-10-CM

## 2024-05-13 DIAGNOSIS — S32.050S COMPRESSION FRACTURE OF L5 VERTEBRA, SEQUELA: ICD-10-CM

## 2024-05-13 DIAGNOSIS — M47.27 LUMBOSACRAL SPONDYLOSIS WITH RADICULOPATHY: ICD-10-CM

## 2024-05-13 PROCEDURE — 97113 AQUATIC THERAPY/EXERCISES: CPT | Mod: GP,CQ | Performed by: PHYSICAL THERAPY ASSISTANT

## 2024-05-13 ASSESSMENT — PAIN SCALES - GENERAL: PAINLEVEL_OUTOF10: 7

## 2024-05-13 ASSESSMENT — PAIN - FUNCTIONAL ASSESSMENT: PAIN_FUNCTIONAL_ASSESSMENT: 0-10

## 2024-05-13 NOTE — PROGRESS NOTES
"Physical Therapy    Physical Therapy Treatment    Patient Name: Horacio Gupta  MRN: [id number]  Today's Date: 2024  Time Calculation  Start Time: 1430  Stop Time: 1515  Time Calculation (min): 45 min  PT Therapeutic Procedures Time Entry  Aquatic Therapy Time Entry: 42                   Current Problem  Problem List Items Addressed This Visit             ICD-10-CM       Neuro    Compression fracture of L5 vertebra (Multi) S32.050A    L4 vertebral fracture (Multi) S32.049A    Lumbosacral spondylosis with radiculopathy M47.27        General  Reason for Referral: lumba comp FX  Referred By: Coty  Past Medical History Relevant to Rehab: [facility]  General Comment: VISIT     Subjective  Patient reports no falls and states 7/10 low back pain with pain going down to right calf region.  Patient has appt with neurologist on .    Precautions  Precautions  Precautions Comment: low risk    Pain  Pain Assessment: 0-10  Pain Score: 7  Pain Type: Chronic pain  Pain Location: Back  Pain Orientation: Lower  Pain Radiating Towards: R LE    Objective   SIDE STEPPING x 3 laps  Heel raises x 15  Squats x 15  Hip Abd/add x 15  Hip Flexion x 15  Alt. March Steps x 15  DB ROLLS [place] 30\" ea. Dir.  ABDOMINALS sm blue board x 15 ea. Flexion, push/pull  UE PADDLES  L3 x 20 ea.   Flex/ext, abd/add, H. Abd/add, push/pull  100% UNLOADING 1 noodle  Bike 5'  Hip Abd/add 5'  Hang 5'  GRADUAL EXIT 3'      Treatments:    OP Education      Assessment Patient tolerated treatment without increased back pain.   Patient has good TrA and keeps intact with there-ex. Patient needing one UE support with LE there ex and with 100% unloading.  Patient has good form/understanding with there-ex and keeps body in good alignment.  Continue with core stability while performing dyn activity to decrease back pain.  Patient verified by name and .    Plan  Continue with core stability to improve sleep, standing, ambulation.       Active       " "PT Problem       PT Goal 1       Start:  04/18/24    Expected End:  07/17/24       1. Independent HEP to allow for 50% reduction in max ADL C/C sx ( 8/10) 2-3wks  2. 0/10 night time sx to allow for uninterrupted sleep x 1wk ( 7/7) 2-3wks  3. Survey score improvement from 54% to 40% (ASH) 3-4wks  4. Strength increase to allow for improved ADL stdg/walking (from 4- /5 to 4+/5 abdominals) 3-4wks         PT Goal 2       Start:  04/18/24    Expected End:  07/17/24       1. \"I want my back to feel better\".             "

## 2024-05-15 NOTE — PROGRESS NOTES
Cardiology Subsequent Encounter Clinic Note  Name: Horacio Gupta  MRN: [id number]  : 1960    CC: Heart failure with reduced ejection fraction    Active Issues:  63-year-old gentleman with a past medical history of coronary artery disease status post PCI (RCA 2016), ACC/AHA stage C HFrEF (mixed cardiomyopathy, with last known ejection fraction 30-35% May 2023), status post ICD (2016), ascending aorta aneurysm with moderate central jet of aortic regurgitation, hypertension, obstructive sleep apnea, COPD, ankylosing spondylitis, ulcerative colitis. Patient has previously been seen by advanced heart failure at [facility] (Dr. Delatorre), and Dr. Mason. Here to establish care for heart failure:     Problem #1 ACC/AHA stage C HFrEF  -Current GDMT includes Toprol 100mg daily, lisinopril 20 mg daily, spironolactone 25 mg daily  -Unable to tolerate Entresto secondary to hypotension. Unable to tolerate Jardiance due to headaches     Problem #2 coronary artery disease as detailed above  -Currently on Plavix/statin     Problem #3 aortic root dilatation with secondary aortic regurgitation (moderate), history of ascending aortic aneurysm  -Follows with Dr. Montaño -ascending aorta measurement 4.5 cm 2024.  Stable from previously; will consider repeat imaging in     Since his last appointment the patient underwent a regadenoson stress test (2023) which was negative for reversible perfusion defects.  On this visit denies any chest discomfort or shortness of breath.  Denies any orthopnea/PND.  Does not appear to have any lower extremity edema.  Does note occasional fatigue    Past Medical History  Past Medical History:   Diagnosis Date    Ankylosing spondylitis of unspecified sites in spine (Multi) 2022    Ankylosing spondylitis    Aortic aneurysm of unspecified site, without rupture (CMS-[facility]) 2022    Aortic aneurysm    Atherosclerotic heart disease of native coronary artery without angina  pectoris 07/27/2022    CAD (coronary artery disease)    Chronic obstructive pulmonary disease, unspecified (Multi) 10/11/2022    Chronic obstructive pulmonary disease    Essential (primary) hypertension 10/11/2022    HTN (hypertension), benign    Gastro-esophageal reflux disease without esophagitis 10/11/2022    GERD (gastroesophageal reflux disease)    Noninfective gastroenteritis and colitis, unspecified 06/08/2022    Inflammatory bowel diseases (IBD)    Obstructive sleep apnea (adult) (pediatric) 10/27/2020    Obstructive sleep apnea    Other idiopathic scoliosis, site unspecified 08/17/2020    Idiopathic scoliosis in adult patient    Pure hypercholesterolemia, unspecified 06/08/2022    Hypercholesterolemia    Ulcerative colitis, unspecified, without complications (Multi) 10/11/2022    Ulcerative colitis    Unilateral inguinal hernia, without obstruction or gangrene, not specified as recurrent 12/10/2019    Right inguinal hernia    Unilateral primary osteoarthritis, right hip 03/05/2020    Arthritis of right hip    Unspecified systolic (congestive) heart failure (Multi) 10/11/2022    Systolic heart failure, ACC/AHA stage C    Ventral hernia without obstruction or gangrene 11/25/2019    Ventral hernia    Vitamin D deficiency, unspecified 10/11/2022    Vitamin D deficiency       Past Surgical History  Past Surgical History:   Procedure Laterality Date    ANKLE SURGERY      CARDIAC DEFIBRILLATOR PLACEMENT      COLONOSCOPY      CORONARY ANGIOPLASTY WITH STENT PLACEMENT  01/20/2017    Cath Stent Placement    ESOPHAGOGASTRODUODENOSCOPY      OTHER SURGICAL HISTORY  07/19/2021    Intra-articular corticosteroid injection    OTHER SURGICAL HISTORY  12/10/2019    Finger surgical procedure    TOTAL HIP ARTHROPLASTY Left        Medications  Current Outpatient Medications on File Prior to Visit   Medication Sig Dispense Refill    Advair -21 mcg/actuation inhaler Inhale 2 puffs 2 times a day.      albuterol 2.5 mg /3 mL  (0.083 %) nebulizer solution Inhale 3 mL (2.5 mg) every 6 hours if needed for shortness of breath.      albuterol 90 mcg/actuation inhaler Inhale 2 puffs  in the morning and 2 puffs at noon and 2 puffs in the evening and 2 puffs before bedtime. As directed. 18 g 11    aspirin 81 mg capsule Take 1 tablet by mouth once daily. Only takes when he goes off the plavix      atorvastatin (Lipitor) 80 mg tablet Take 1 tablet (80 mg) by mouth once daily at bedtime. 90 tablet 3    bumetanide (Bumex) 1 mg tablet Take 1 tablet (1 mg) by mouth once daily as needed (For 3 lb weight gain or +2 edema). 90 tablet 3    calcium carbonate-vitamin D3 600 mg-5 mcg (200 unit) tablet Take 1 tablet by mouth once daily.      cetirizine (ZyrTEC) 10 mg tablet Take 1 tablet (10 mg) by mouth once daily. As directed      cholecalciferol (Vitamin D-3) 125 MCG (5000 UT) capsule Take 1 capsule (125 mcg) by mouth every other day.      clopidogrel (Plavix) 75 mg tablet Take 1 tablet (75 mg) by mouth once daily.      diclofenac sodium (Voltaren) 1 % gel APPLY 1 APPLICATION TOPICALLY 4 TIMES DAILY 100 g 0    docusate sodium (Colace) 100 mg capsule Take 1 capsule (100 mg) by mouth 2 times a day.      fexofenadine (Allegra) 180 mg tablet Take 1 tablet (180 mg) by mouth 1 time if needed.      gabapentin (Neurontin) 300 mg capsule Take 1 capsule (300 mg) by mouth 3 times a day.      guaiFENesin (Mucinex) 600 mg 12 hr tablet Take 1 tablet (600 mg) by mouth if needed (bid).      infliximab (Remicade) 100 mg recon soln Infuse 100 mg into a venous catheter.      lactobacillus acidophilus (acidophilus-pectin, citrus) tablet tablet Take 2 tablets by mouth once daily. 180 tablet 3    lisinopril 20 mg tablet Take 0.5 tablets (10 mg) by mouth once daily. 90 tablet 3    LORazepam (Ativan) 2 mg tablet Take 1 p.o. 1 hour before the procedure 1 tablet 0    magnesium oxide (Mag-Ox) 400 mg (241.3 mg magnesium) tablet Take 1 tablet (400 mg) by mouth once daily.       "metoprolol succinate XL (Toprol-XL) 100 mg 24 hr tablet Take 1 tablet (100 mg) by mouth once daily.      multivitamin tablet Take 1 tablet by mouth once daily.      nitroglycerin (Nitrostat) 0.4 mg SL tablet Place 1 tablet (0.4 mg) under the tongue if needed for chest pain.      omeprazole (PriLOSEC) 20 mg DR capsule Take 1 capsule (20 mg) by mouth 2 times a day before meals. 60 capsule 11    polyethylene glycol (Glycolax) 17 gram/dose powder Take 17 g by mouth if needed. Mix 1 packet in 8 ounces of liquid and drink once daily      spironolactone (Aldactone) 25 mg tablet Take 1 tablet (25 mg) by mouth once daily.      tiotropium (Spiriva with HandiHaler) 18 mcg inhalation capsule Place 1 capsule (18 mcg) into inhaler and inhale once daily.      triamcinolone (Kenalog) 0.5 % cream        No current facility-administered medications on file prior to visit.       Allergies  Allergies   Allergen Reactions    Empagliflozin Headache    Penicillins Hives and Angioedema     Angioedema    Ranolazine Unknown    Sacubitril-Valsartan Other and Headache     hypoglycemia    Sulfamethoxazole Headache       Social History  Social History     Tobacco Use    Smoking status: Every Day     Current packs/day: 0.25     Types: Cigarettes     Passive exposure: Current    Smokeless tobacco: Never   Vaping Use    Vaping status: Never Used   Substance Use Topics    Alcohol use: Not Currently    Drug use: Never       Family History  Family History   Problem Relation Name Age of Onset    Stroke Mother      Diabetes Mother      Heart failure Mother      Hypertension Mother      Heart failure Father      Hypertension Father      Diabetes Brother      Other (C A B G x1) Brother         Physical Examination  Vitals: /74   Pulse 67   Ht 1.6 m (5' 3\")   Wt 64.4 kg (142 lb)   SpO2 96%   BMI 25.15 kg/m²   General: awake, alert and oriented. No acute distress.   Skin: Skin is warm, dry and intact without rashes or lesions. Appropriate color " for ethnicity. Nail beds pink with no cyanosis or clubbing  HEENT: normocephalic, atraumatic; conjunctivae are clear without exudates or hemorrhage. Sclera is non-icteric. Eyelids are normal in appearance without swelling or lesions. Hearing intact. Nares are patent bilaterally. Moist mucous membranes.   Cardiovascular: Regular. No murmurs, gallops, or rubs are auscultated. S1 and S2 are heard and are of normal intensity. No JVD, no carotid bruits  Respiratory: Reduced breath sounds bilaterally: No crackles, wheezes or ronchi.   Gastrointestinal: soft, non-distended, BS + x 4  Genitourinary: exam deferred  Musculoskeletal: moves all extremities  Extremities: pulses palpable bilaterally; no swelling or erythema; no edema  Neurological: alert & oriented x 3; no focal deficits  Psychiatric: appropriate mood and affect       Labs/Imaging/Procedures    Lab Results   Component Value Date    HGB 14.7 03/11/2024    HGB 14.5 02/12/2024    HGB 17.3 10/11/2023     03/11/2024    WBC 7.5 03/11/2024     03/11/2024    K 4.1 03/11/2024    CREATININE 0.70 03/11/2024    CREATININE 0.72 02/12/2024    CREATININE 0.88 10/11/2023    BUN 12 03/11/2024    CALCIUM 9.2 03/11/2024    INR 0.9 02/07/2023    BNP 35 08/05/2021    TROPHS 14 02/03/2023    TROPHS 7 02/03/2023    TROPHS 7 02/03/2023    LDLF 88 01/16/2023     Cardiac MRI August 2020  1. Normal LV size (EDVi=92ml/m2) with moderately impaired systolic   function (LVEF=34%).   2. The mid anterior/anterolateral walls are akinetic with global   hypokinesis elsewhere.   3. Moderate central aortic regurgitation.   4. Moderately dilated ascending aorta (4.3 cm), normal aortic root.   5. Transmural LAD territory infarction of the mid   anterior/anterolateral wall with contiguous regions of  subendocardial infarction.     Echocardiogram dated May 2023 shows globally hypokinetic left ventricle with an EF of 30-35%    Impression  63-year-old gentleman with a past medical history of  "coronary artery disease status post PCI (RCA 4/2016), ACC/AHA stage C HFrEF (mixed cardiomyopathy, with last known ejection fraction 30-35% May 2023), status post ICD (8/2016), ascending aorta aneurysm with moderate central jet of aortic regurgitation, hypertension, obstructive sleep apnea, COPD, ankylosing spondylitis, ulcerative colitis. Patient has previously been seen by advanced heart failure at [facility] (Dr. Delatorre), and Dr. Mason. Here to establish care for heart failure:     Problem #1 ACC/AHA stage C HFrEF  -Current GDMT includes Toprol 100mg daily, lisinopril 20 mg daily, spironolactone 25 mg daily  -Unable to tolerate Entresto secondary to hypotension. Unable to tolerate Jardiance due to headaches     Problem #2 coronary artery disease as detailed above  -Currently on Plavix/statin     Problem #3 aortic root dilatation with secondary aortic regurgitation (moderate), history of ascending aortic aneurysm  -Follows with Dr. Montaño -ascending aorta measurement 4.5 cm March 2024.  Stable from previously; will consider repeat imaging in 2025    Since his last appointment the patient underwent a regadenoson stress test (October 2023) which was negative for reversible perfusion defects.  On this visit denies any chest discomfort or shortness of breath.  Denies any orthopnea/PND.  Does not appear to have any lower extremity edema.  Does note occasional fatigue    Plan:  -\"Warm and dry\" from a heart failure standpoint.  On maximally tolerated GDMT  -Repeat echocardiogram  -RTC 6 months      Wilbur Polk MD  Advanced Heart Failure/Transplant Cardiology  Cardio-Oncology  [facility]    "

## 2024-05-16 ENCOUNTER — OFFICE VISIT (OUTPATIENT)
Dept: CARDIOLOGY | Facility: CLINIC | Age: 64
End: 2024-05-16
Payer: MEDICARE

## 2024-05-16 VITALS
HEIGHT: 63 IN | DIASTOLIC BLOOD PRESSURE: 74 MMHG | HEART RATE: 67 BPM | BODY MASS INDEX: 25.16 KG/M2 | OXYGEN SATURATION: 96 % | WEIGHT: 142 LBS | SYSTOLIC BLOOD PRESSURE: 116 MMHG

## 2024-05-16 DIAGNOSIS — I71.9 AORTIC ANEURYSM, UNSPECIFIED PORTION OF AORTA, UNSPECIFIED WHETHER RUPTURED (CMS-HCC): ICD-10-CM

## 2024-05-16 DIAGNOSIS — I25.5 ISCHEMIC CARDIOMYOPATHY: ICD-10-CM

## 2024-05-16 PROCEDURE — 3074F SYST BP LT 130 MM HG: CPT | Performed by: STUDENT IN AN ORGANIZED HEALTH CARE EDUCATION/TRAINING PROGRAM

## 2024-05-16 PROCEDURE — 99214 OFFICE O/P EST MOD 30 MIN: CPT | Performed by: STUDENT IN AN ORGANIZED HEALTH CARE EDUCATION/TRAINING PROGRAM

## 2024-05-16 PROCEDURE — 4004F PT TOBACCO SCREEN RCVD TLK: CPT | Performed by: STUDENT IN AN ORGANIZED HEALTH CARE EDUCATION/TRAINING PROGRAM

## 2024-05-16 PROCEDURE — 3078F DIAST BP <80 MM HG: CPT | Performed by: STUDENT IN AN ORGANIZED HEALTH CARE EDUCATION/TRAINING PROGRAM

## 2024-05-16 PROCEDURE — 93000 ELECTROCARDIOGRAM COMPLETE: CPT | Performed by: STUDENT IN AN ORGANIZED HEALTH CARE EDUCATION/TRAINING PROGRAM

## 2024-05-17 ENCOUNTER — TREATMENT (OUTPATIENT)
Dept: PHYSICAL THERAPY | Facility: CLINIC | Age: 64
End: 2024-05-17
Payer: MEDICARE

## 2024-05-17 DIAGNOSIS — M47.27 LUMBOSACRAL SPONDYLOSIS WITH RADICULOPATHY: ICD-10-CM

## 2024-05-17 DIAGNOSIS — S32.049A CLOSED FRACTURE OF FOURTH LUMBAR VERTEBRA, UNSPECIFIED FRACTURE MORPHOLOGY, INITIAL ENCOUNTER (MULTI): ICD-10-CM

## 2024-05-17 DIAGNOSIS — S32.050S COMPRESSION FRACTURE OF L5 VERTEBRA, SEQUELA: ICD-10-CM

## 2024-05-17 PROCEDURE — 97110 THERAPEUTIC EXERCISES: CPT | Mod: GP

## 2024-05-17 ASSESSMENT — PAIN SCALES - GENERAL: PAINLEVEL_OUTOF10: 7

## 2024-05-17 ASSESSMENT — PAIN - FUNCTIONAL ASSESSMENT: PAIN_FUNCTIONAL_ASSESSMENT: 0-10

## 2024-05-17 NOTE — PROGRESS NOTES
Physical Therapy Treatment    Patient Name: Horacio Gupta  MRN: [id number]  Today's Date: 2024  Time Calculation  Start Time: 1116  Stop Time: 1147  Time Calculation (min): 31 min      PT Therapeutic Procedures Time Entry  Therapeutic Exercise Time Entry: 25                         General:  General  Reason for Referral: lumba comp FX  Referred By: Coty  Past Medical History Relevant to Rehab: ankyosing spo  General Comment: VISIT       Current Problem  Problem List Items Addressed This Visit             ICD-10-CM    Compression fracture of L5 vertebra (Multi) S32.050A    L4 vertebral fracture (Multi) S32.049A    Lumbosacral spondylosis with radiculopathy M47.27         Assessment:Patient identity confirmed today with name/. ;  ( 0 ) falls since last clinic visit; see goals; added to HEP and given handout;       Plan:  This patient agrees to discharge to ongoing HEP and home management with (poor  ) progress achieved.  He plans to continue independent pool ex as able.      Subjective: I have  7 /10 pain right now.    I feel a little better while in the pool but get back to (baseline) sx within 1-2 days after.      Precautions  Precautions  Precautions Comment: low risk      Pain  Pain Assessment: 0-10  Pain Score: 7  Pain Type: Chronic pain  Pain Location: Back  Pain Orientation: Lower  Pain Radiating Towards: RLE    Objective  See goals; no change with max C/C sx level; little to no ADL domi improvements  Manual:___0__min    There ex:__25___min  Goals reviewed, surveyed and/or updated   Seated H add x10 ea with TA set orange  Seated row (alt UE) x10 ea with TA set orange    OP EDUCATION:  Access Code: [email or web address]  URL: [email or web address]  Date: 2024  Prepared by: Joey Chicas    Exercises  - Seated Single Arm Shoulder Horizontal Abduction and Adduction   - Seated High Shoulder Row with Anchored Resistance     Goals:  Active       PT Problem       PT Goal 1        "Start:  04/18/24    Expected End:  07/17/24       1. Independent HEP to allow for 50% reduction in max ADL C/C sx ( 8/10) 2-3wks 8/10 max sx within the last 5 days; 5/17/24; NOT MET  2. 0/10 night time sx to allow for uninterrupted sleep x 1wk ( 7/7) 2-3wks still 7/7; 5/17/24; NOT MET  3. Survey score improvement from 54% to 40% (ASH) 3-4wks 50% as of 5/71/24; PARTIALLY MET   4. Strength increase to allow for improved ADL stdg/walking (from 4- /5 to 4+/5 abdominals) 3-4wks no change with noted ADL; 4/5 abdominals; 5/17/24; PARTIALLY MET          PT Goal 2       Start:  04/18/24    Expected End:  07/17/24       1. \"I want my back to feel better\".\"My back still feels the same after therapy\".; 5/17/24; NOT MET            "

## 2024-06-04 ENCOUNTER — HOSPITAL ENCOUNTER (OUTPATIENT)
Dept: CARDIOLOGY | Facility: HOSPITAL | Age: 64
Discharge: HOME | End: 2024-06-04
Payer: MEDICARE

## 2024-06-04 VITALS
OXYGEN SATURATION: 97 % | WEIGHT: 130 LBS | HEART RATE: 62 BPM | DIASTOLIC BLOOD PRESSURE: 69 MMHG | BODY MASS INDEX: 22.2 KG/M2 | HEIGHT: 64 IN | SYSTOLIC BLOOD PRESSURE: 109 MMHG

## 2024-06-04 DIAGNOSIS — I71.9 AORTIC ANEURYSM, UNSPECIFIED PORTION OF AORTA, UNSPECIFIED WHETHER RUPTURED (CMS-HCC): ICD-10-CM

## 2024-06-04 DIAGNOSIS — I25.5 ISCHEMIC CARDIOMYOPATHY: ICD-10-CM

## 2024-06-04 LAB
AORTIC VALVE MEAN GRADIENT: 3 MMHG
AORTIC VALVE PEAK VELOCITY: 1.14 M/S
AV PEAK GRADIENT: 5.2 MMHG
AVA (PEAK VEL): 2.52 CM2
AVA (VTI): 2.34 CM2
EJECTION FRACTION APICAL 4 CHAMBER: 39.1
LEFT ATRIUM VOLUME AREA LENGTH INDEX BSA: 20.4 ML/M2
LEFT VENTRICLE INTERNAL DIMENSION DIASTOLE: 4.89 CM (ref 3.5–6)
LEFT VENTRICULAR OUTFLOW TRACT DIAMETER: 2 CM
LV EJECTION FRACTION BIPLANE: 47 %
MITRAL VALVE E/A RATIO: 0.74
MITRAL VALVE E/E' RATIO: 13.7
RIGHT VENTRICLE PEAK SYSTOLIC PRESSURE: 28.6 MMHG
TRICUSPID ANNULAR PLANE SYSTOLIC EXCURSION: 1.2 CM

## 2024-06-04 PROCEDURE — 93306 TTE W/DOPPLER COMPLETE: CPT | Performed by: STUDENT IN AN ORGANIZED HEALTH CARE EDUCATION/TRAINING PROGRAM

## 2024-06-04 PROCEDURE — 2500000004 HC RX 250 GENERAL PHARMACY W/ HCPCS (ALT 636 FOR OP/ED): Performed by: STUDENT IN AN ORGANIZED HEALTH CARE EDUCATION/TRAINING PROGRAM

## 2024-06-04 PROCEDURE — 93306 TTE W/DOPPLER COMPLETE: CPT

## 2024-06-04 RX ADMIN — PERFLUTREN 2 ML OF DILUTION: 6.52 INJECTION, SUSPENSION INTRAVENOUS at 10:47

## 2024-06-07 ENCOUNTER — TELEPHONE (OUTPATIENT)
Dept: CARDIOLOGY | Facility: CLINIC | Age: 64
End: 2024-06-07
Payer: MEDICARE

## 2024-06-07 NOTE — TELEPHONE ENCOUNTER
----- Message from Wilbur Polk MD sent at 6/7/2024  2:46 PM EDT -----  Echocardiogram is stable from the 1 performed a year ago.  No action required

## 2024-06-12 ENCOUNTER — OFFICE VISIT (OUTPATIENT)
Dept: NEUROLOGY | Facility: CLINIC | Age: 64
End: 2024-06-12
Payer: MEDICARE

## 2024-06-12 VITALS
SYSTOLIC BLOOD PRESSURE: 114 MMHG | WEIGHT: 143.2 LBS | DIASTOLIC BLOOD PRESSURE: 68 MMHG | BODY MASS INDEX: 24.45 KG/M2 | HEIGHT: 64 IN | RESPIRATION RATE: 20 BRPM | HEART RATE: 61 BPM

## 2024-06-12 DIAGNOSIS — R20.0 NUMBNESS: Primary | ICD-10-CM

## 2024-06-12 DIAGNOSIS — G25.9 MOVEMENT DISORDER: ICD-10-CM

## 2024-06-12 PROCEDURE — 99205 OFFICE O/P NEW HI 60 MIN: CPT | Performed by: STUDENT IN AN ORGANIZED HEALTH CARE EDUCATION/TRAINING PROGRAM

## 2024-06-12 PROCEDURE — 99215 OFFICE O/P EST HI 40 MIN: CPT | Performed by: STUDENT IN AN ORGANIZED HEALTH CARE EDUCATION/TRAINING PROGRAM

## 2024-06-12 PROCEDURE — 3078F DIAST BP <80 MM HG: CPT | Performed by: STUDENT IN AN ORGANIZED HEALTH CARE EDUCATION/TRAINING PROGRAM

## 2024-06-12 PROCEDURE — 3074F SYST BP LT 130 MM HG: CPT | Performed by: STUDENT IN AN ORGANIZED HEALTH CARE EDUCATION/TRAINING PROGRAM

## 2024-06-12 ASSESSMENT — PAIN SCALES - GENERAL: PAINLEVEL: 0-NO PAIN

## 2024-06-12 NOTE — PATIENT INSTRUCTIONS
Get the labs done as instructed and ordered please follow-up with primary care provider for the lab outcome if any of the lab results are abnormal primary care will address them.  I will see you back virtually in about 6 months

## 2024-06-12 NOTE — PROGRESS NOTES
Subjective     Horacio Gupta is a 63 y.o. year old male with history of pain status post pain procedure for the same.  History of borderline diabetes not presenting for history of tremor.  He says that he had bilateral upper extremity tremor when he was on gabapentin for pain.  Subsequently gabapentin was discontinued and his tremor got better.  1 day when he went to pain management clinic he had whole body tremor.  Unclear about the semiology and phenomenology of this condition.  However he was told to see neurologist that is why he is here.  He says that since he discontinued the memantine he has not been seeing any tremor.  No slowness no stiffness no falls no gait issues.  No other neurological focal deficits that are acute.  He does have a patch on his right lower extremity which is not an obvious dermatomal distribution but it is bit numb he does have that for at least a year if not more than that.  It is stable and has not worsened.        Review of Systems  History of tremor otherwise reviewed as above.    Patient Active Problem List   Diagnosis    Abdominal pain    Abnormal gallbladder ultrasound    Abnormal stress test    Anal skin tag    Ankylosing spondylitis (Multi)    Aortic aneurysm (CMS-HCC)    Arthritis of right hip    CAD (coronary artery disease)    Carotid bruit    Cervical spondylosis    Chronic obstructive pulmonary disease (Multi)    COPD exacerbation (Multi)    Cough    Degenerative cervical spinal stenosis    Fasting hyperglycemia    Fatigue due to excessive exertion    GERD (gastroesophageal reflux disease)    Hiatal hernia    HTN (hypertension), benign    Hypercholesterolemia    Idiopathic scoliosis in adult patient    Ischemic cardiomyopathy    Back pain    Low back pain    Moderate aortic regurgitation    NYHA class 3 heart failure with reduced ejection fraction (Multi)    Obstructive sleep apnea    Pleurisy    Presence of cardiac defibrillator    Iliotibial band syndrome of right  side    Right hip pain    Right inguinal hernia    S/P ICD (internal cardiac defibrillator) procedure    Sacroiliitis (CMS-HCC)    Sinus congestion    Chest congestion    SOB (shortness of breath) on exertion    Systolic heart failure, ACC/AHA stage C (Multi)    Ulcerative colitis (Multi)    Inflammatory bowel diseases (IBD)    Ventral hernia    Rectus diastasis    Ventricular tachycardia (Multi)    Vitamin D deficiency    Yellow skin    Trochanteric bursitis of both hips    Compression fracture of L5 vertebra (Multi)    Hip pain    L4 vertebral fracture (Multi)    Left hip pain    Chronic respiratory failure with hypoxia (Multi)    Diarrhea    Abdominal pain, periumbilical    Acute non-recurrent sinusitis    IFG (impaired fasting glucose)    Ankylosing spondylitis of multiple sites in spine (Multi)    Thrombocytopenia, unspecified (CMS-HCC)    Lumbosacral spondylosis with radiculopathy     Past Medical History:   Diagnosis Date    Ankylosing spondylitis of unspecified sites in spine (Multi) 06/08/2022    Ankylosing spondylitis    Aortic aneurysm of unspecified site, without rupture (CMS-HCC) 04/26/2022    Aortic aneurysm    Atherosclerotic heart disease of native coronary artery without angina pectoris 07/27/2022    CAD (coronary artery disease)    Chronic obstructive pulmonary disease, unspecified (Multi) 10/11/2022    Chronic obstructive pulmonary disease    Essential (primary) hypertension 10/11/2022    HTN (hypertension), benign    Gastro-esophageal reflux disease without esophagitis 10/11/2022    GERD (gastroesophageal reflux disease)    Noninfective gastroenteritis and colitis, unspecified 06/08/2022    Inflammatory bowel diseases (IBD)    Obstructive sleep apnea (adult) (pediatric) 10/27/2020    Obstructive sleep apnea    Other idiopathic scoliosis, site unspecified 08/17/2020    Idiopathic scoliosis in adult patient    Pure hypercholesterolemia, unspecified 06/08/2022    Hypercholesterolemia    Ulcerative  colitis, unspecified, without complications (Multi) 10/11/2022    Ulcerative colitis    Unilateral inguinal hernia, without obstruction or gangrene, not specified as recurrent 12/10/2019    Right inguinal hernia    Unilateral primary osteoarthritis, right hip 03/05/2020    Arthritis of right hip    Unspecified systolic (congestive) heart failure (Multi) 10/11/2022    Systolic heart failure, ACC/AHA stage C    Ventral hernia without obstruction or gangrene 11/25/2019    Ventral hernia    Vitamin D deficiency, unspecified 10/11/2022    Vitamin D deficiency     Past Surgical History:   Procedure Laterality Date    ANKLE SURGERY      CARDIAC DEFIBRILLATOR PLACEMENT      COLONOSCOPY      CORONARY ANGIOPLASTY WITH STENT PLACEMENT  01/20/2017    Cath Stent Placement    ESOPHAGOGASTRODUODENOSCOPY      OTHER SURGICAL HISTORY  07/19/2021    Intra-articular corticosteroid injection    OTHER SURGICAL HISTORY  12/10/2019    Finger surgical procedure    TOTAL HIP ARTHROPLASTY Left      Social History     Tobacco Use    Smoking status: Every Day     Current packs/day: 0.25     Types: Cigarettes     Passive exposure: Current    Smokeless tobacco: Never   Substance Use Topics    Alcohol use: Not Currently     family history includes C A B G x1 in his brother; Diabetes in his brother and mother; Heart failure in his father and mother; Hypertension in his father and mother; Stroke in his mother.    Current Outpatient Medications:     Advair -21 mcg/actuation inhaler, Inhale 2 puffs 2 times a day., Disp: , Rfl:     albuterol 2.5 mg /3 mL (0.083 %) nebulizer solution, Inhale 3 mL (2.5 mg) every 6 hours if needed for shortness of breath., Disp: , Rfl:     albuterol 90 mcg/actuation inhaler, Inhale 2 puffs  in the morning and 2 puffs at noon and 2 puffs in the evening and 2 puffs before bedtime. As directed., Disp: 18 g, Rfl: 11    aspirin 81 mg capsule, Take 1 tablet by mouth once daily. Only takes when he goes off the plavix,  Disp: , Rfl:     atorvastatin (Lipitor) 80 mg tablet, Take 1 tablet (80 mg) by mouth once daily at bedtime., Disp: 90 tablet, Rfl: 3    bumetanide (Bumex) 1 mg tablet, Take 1 tablet (1 mg) by mouth once daily as needed (For 3 lb weight gain or +2 edema)., Disp: 90 tablet, Rfl: 3    calcium carbonate-vitamin D3 600 mg-5 mcg (200 unit) tablet, Take 1 tablet by mouth once daily., Disp: , Rfl:     cetirizine (ZyrTEC) 10 mg tablet, Take 1 tablet (10 mg) by mouth once daily. As directed, Disp: , Rfl:     cholecalciferol (Vitamin D-3) 125 MCG (5000 UT) capsule, Take 1 capsule (125 mcg) by mouth every other day., Disp: , Rfl:     clopidogrel (Plavix) 75 mg tablet, Take 1 tablet (75 mg) by mouth once daily., Disp: , Rfl:     diclofenac sodium (Voltaren) 1 % gel, APPLY 1 APPLICATION TOPICALLY 4 TIMES DAILY, Disp: 100 g, Rfl: 0    docusate sodium (Colace) 100 mg capsule, Take 1 capsule (100 mg) by mouth 2 times a day., Disp: , Rfl:     fexofenadine (Allegra) 180 mg tablet, Take 1 tablet (180 mg) by mouth 1 time if needed., Disp: , Rfl:     guaiFENesin (Mucinex) 600 mg 12 hr tablet, Take 1 tablet (600 mg) by mouth if needed (bid)., Disp: , Rfl:     infliximab (Remicade) 100 mg recon soln, Infuse 100 mg into a venous catheter., Disp: , Rfl:     lactobacillus acidophilus (acidophilus-pectin, citrus) tablet tablet, Take 2 tablets by mouth once daily., Disp: 180 tablet, Rfl: 3    lisinopril 20 mg tablet, Take 0.5 tablets (10 mg) by mouth once daily., Disp: 90 tablet, Rfl: 3    magnesium oxide (Mag-Ox) 400 mg (241.3 mg magnesium) tablet, Take 1 tablet (400 mg) by mouth once daily., Disp: , Rfl:     metoprolol succinate XL (Toprol-XL) 100 mg 24 hr tablet, Take 1 tablet (100 mg) by mouth once daily., Disp: , Rfl:     multivitamin tablet, Take 1 tablet by mouth once daily., Disp: , Rfl:     nitroglycerin (Nitrostat) 0.4 mg SL tablet, Place 1 tablet (0.4 mg) under the tongue if needed for chest pain., Disp: , Rfl:     omeprazole  (PriLOSEC) 20 mg DR capsule, Take 1 capsule (20 mg) by mouth 2 times a day before meals., Disp: 60 capsule, Rfl: 11    polyethylene glycol (Glycolax) 17 gram/dose powder, Take 17 g by mouth if needed. Mix 1 packet in 8 ounces of liquid and drink once daily, Disp: , Rfl:     spironolactone (Aldactone) 25 mg tablet, Take 1 tablet (25 mg) by mouth once daily., Disp: , Rfl:     tiotropium (Spiriva with HandiHaler) 18 mcg inhalation capsule, Place 1 capsule (18 mcg) into inhaler and inhale once daily., Disp: , Rfl:     triamcinolone (Kenalog) 0.5 % cream, , Disp: , Rfl:     gabapentin (Neurontin) 300 mg capsule, Take 1 capsule (300 mg) by mouth 3 times a day., Disp: , Rfl:     LORazepam (Ativan) 2 mg tablet, Take 1 p.o. 1 hour before the procedure, Disp: 1 tablet, Rfl: 0  Allergies   Allergen Reactions    Empagliflozin Headache    Penicillins Hives and Angioedema     Angioedema    Ranolazine Unknown    Sacubitril-Valsartan Other and Headache     hypoglycemia    Sulfamethoxazole Headache       Objective   Neurological Exam  Alert oriented x 4, extraocular full, saccades normal, VOR normal, pursuit normal.  Facial sensation intact face symmetric.  No abnormal facial movement.  Both upper extremity power is 5 out of 5 proximally distally lower extremity power is 5 out of 5 proximally distally there is some limitation of movement because of the joint pain on the left knee.  Sensations are intact to touch tactile and vibration both upper and lower extremities but there is a patch on the right lateral aspect of the leg which is somewhat numb compared to the rest because this is chronic and has been going on for more than a year does not follow any dermatomal distribution.    Gait unremarkable    Assessment/Plan   This is a 63-year-old man with a history of borderline diabetes and pain presenting for tremor.  I do not see any tremor today says it has significantly improved and almost nonexistent since he started gabapentin.  I  have not noticed or seeing any previous video or presentation of tremor in this case hence I cannot comment on the phenomenology.  Since he is doing well no additional neurological follow-up needed for his patch on the right lower extremity I suggest him to check HbA1c SPEP UPEP vitamin B12 folate TSH and free T4.  He will follow-up with primary care for this workup for diabetes control.  Neurology will see him virtually in 6 months if the patch of numbness worsens he will call my office for sooner appointment.

## 2024-06-14 ENCOUNTER — LAB (OUTPATIENT)
Dept: LAB | Facility: LAB | Age: 64
End: 2024-06-14
Payer: MEDICARE

## 2024-06-14 DIAGNOSIS — R20.0 NUMBNESS: ICD-10-CM

## 2024-06-14 LAB
FOLATE SERPL-MCNC: 13.8 NG/ML
FT4I SERPL CALC-MCNC: 2.4 (ref 1.6–4.7)
PROT SERPL-MCNC: 6.3 G/DL (ref 6.4–8.2)
PROT UR-ACNC: 19 MG/DL (ref 5–25)
T3RU NFR SERPL: 29 % (ref 24–41)
T4 SERPL-MCNC: 8.2 UG/DL (ref 4.5–11.1)
TSH SERPL-ACNC: 3.35 MIU/L (ref 0.44–3.98)
VIT B12 SERPL-MCNC: 375 PG/ML (ref 211–911)

## 2024-06-14 PROCEDURE — 84479 ASSAY OF THYROID (T3 OR T4): CPT

## 2024-06-14 PROCEDURE — 84166 PROTEIN E-PHORESIS/URINE/CSF: CPT | Performed by: STUDENT IN AN ORGANIZED HEALTH CARE EDUCATION/TRAINING PROGRAM

## 2024-06-14 PROCEDURE — 82746 ASSAY OF FOLIC ACID SERUM: CPT

## 2024-06-14 PROCEDURE — 84156 ASSAY OF PROTEIN URINE: CPT

## 2024-06-14 PROCEDURE — 82607 VITAMIN B-12: CPT

## 2024-06-14 PROCEDURE — 84436 ASSAY OF TOTAL THYROXINE: CPT

## 2024-06-14 PROCEDURE — 36415 COLL VENOUS BLD VENIPUNCTURE: CPT

## 2024-06-14 PROCEDURE — 84165 PROTEIN E-PHORESIS SERUM: CPT | Performed by: STUDENT IN AN ORGANIZED HEALTH CARE EDUCATION/TRAINING PROGRAM

## 2024-06-14 PROCEDURE — 84443 ASSAY THYROID STIM HORMONE: CPT

## 2024-06-14 PROCEDURE — 84155 ASSAY OF PROTEIN SERUM: CPT

## 2024-06-14 PROCEDURE — 84166 PROTEIN E-PHORESIS/URINE/CSF: CPT

## 2024-06-14 PROCEDURE — 84165 PROTEIN E-PHORESIS SERUM: CPT

## 2024-06-18 LAB
ALBUMIN MFR UR ELPH: 30.6 %
ALBUMIN: 3.9 G/DL (ref 3.4–5)
ALPHA 1 GLOBULIN: 0.3 G/DL (ref 0.2–0.6)
ALPHA 2 GLOBULIN: 0.7 G/DL (ref 0.4–1.1)
ALPHA1 GLOB MFR UR ELPH: 14.6 %
ALPHA2 GLOB MFR UR ELPH: 15.1 %
B-GLOBULIN MFR UR ELPH: 24.9 %
BETA GLOBULIN: 0.7 G/DL (ref 0.5–1.2)
GAMMA GLOB MFR UR ELPH: 14.8 %
GAMMA GLOBULIN: 0.7 G/DL (ref 0.5–1.4)
PATH REVIEW-SERUM PROTEIN ELECTROPHORESIS: NORMAL
PATH REVIEW-URINE PROTEIN ELECTROPHORESIS: NORMAL
PROTEIN ELECTROPHORESIS COMMENT: NORMAL
URINE ELECTROPHORESIS COMMENT: NORMAL

## 2024-06-21 ENCOUNTER — HOSPITAL ENCOUNTER (OUTPATIENT)
Dept: CARDIOLOGY | Facility: HOSPITAL | Age: 64
Discharge: HOME | End: 2024-06-21
Payer: MEDICARE

## 2024-06-21 ENCOUNTER — HOSPITAL ENCOUNTER (OUTPATIENT)
Dept: RADIOLOGY | Facility: HOSPITAL | Age: 64
Discharge: HOME | End: 2024-06-21
Payer: MEDICARE

## 2024-06-21 VITALS
SYSTOLIC BLOOD PRESSURE: 94 MMHG | RESPIRATION RATE: 20 BRPM | DIASTOLIC BLOOD PRESSURE: 65 MMHG | OXYGEN SATURATION: 96 % | HEART RATE: 65 BPM

## 2024-06-21 DIAGNOSIS — Z95.0 PACEMAKER: ICD-10-CM

## 2024-06-21 DIAGNOSIS — K86.9 PANCREATIC LESION (HHS-HCC): ICD-10-CM

## 2024-06-21 LAB — BODY SURFACE AREA: 1.57 M2

## 2024-06-21 PROCEDURE — 93287 PERI-PX DEVICE EVAL & PRGR: CPT | Performed by: INTERNAL MEDICINE

## 2024-06-21 PROCEDURE — 2550000001 HC RX 255 CONTRASTS: Mod: SE | Performed by: INTERNAL MEDICINE

## 2024-06-21 PROCEDURE — 74183 MRI ABD W/O CNTR FLWD CNTR: CPT

## 2024-06-21 PROCEDURE — 93287 PERI-PX DEVICE EVAL & PRGR: CPT

## 2024-06-21 PROCEDURE — A9575 INJ GADOTERATE MEGLUMI 0.1ML: HCPCS | Mod: SE | Performed by: INTERNAL MEDICINE

## 2024-06-21 RX ORDER — GADOTERATE MEGLUMINE 376.9 MG/ML
15 INJECTION INTRAVENOUS
Status: COMPLETED | OUTPATIENT
Start: 2024-06-21 | End: 2024-06-21

## 2024-06-21 NOTE — NURSING NOTE
1140 Arrival of device clinic nurse to interrogate patient's pacemaker/AICD into MRI safe mode. P)er device clinic nurse pacing and ICD turned off. BL

## 2024-06-25 ENCOUNTER — CLINICAL SUPPORT (OUTPATIENT)
Dept: CARDIAC REHAB | Facility: HOSPITAL | Age: 64
End: 2024-06-25
Payer: MEDICARE

## 2024-06-25 ENCOUNTER — APPOINTMENT (OUTPATIENT)
Dept: CARDIAC REHAB | Facility: HOSPITAL | Age: 64
End: 2024-06-25
Payer: MEDICARE

## 2024-06-25 ENCOUNTER — HOSPITAL ENCOUNTER (OUTPATIENT)
Dept: CARDIOLOGY | Facility: HOSPITAL | Age: 64
Discharge: HOME | End: 2024-06-25
Payer: MEDICARE

## 2024-06-25 ENCOUNTER — TELEPHONE (OUTPATIENT)
Dept: PRIMARY CARE | Facility: CLINIC | Age: 64
End: 2024-06-25

## 2024-06-25 ENCOUNTER — OFFICE VISIT (OUTPATIENT)
Dept: PRIMARY CARE | Facility: CLINIC | Age: 64
End: 2024-06-25
Payer: MEDICARE

## 2024-06-25 VITALS
DIASTOLIC BLOOD PRESSURE: 72 MMHG | WEIGHT: 140 LBS | BODY MASS INDEX: 23.9 KG/M2 | SYSTOLIC BLOOD PRESSURE: 117 MMHG | HEIGHT: 64 IN | HEART RATE: 61 BPM

## 2024-06-25 VITALS
SYSTOLIC BLOOD PRESSURE: 108 MMHG | OXYGEN SATURATION: 96 % | HEART RATE: 60 BPM | WEIGHT: 140.8 LBS | BODY MASS INDEX: 24.17 KG/M2 | DIASTOLIC BLOOD PRESSURE: 67 MMHG | RESPIRATION RATE: 18 BRPM

## 2024-06-25 DIAGNOSIS — E55.9 VITAMIN D DEFICIENCY: ICD-10-CM

## 2024-06-25 DIAGNOSIS — R73.01 FASTING HYPERGLYCEMIA: ICD-10-CM

## 2024-06-25 DIAGNOSIS — J96.11 CHRONIC RESPIRATORY FAILURE WITH HYPOXIA (MULTI): ICD-10-CM

## 2024-06-25 DIAGNOSIS — I47.20 VT (VENTRICULAR TACHYCARDIA) (MULTI): ICD-10-CM

## 2024-06-25 DIAGNOSIS — Z95.810 PRESENCE OF AUTOMATIC CARDIOVERTER/DEFIBRILLATOR (AICD): ICD-10-CM

## 2024-06-25 DIAGNOSIS — I47.20 VT (VENTRICULAR TACHYCARDIA) (MULTI): Primary | ICD-10-CM

## 2024-06-25 DIAGNOSIS — I50.20 SYSTOLIC HEART FAILURE, UNSPECIFIED HF CHRONICITY (MULTI): ICD-10-CM

## 2024-06-25 DIAGNOSIS — D49.0 IPMN (INTRADUCTAL PAPILLARY MUCINOUS NEOPLASM): Primary | ICD-10-CM

## 2024-06-25 DIAGNOSIS — I10 HTN (HYPERTENSION), BENIGN: ICD-10-CM

## 2024-06-25 LAB — BODY SURFACE AREA: 1.69 M2

## 2024-06-25 PROCEDURE — 99212 OFFICE O/P EST SF 10 MIN: CPT | Performed by: STUDENT IN AN ORGANIZED HEALTH CARE EDUCATION/TRAINING PROGRAM

## 2024-06-25 PROCEDURE — 99214 OFFICE O/P EST MOD 30 MIN: CPT | Performed by: INTERNAL MEDICINE

## 2024-06-25 PROCEDURE — 3078F DIAST BP <80 MM HG: CPT | Performed by: INTERNAL MEDICINE

## 2024-06-25 PROCEDURE — 4004F PT TOBACCO SCREEN RCVD TLK: CPT | Performed by: INTERNAL MEDICINE

## 2024-06-25 PROCEDURE — 3074F SYST BP LT 130 MM HG: CPT | Performed by: INTERNAL MEDICINE

## 2024-06-25 ASSESSMENT — ENCOUNTER SYMPTOMS
VOMITING: 0
WHEEZING: 0
CONFUSION: 0
DIARRHEA: 0
ENDOCRINE NEGATIVE: 1
SHORTNESS OF BREATH: 0
CONSTITUTIONAL NEGATIVE: 1
COUGH: 0
NUMBNESS: 0
ADENOPATHY: 0
ALLERGIC/IMMUNOLOGIC NEGATIVE: 1
LIGHT-HEADEDNESS: 0
BACK PAIN: 0
ABDOMINAL PAIN: 0
CARDIOVASCULAR NEGATIVE: 1
HEADACHES: 0
AGITATION: 0
EYES NEGATIVE: 1
NECK STIFFNESS: 0
NEUROLOGICAL NEGATIVE: 1
RESPIRATORY NEGATIVE: 1
ABDOMINAL DISTENTION: 0
MUSCULOSKELETAL NEGATIVE: 1
CONSTIPATION: 0
NAUSEA: 0
PSYCHIATRIC NEGATIVE: 1
HEMATOLOGIC/LYMPHATIC NEGATIVE: 1
FEVER: 0
JOINT SWELLING: 0
CHILLS: 0
DYSURIA: 0
EYE DISCHARGE: 0
PALPITATIONS: 0
GASTROINTESTINAL NEGATIVE: 1

## 2024-06-25 ASSESSMENT — PAIN - FUNCTIONAL ASSESSMENT: PAIN_FUNCTIONAL_ASSESSMENT: 0-10

## 2024-06-25 ASSESSMENT — PATIENT HEALTH QUESTIONNAIRE - PHQ9
1. LITTLE INTEREST OR PLEASURE IN DOING THINGS: NOT AT ALL
2. FEELING DOWN, DEPRESSED OR HOPELESS: NOT AT ALL
SUM OF ALL RESPONSES TO PHQ9 QUESTIONS 1 AND 2: 0

## 2024-06-25 ASSESSMENT — PAIN SCALES - GENERAL: PAINLEVEL_OUTOF10: 0 - NO PAIN

## 2024-06-25 NOTE — PROGRESS NOTES
Subjective   Patient ID: Horacio Gupta is a 63 y.o. male who presents for Follow-up (FU MRI PANCREAS).  HERE FOR FU AFTER MRCP  FEELS FINE        Review of Systems   Constitutional: Negative.  Negative for chills and fever.   HENT: Negative.  Negative for congestion.    Eyes: Negative.  Negative for discharge.   Respiratory: Negative.  Negative for cough, shortness of breath and wheezing.    Cardiovascular: Negative.  Negative for chest pain, palpitations and leg swelling.   Gastrointestinal: Negative.  Negative for abdominal distention, abdominal pain, constipation, diarrhea, nausea and vomiting.   Endocrine: Negative.    Genitourinary: Negative.  Negative for dysuria and urgency.   Musculoskeletal: Negative.  Negative for back pain, joint swelling and neck stiffness.   Skin: Negative.  Negative for rash.   Allergic/Immunologic: Negative.  Negative for immunocompromised state.   Neurological: Negative.  Negative for light-headedness, numbness and headaches.   Hematological: Negative.  Negative for adenopathy.   Psychiatric/Behavioral: Negative.  Negative for agitation, behavioral problems and confusion.    All other systems reviewed and are negative.      Objective   Physical Exam  Vitals reviewed.   Constitutional:       General: He is not in acute distress.     Appearance: Normal appearance.   HENT:      Head: Normocephalic and atraumatic.      Nose: Nose normal.   Eyes:      Conjunctiva/sclera: Conjunctivae normal.      Pupils: Pupils are equal, round, and reactive to light.   Neck:      Vascular: No carotid bruit.   Cardiovascular:      Rate and Rhythm: Normal rate and regular rhythm.      Pulses: Normal pulses.      Heart sounds:      No gallop.   Pulmonary:      Effort: Pulmonary effort is normal. No respiratory distress.      Breath sounds: Normal breath sounds. No wheezing.   Abdominal:      General: Bowel sounds are normal.      Palpations: Abdomen is soft.      Tenderness: There is no abdominal  "tenderness.   Musculoskeletal:         General: Normal range of motion.      Cervical back: Normal range of motion. No rigidity.   Lymphadenopathy:      Cervical: No cervical adenopathy.   Skin:     General: Skin is warm.      Findings: No rash.   Neurological:      General: No focal deficit present.      Mental Status: He is alert and oriented to person, place, and time.   Psychiatric:         Mood and Affect: Mood normal.         Behavior: Behavior normal.       /72 (BP Location: Right arm, Patient Position: Sitting)   Pulse 61   Ht 1.626 m (5' 4\")   Wt 63.5 kg (140 lb)   BMI 24.03 kg/m²    PSA   Date/Time Value Ref Range Status   01/31/2022 06:55 AM 1.29 0.00 - 4.00 ng/mL Final     Comment:     The FDA requires that the method used for PSA assay be   reported to the physician. Values obtained with different   assay methods must not be used interchangeably. This test  was performed at [facility] using the Access   Hybritech PSA assay is a two-site immunoenzymatic sandwich   assay. The assay is approved for measurement of   prostate-specific antigen (PSA)in serum and may be used   in conjunction with a digital rectal examination in men   50 years and older as an aid in detection of prostate   cancer.  5-Alpha-reductase inhibitors (e.g. Proscar, Finasteride,   Avodart, Dutasteride and Zahira) for the treatment of BPH   have been shown to lower PSA levels by an average of 50%   after 6 months of treatment.       Hemoglobin A1C   Date/Time Value Ref Range Status   02/12/2024 06:51 AM 5.9 (H) see below % Final     Assessment/Plan   Problem List Items Addressed This Visit       Fasting hyperglycemia    Relevant Orders    CBC and Auto Differential    Comprehensive Metabolic Panel    Hemoglobin A1C    HTN (hypertension), benign    Relevant Orders    CBC and Auto Differential    Comprehensive Metabolic Panel    Vitamin D deficiency    Relevant Orders    CBC and Auto Differential    Comprehensive " Metabolic Panel    Vitamin D 25-Hydroxy,Total (for eval of Vitamin D levels)    Chronic respiratory failure with hypoxia (Multi)    Relevant Orders    CBC and Auto Differential    Comprehensive Metabolic Panel     Other Visit Diagnoses       IPMN (intraductal papillary mucinous neoplasm)    -  Primary    Relevant Orders    MRCP pancreas w IV contrast    CBC and Auto Differential    Comprehensive Metabolic Panel           MRCP DW PT    Labs reviewed with pt    MORE PROTEIN      HTN addressed as follow:    MONITOR BP   GOAL BP LOWER THAN 130/80  LOW SALT  EXERCISE DAILY    Diabetes Mellitus/IFG addressed as follow:    [address]  HGA1C GOAL LESS THAN 7  EXERCISE DAILY      WILL REPEAT MRI PANCREAS 1 YEAR      MDM    1) COMPLEXITY: MORE THAN 1 STABLE CHRONIC CONDITION ADDRESSED  2)DATA: TESTS INTERPRETED AND OR ORDERED, TOOK INDEPENDENT HISTORY OR RECORDS REVIEWED  3)RISK: MODERATE RISK DUE TO NATURE OF MEDICAL CONDITIONS/COMORBIDITY OR MEDICATIONS ORDERED OR SURGICAL OR PROCEDURE REFERRAL, .        FU 4 MO BW

## 2024-06-25 NOTE — PROGRESS NOTES
"Horacio arrived ambulatory to the [facility] for his scheduled visit. States he is feeling \" pretty good\". He denies any increased shortness of breath, PND, or Orthopnea. Uses his CPAP nightly as ordered. He has been eating and sleeping without distress. Home medications reviewed without any changes. Horacio had his pacemaker checked today at [facility] in the device clinic by SONYA RN. He also seen Dr. Beckett today without any changes and scheduled a follow up in 4 months with labs prior. The plan is to continue the same regimen and follow up with the [facility] in 4 months. Horacio stated he will get the upcoming labs done the week prior. We reviewed the signs and symptoms of increased heart failure and when to call for help.    Vitals:  BP: 108/67            HR: 60           Resp: 18          SPO2: 96% on RA       Weight: 140.8lbs                         Previous Weight: 148.5lbs         Lung Sounds: Clear     Edema: Trace to BL Lower Ext    JVD: None    Labs: None    Medications Administered: None    Next Appointment Date: October 28th, 2024 @ 9am     Note in EMR for treating Physician: Dr. Polk/Madelaine Hagan DNP    In-House Supervising Physician: Dr. Doe     "

## 2024-06-25 NOTE — PATIENT INSTRUCTIONS
Continue medications at same dose and follow up with the [facility] Failure Clinic on October 28th, 2024 @ 9am     Patient verbalizes understanding for:  Low sodium diet-1500-2000mg daily of sodium  Fluid Restriction  Follow-up appointment with [facility]  Weighing self daily   Medications dose and schedule  Signs of increased heart failure  Activity Recommendations     Diet  2000 mg (2 gram) sodium diet-Do not add salt to foods or cook with salt. Check labels for Sodium (Na)     Resources  [facility] [phone] Monday - Friday 7:00 am - 3:00 pm  Websites: [email or web address].Foneshow; American Heart Association: www.heart.org    Special Instructions:  If you smoke-Quit!  If you are not able to contact your doctor and need immediate medical attention, call 911  If you are not able to keep you're scheduled appointment at the [facility], call [phone]  Watch for and report to your doctor all warning signs of Heart Failure (increased difficulty with breathing, 3-5 pound weight gain in one week or less, increased swelling, increased tiredness)  Weigh yourself each day at the same time with the same amount of clothes on. Record your weight log. Bring it with your to each visit

## 2024-07-21 DIAGNOSIS — I50.20 SYSTOLIC HEART FAILURE, ACC/AHA STAGE C (MULTI): ICD-10-CM

## 2024-07-21 DIAGNOSIS — R07.9 CHEST PAIN, UNSPECIFIED TYPE: ICD-10-CM

## 2024-07-23 RX ORDER — NITROGLYCERIN 0.4 MG/1
0.4 TABLET SUBLINGUAL AS NEEDED
Qty: 90 TABLET | Refills: 3 | Status: SHIPPED | OUTPATIENT
Start: 2024-07-23

## 2024-07-23 RX ORDER — METOPROLOL SUCCINATE 100 MG/1
100 TABLET, EXTENDED RELEASE ORAL DAILY
Qty: 90 TABLET | Refills: 1 | Status: SHIPPED | OUTPATIENT
Start: 2024-07-23

## 2024-07-26 DIAGNOSIS — I50.20 SYSTOLIC HEART FAILURE, ACC/AHA STAGE C (MULTI): ICD-10-CM

## 2024-07-26 RX ORDER — SPIRONOLACTONE 25 MG/1
25 TABLET ORAL DAILY
Qty: 30 TABLET | Refills: 4 | Status: SHIPPED | OUTPATIENT
Start: 2024-07-26

## 2024-09-12 DIAGNOSIS — I25.10 CORONARY ARTERY DISEASE INVOLVING NATIVE CORONARY ARTERY OF NATIVE HEART, UNSPECIFIED WHETHER ANGINA PRESENT: Primary | ICD-10-CM

## 2024-09-13 RX ORDER — CLOPIDOGREL BISULFATE 75 MG/1
75 TABLET ORAL DAILY
Qty: 30 TABLET | Refills: 0 | Status: SHIPPED | OUTPATIENT
Start: 2024-09-13

## 2024-09-24 ENCOUNTER — HOSPITAL ENCOUNTER (OUTPATIENT)
Dept: CARDIOLOGY | Facility: CLINIC | Age: 64
Discharge: HOME | End: 2024-09-24
Payer: MEDICARE

## 2024-09-24 DIAGNOSIS — I47.20 VT (VENTRICULAR TACHYCARDIA) (MULTI): ICD-10-CM

## 2024-09-24 DIAGNOSIS — Z95.810 PRESENCE OF AUTOMATIC CARDIOVERTER/DEFIBRILLATOR (AICD): ICD-10-CM

## 2024-09-24 PROCEDURE — 93296 REM INTERROG EVL PM/IDS: CPT

## 2024-09-27 DIAGNOSIS — M45.0 ANKYLOSING SPONDYLITIS OF MULTIPLE SITES IN SPINE (MULTI): Primary | ICD-10-CM

## 2024-09-27 DIAGNOSIS — S32.050S COMPRESSION FRACTURE OF L5 VERTEBRA, SEQUELA: ICD-10-CM

## 2024-09-27 NOTE — PROGRESS NOTES
Orders Placed This Encounter   Procedures    [place] pain management     Standing Status:   Future     Standing Expiration Date:   9/27/2025     Order Specific Question:   Release result to [facility]     Answer:   Immediate [1]

## 2024-10-07 ENCOUNTER — APPOINTMENT (OUTPATIENT)
Dept: PAIN MEDICINE | Facility: CLINIC | Age: 64
End: 2024-10-07
Payer: MEDICARE

## 2024-10-07 ENCOUNTER — APPOINTMENT (OUTPATIENT)
Dept: RADIOLOGY | Facility: CLINIC | Age: 64
End: 2024-10-07
Payer: MEDICARE

## 2024-10-10 DIAGNOSIS — I25.10 CORONARY ARTERY DISEASE INVOLVING NATIVE CORONARY ARTERY OF NATIVE HEART, UNSPECIFIED WHETHER ANGINA PRESENT: ICD-10-CM

## 2024-10-10 RX ORDER — CLOPIDOGREL BISULFATE 75 MG/1
75 TABLET ORAL DAILY
Qty: 30 TABLET | Refills: 11 | Status: SHIPPED | OUTPATIENT
Start: 2024-10-10

## 2024-10-21 ENCOUNTER — LAB (OUTPATIENT)
Dept: LAB | Facility: LAB | Age: 64
End: 2024-10-21
Payer: MEDICARE

## 2024-10-21 DIAGNOSIS — I10 HTN (HYPERTENSION), BENIGN: ICD-10-CM

## 2024-10-21 DIAGNOSIS — E55.9 VITAMIN D DEFICIENCY: ICD-10-CM

## 2024-10-21 DIAGNOSIS — J96.11 CHRONIC RESPIRATORY FAILURE WITH HYPOXIA (MULTI): ICD-10-CM

## 2024-10-21 DIAGNOSIS — R73.01 FASTING HYPERGLYCEMIA: ICD-10-CM

## 2024-10-21 DIAGNOSIS — D49.0 IPMN (INTRADUCTAL PAPILLARY MUCINOUS NEOPLASM): ICD-10-CM

## 2024-10-21 LAB
25(OH)D3 SERPL-MCNC: 74 NG/ML (ref 30–100)
ALBUMIN SERPL BCP-MCNC: 4.3 G/DL (ref 3.4–5)
ALP SERPL-CCNC: 54 U/L (ref 33–136)
ALT SERPL W P-5'-P-CCNC: 20 U/L (ref 10–52)
ANION GAP SERPL CALC-SCNC: 9 MMOL/L (ref 10–20)
AST SERPL W P-5'-P-CCNC: 23 U/L (ref 9–39)
BASOPHILS # BLD AUTO: 0.04 X10*3/UL (ref 0–0.1)
BASOPHILS NFR BLD AUTO: 0.6 %
BILIRUB SERPL-MCNC: 0.4 MG/DL (ref 0–1.2)
BUN SERPL-MCNC: 17 MG/DL (ref 6–23)
CALCIUM SERPL-MCNC: 9.2 MG/DL (ref 8.6–10.3)
CHLORIDE SERPL-SCNC: 103 MMOL/L (ref 98–107)
CO2 SERPL-SCNC: 34 MMOL/L (ref 21–32)
CREAT SERPL-MCNC: 0.84 MG/DL (ref 0.5–1.3)
EGFRCR SERPLBLD CKD-EPI 2021: >90 ML/MIN/1.73M*2
EOSINOPHIL # BLD AUTO: 0.2 X10*3/UL (ref 0–0.7)
EOSINOPHIL NFR BLD AUTO: 2.9 %
ERYTHROCYTE [DISTWIDTH] IN BLOOD BY AUTOMATED COUNT: 14 % (ref 11.5–14.5)
GLUCOSE SERPL-MCNC: 100 MG/DL (ref 74–99)
HCT VFR BLD AUTO: 49.1 % (ref 41–52)
HGB BLD-MCNC: 16 G/DL (ref 13.5–17.5)
IMM GRANULOCYTES # BLD AUTO: 0.02 X10*3/UL (ref 0–0.7)
IMM GRANULOCYTES NFR BLD AUTO: 0.3 % (ref 0–0.9)
LYMPHOCYTES # BLD AUTO: 2.08 X10*3/UL (ref 1.2–4.8)
LYMPHOCYTES NFR BLD AUTO: 30.2 %
MCH RBC QN AUTO: 31.5 PG (ref 26–34)
MCHC RBC AUTO-ENTMCNC: 32.6 G/DL (ref 32–36)
MCV RBC AUTO: 97 FL (ref 80–100)
MONOCYTES # BLD AUTO: 0.72 X10*3/UL (ref 0.1–1)
MONOCYTES NFR BLD AUTO: 10.5 %
NEUTROPHILS # BLD AUTO: 3.82 X10*3/UL (ref 1.2–7.7)
NEUTROPHILS NFR BLD AUTO: 55.5 %
NRBC BLD-RTO: 0 /100 WBCS (ref 0–0)
PLATELET # BLD AUTO: 247 X10*3/UL (ref 150–450)
POTASSIUM SERPL-SCNC: 4.3 MMOL/L (ref 3.5–5.3)
PROT SERPL-MCNC: 6.5 G/DL (ref 6.4–8.2)
RBC # BLD AUTO: 5.08 X10*6/UL (ref 4.5–5.9)
SODIUM SERPL-SCNC: 142 MMOL/L (ref 136–145)
WBC # BLD AUTO: 6.9 X10*3/UL (ref 4.4–11.3)

## 2024-10-21 PROCEDURE — 83036 HEMOGLOBIN GLYCOSYLATED A1C: CPT

## 2024-10-21 PROCEDURE — 82306 VITAMIN D 25 HYDROXY: CPT

## 2024-10-21 PROCEDURE — 36415 COLL VENOUS BLD VENIPUNCTURE: CPT

## 2024-10-21 PROCEDURE — 80053 COMPREHEN METABOLIC PANEL: CPT

## 2024-10-21 PROCEDURE — 85025 COMPLETE CBC W/AUTO DIFF WBC: CPT

## 2024-10-22 LAB
EST. AVERAGE GLUCOSE BLD GHB EST-MCNC: 123 MG/DL
HBA1C MFR BLD: 5.9 %

## 2024-10-28 ENCOUNTER — CLINICAL SUPPORT (OUTPATIENT)
Dept: CARDIAC REHAB | Facility: HOSPITAL | Age: 64
End: 2024-10-28
Payer: MEDICARE

## 2024-10-28 ENCOUNTER — APPOINTMENT (OUTPATIENT)
Dept: PRIMARY CARE | Facility: CLINIC | Age: 64
End: 2024-10-28
Payer: MEDICARE

## 2024-10-28 VITALS
SYSTOLIC BLOOD PRESSURE: 132 MMHG | BODY MASS INDEX: 26.12 KG/M2 | HEIGHT: 64 IN | HEART RATE: 66 BPM | DIASTOLIC BLOOD PRESSURE: 83 MMHG | WEIGHT: 153 LBS

## 2024-10-28 VITALS
WEIGHT: 152.3 LBS | BODY MASS INDEX: 26.14 KG/M2 | RESPIRATION RATE: 18 BRPM | OXYGEN SATURATION: 94 % | DIASTOLIC BLOOD PRESSURE: 81 MMHG | HEART RATE: 69 BPM | SYSTOLIC BLOOD PRESSURE: 115 MMHG

## 2024-10-28 DIAGNOSIS — M79.652 LEFT THIGH PAIN: ICD-10-CM

## 2024-10-28 DIAGNOSIS — M25.552 LEFT HIP PAIN: ICD-10-CM

## 2024-10-28 DIAGNOSIS — Z23 NEED FOR IMMUNIZATION AGAINST INFLUENZA: ICD-10-CM

## 2024-10-28 DIAGNOSIS — I50.20 NYHA CLASS 3 HEART FAILURE WITH REDUCED EJECTION FRACTION: ICD-10-CM

## 2024-10-28 DIAGNOSIS — Z79.899 MEDICATION MANAGEMENT: ICD-10-CM

## 2024-10-28 DIAGNOSIS — M70.62 TROCHANTERIC BURSITIS, LEFT HIP: ICD-10-CM

## 2024-10-28 DIAGNOSIS — E78.00 HYPERCHOLESTEROLEMIA: ICD-10-CM

## 2024-10-28 DIAGNOSIS — Z00.00 ROUTINE GENERAL MEDICAL EXAMINATION AT HEALTH CARE FACILITY: Primary | ICD-10-CM

## 2024-10-28 DIAGNOSIS — R09.1 PLEURISY: ICD-10-CM

## 2024-10-28 PROCEDURE — 3079F DIAST BP 80-89 MM HG: CPT | Performed by: INTERNAL MEDICINE

## 2024-10-28 PROCEDURE — G0439 PPPS, SUBSEQ VISIT: HCPCS | Performed by: INTERNAL MEDICINE

## 2024-10-28 PROCEDURE — 4004F PT TOBACCO SCREEN RCVD TLK: CPT | Performed by: INTERNAL MEDICINE

## 2024-10-28 PROCEDURE — 99212 OFFICE O/P EST SF 10 MIN: CPT

## 2024-10-28 PROCEDURE — 99214 OFFICE O/P EST MOD 30 MIN: CPT | Performed by: INTERNAL MEDICINE

## 2024-10-28 PROCEDURE — 3075F SYST BP GE 130 - 139MM HG: CPT | Performed by: INTERNAL MEDICINE

## 2024-10-28 PROCEDURE — G0008 ADMIN INFLUENZA VIRUS VAC: HCPCS | Performed by: INTERNAL MEDICINE

## 2024-10-28 PROCEDURE — 3008F BODY MASS INDEX DOCD: CPT | Performed by: INTERNAL MEDICINE

## 2024-10-28 PROCEDURE — 90656 IIV3 VACC NO PRSV 0.5 ML IM: CPT | Performed by: INTERNAL MEDICINE

## 2024-10-28 RX ORDER — DICLOFENAC SODIUM 10 MG/G
GEL TOPICAL
Qty: 100 G | Refills: 0 | Status: SHIPPED | OUTPATIENT
Start: 2024-10-28

## 2024-10-28 RX ORDER — PREDNISONE 10 MG/1
10 TABLET ORAL 3 TIMES DAILY
Qty: 15 TABLET | Refills: 0 | Status: SHIPPED | OUTPATIENT
Start: 2024-10-28 | End: 2024-11-02

## 2024-10-28 RX ORDER — TRIAMCINOLONE ACETONIDE 5 MG/G
CREAM TOPICAL 2 TIMES DAILY
Qty: 15 G | Refills: 0 | Status: SHIPPED | OUTPATIENT
Start: 2024-10-28

## 2024-10-28 RX ORDER — LISINOPRIL 20 MG/1
10 TABLET ORAL DAILY
Qty: 90 TABLET | Refills: 3 | Status: SHIPPED | OUTPATIENT
Start: 2024-10-28

## 2024-10-28 ASSESSMENT — ENCOUNTER SYMPTOMS
ENDOCRINE NEGATIVE: 1
SHORTNESS OF BREATH: 0
VOMITING: 0
CHILLS: 0
JOINT SWELLING: 0
HEMATOLOGIC/LYMPHATIC NEGATIVE: 1
AGITATION: 0
PSYCHIATRIC NEGATIVE: 1
HEADACHES: 0
LIGHT-HEADEDNESS: 0
ALLERGIC/IMMUNOLOGIC NEGATIVE: 1
FEVER: 0
RESPIRATORY NEGATIVE: 1
NECK STIFFNESS: 0
CONFUSION: 0
ABDOMINAL PAIN: 0
EYES NEGATIVE: 1
MUSCULOSKELETAL NEGATIVE: 1
WHEEZING: 0
CONSTIPATION: 0
GASTROINTESTINAL NEGATIVE: 1
ADENOPATHY: 0
DIARRHEA: 0
NAUSEA: 0
NUMBNESS: 0
PALPITATIONS: 0
DYSURIA: 0
CONSTITUTIONAL NEGATIVE: 1
BACK PAIN: 0
EYE DISCHARGE: 0
COUGH: 0
NEUROLOGICAL NEGATIVE: 1
ABDOMINAL DISTENTION: 0

## 2024-10-28 ASSESSMENT — PAIN SCALES - GENERAL: PAINLEVEL_OUTOF10: 0 - NO PAIN

## 2024-10-28 ASSESSMENT — PATIENT HEALTH QUESTIONNAIRE - PHQ9
1. LITTLE INTEREST OR PLEASURE IN DOING THINGS: NOT AT ALL
2. FEELING DOWN, DEPRESSED OR HOPELESS: NOT AT ALL
SUM OF ALL RESPONSES TO PHQ9 QUESTIONS 1 AND 2: 0
SUM OF ALL RESPONSES TO PHQ9 QUESTIONS 1 AND 2: 0
1. LITTLE INTEREST OR PLEASURE IN DOING THINGS: NOT AT ALL
2. FEELING DOWN, DEPRESSED OR HOPELESS: NOT AT ALL

## 2024-10-28 ASSESSMENT — ACTIVITIES OF DAILY LIVING (ADL)
DOING_HOUSEWORK: INDEPENDENT
GROCERY_SHOPPING: INDEPENDENT
DRESSING: INDEPENDENT
BATHING: INDEPENDENT
MANAGING_FINANCES: INDEPENDENT
TAKING_MEDICATION: INDEPENDENT

## 2024-10-28 ASSESSMENT — PAIN - FUNCTIONAL ASSESSMENT: PAIN_FUNCTIONAL_ASSESSMENT: 0-10

## 2024-10-28 NOTE — H&P (VIEW-ONLY)
"Subjective   Reason for Visit: Horacio Gupta is an 63 y.o. male here for a Medicare Wellness visit.     Past Medical, Surgical, and Family History reviewed and updated in chart.    Reviewed all medications by prescribing practitioner or clinical pharmacist (such as prescriptions, OTCs, herbal therapies and supplements) and documented in the medical record.    Here for fu    Co b/l chest wall pain when lay on it, pain with deep breath and cough        Patient Care Team:  Clif Beckett MD as PCP - General  Clif Beckett MD as PCP - [facility] Attributed Provider     Review of Systems   Constitutional: Negative.  Negative for chills and fever.   HENT: Negative.  Negative for congestion.    Eyes: Negative.  Negative for discharge.   Respiratory: Negative.  Negative for cough, shortness of breath and wheezing.    Cardiovascular:  Positive for chest pain (tender chest walll b/l lower). Negative for palpitations and leg swelling.   Gastrointestinal: Negative.  Negative for abdominal distention, abdominal pain, constipation, diarrhea, nausea and vomiting.   Endocrine: Negative.    Genitourinary: Negative.  Negative for dysuria and urgency.   Musculoskeletal: Negative.  Negative for back pain, joint swelling and neck stiffness.   Skin: Negative.  Negative for rash.   Allergic/Immunologic: Negative.  Negative for immunocompromised state.   Neurological: Negative.  Negative for light-headedness, numbness and headaches.   Hematological: Negative.  Negative for adenopathy.   Psychiatric/Behavioral: Negative.  Negative for agitation, behavioral problems and confusion.    All other systems reviewed and are negative.      Objective   Vitals:  /83 (BP Location: Right arm, Patient Position: Sitting)   Pulse 66   Ht 1.626 m (5' 4\")   Wt 69.4 kg (153 lb)   BMI 26.26 kg/m²       Physical Exam  Vitals reviewed.   Constitutional:       General: He is not in acute distress.     Appearance: Normal appearance. " [place] INPATIENT ENCOUNTER  SURGERY DAILY PROGRESS NOTE    Admission date: 1/5/2023  Surgical attending: Dr. Mehran Antonio    HPI:Jacob Mason is a 53 year old male open incarcerated right inguinal hernia repair with mesh on 1/5/2023.      POD #  #1 Day Post-Op     Co-morbid conditions:   Carotid body tumor   Paraganglioma  Vocal cord paralysis- left paralyzed, right paretic  Chronic dysphagia  Chronic shortness of breath  Developmental delay    Subjective/ Interval Events  Intubated and sedated    Medications reviewed  Fentanyl gtt  Propofol gtt    Objective:  Temp  Min: 97.6 °F (36.4 °C)  Max: 99 °F (37.2 °C) Pulse  Min: 63  Max: 116 BP  Min: 77/45  Max: 160/86     Oral intake: NPO  Urine: 1205 mL/8°, 1205 mL/24°  Net: +1 L/24°, +1 L/admission    Recent Weight  58.2 kg (128 lb 4.9 oz) (01/06/23 0100) Admit Weight   Weight: 57.6 kg (127 lb) (01/05/23 1829)     Physical Exam:    General: Sedated  Cardiovascular: Normal rate, regular rhythm.  Respiratory:  Intubated  Abdomen: soft, non-distended  Extremities: warm without edema  Incision: clean, dry and intact with surgical glue, no hematoma nor seroma and minimal ecchymosis.    Labs:  Recent Labs   Lab 01/06/23 0526 01/06/23 0129 01/05/23 2003 01/05/23 1957   WBC 10.3  --   --  11.0   HCT 33.9*  --   --  40.0   HGB 11.6*  --   --  14.0     --   --  292   POTASSIUM  --  3.5  3.6  --  3.3*   MG  --  1.3*  --   --    CREATININE  --  0.47* 0.50* 0.45*     Recent Labs   Lab 01/05/23 1957   AST 37   GPT 44   ALKPT 73   BILIRUBIN 0.5   LIPA 67*      Recent Labs   Lab 01/06/23 0526 01/06/23 0109 01/04/23 1911   GLUB 229* 205* 276*       Best Practice:   DVT/VTE prophylaxis: SQHeparin, SCDs, TEDs  Lux in place: Yes, Lux to remain in place due to: Persistent clinical instability    Assessment & Plan:  53 year old male status post right incarcerated hernia repair with mesh  - Stable from a surgical perspective this morning. Blood pressures are a    HENT:      Head: Normocephalic and atraumatic.      Nose: Nose normal.   Eyes:      Conjunctiva/sclera: Conjunctivae normal.      Pupils: Pupils are equal, round, and reactive to light.   Neck:      Vascular: No carotid bruit.   Cardiovascular:      Rate and Rhythm: Normal rate and regular rhythm.      Pulses: Normal pulses.      Heart sounds:      No gallop.   Pulmonary:      Effort: Pulmonary effort is normal. No respiratory distress.      Breath sounds: Normal breath sounds. No wheezing.   Abdominal:      General: Bowel sounds are normal.      Palpations: Abdomen is soft.      Tenderness: There is no abdominal tenderness.   Musculoskeletal:         General: Tenderness (b/l lowert lateral chest wall) present. Normal range of motion.      Cervical back: Normal range of motion. No rigidity.   Lymphadenopathy:      Cervical: No cervical adenopathy.   Skin:     General: Skin is warm.      Findings: No rash.   Neurological:      General: No focal deficit present.      Mental Status: He is alert and oriented to person, place, and time.   Psychiatric:         Mood and Affect: Mood normal.         Behavior: Behavior normal.         Assessment & Plan  Need for immunization against influenza    Orders:    Flu vaccine, trivalent, preservative free, age 6 months and greater (Fluarix/Fluzone/Flulaval)    Routine general medical examination at health care facility    Orders:    1 Year Follow Up In Primary Care - Wellness Exam; Future    Hypercholesterolemia    Orders:    lisinopril 20 mg tablet; Take 0.5 tablets (10 mg) by mouth once daily.    Pleurisy    Orders:    XR chest 2 views; Future    predniSONE (Deltasone) 10 mg tablet; Take 1 tablet (10 mg) by mouth 3 times a day for 5 days.    Left hip pain    Orders:    diclofenac sodium (Voltaren) 1 % gel; APPLY 1 APPLICATION TOPICALLY 4 TIMES DAILY    Trochanteric bursitis, left hip    Orders:    diclofenac sodium (Voltaren) 1 % gel; APPLY 1 APPLICATION TOPICALLY 4 TIMES  little soft, however, likely secondary to sedation. Afebrile, no tachycardia. No leukocytosis.  - Npo Diet Without Exceptions   - Continue IVF  - I&O's  - Continue to encourage ambulation, cough/deep breathing, and incentive spirometry.     Left vocal cord paralysis and right vocal cord paresis  - ENT consult, appreciate recommendations  - Appreciate critical care's management of ventilation and sedation    Tentative disposition/discharge: To be determined    FANNY Caldera  General Surgery   1/6/2023 10:09 AM     For questions/issues:  Monday-Friday 7am-5pm: [facility] directly ([phone])  Weekends/After hours: Surgeon on call ([phone])       Agree with above note. Patient seen and examined independently.   Patient is still intubated.  Responds to some commands.  No fevers or chills.  Good urine output  Abdomen: Soft, nondistended, right groin incision clean dry intact without evidence of infection.  No evidence of recurrence  Assessment and plan:  From a incarcerated hernia standpoint patient is doing well.  ENT consult today.  Trach PEG this afternoon.      Mehran Antonio MD         DAILY    Left thigh pain    Orders:    diclofenac sodium (Voltaren) 1 % gel; APPLY 1 APPLICATION TOPICALLY 4 TIMES DAILY    Medication management    Orders:    triamcinolone (Kenalog) 0.5 % cream; Apply topically 2 times a day.         Advanced Care Planing discussed, diagnosis , treatment and prognosis discussed with pt ,pt has capacity to make own decision, pt has a living will, to bring a copy for the chart.           HTN addressed as follow:    MONITOR BP   GOAL BP LOWER THAN 130/80  LOW SALT  EXERCISE DAILY    Diabetes Mellitus/IFG addressed as follow:    [address]  HGA1C GOAL LESS THAN 7  LOSE WT  EXERCISE DAILY    Labs reviewed with pt        Fu 2 weeks with cxr

## 2024-10-30 ENCOUNTER — HOSPITAL ENCOUNTER (OUTPATIENT)
Dept: RADIOLOGY | Facility: HOSPITAL | Age: 64
Discharge: HOME | End: 2024-10-30
Payer: MEDICARE

## 2024-10-30 DIAGNOSIS — R09.1 PLEURISY: ICD-10-CM

## 2024-10-30 PROCEDURE — 71046 X-RAY EXAM CHEST 2 VIEWS: CPT | Performed by: RADIOLOGY

## 2024-10-30 PROCEDURE — 71046 X-RAY EXAM CHEST 2 VIEWS: CPT

## 2024-11-04 ENCOUNTER — HOSPITAL ENCOUNTER (OUTPATIENT)
Dept: PAIN MEDICINE | Facility: CLINIC | Age: 64
Discharge: HOME | End: 2024-11-04
Payer: MEDICARE

## 2024-11-04 VITALS
SYSTOLIC BLOOD PRESSURE: 143 MMHG | BODY MASS INDEX: 26.12 KG/M2 | HEIGHT: 64 IN | DIASTOLIC BLOOD PRESSURE: 77 MMHG | HEART RATE: 64 BPM | WEIGHT: 153 LBS | TEMPERATURE: 97.7 F | OXYGEN SATURATION: 95 % | RESPIRATION RATE: 16 BRPM

## 2024-11-04 DIAGNOSIS — S32.050S COMPRESSION FRACTURE OF L5 VERTEBRA, SEQUELA: ICD-10-CM

## 2024-11-04 DIAGNOSIS — M45.0 ANKYLOSING SPONDYLITIS OF MULTIPLE SITES IN SPINE (MULTI): ICD-10-CM

## 2024-11-04 PROCEDURE — 62323 NJX INTERLAMINAR LMBR/SAC: CPT | Performed by: ANESTHESIOLOGY

## 2024-11-04 PROCEDURE — 2500000004 HC RX 250 GENERAL PHARMACY W/ HCPCS (ALT 636 FOR OP/ED): Performed by: ANESTHESIOLOGY

## 2024-11-04 PROCEDURE — 2500000005 HC RX 250 GENERAL PHARMACY W/O HCPCS: Performed by: ANESTHESIOLOGY

## 2024-11-04 PROCEDURE — 2550000001 HC RX 255 CONTRASTS: Performed by: ANESTHESIOLOGY

## 2024-11-04 RX ORDER — SODIUM CHLORIDE 9 MG/ML
INJECTION, SOLUTION INTRAMUSCULAR; INTRAVENOUS; SUBCUTANEOUS AS NEEDED
Status: COMPLETED | OUTPATIENT
Start: 2024-11-04 | End: 2024-11-04

## 2024-11-04 RX ORDER — LIDOCAINE HYDROCHLORIDE 10 MG/ML
INJECTION, SOLUTION EPIDURAL; INFILTRATION; INTRACAUDAL; PERINEURAL AS NEEDED
Status: COMPLETED | OUTPATIENT
Start: 2024-11-04 | End: 2024-11-04

## 2024-11-04 RX ORDER — METHYLPREDNISOLONE ACETATE 80 MG/ML
INJECTION, SUSPENSION INTRA-ARTICULAR; INTRALESIONAL; INTRAMUSCULAR; SOFT TISSUE AS NEEDED
Status: COMPLETED | OUTPATIENT
Start: 2024-11-04 | End: 2024-11-04

## 2024-11-04 ASSESSMENT — ENCOUNTER SYMPTOMS
OCCASIONAL FEELINGS OF UNSTEADINESS: 0
LOSS OF SENSATION IN FEET: 0
DEPRESSION: 0

## 2024-11-04 ASSESSMENT — PAIN - FUNCTIONAL ASSESSMENT: PAIN_FUNCTIONAL_ASSESSMENT: 0-10

## 2024-11-04 ASSESSMENT — PAIN SCALES - GENERAL
PAINLEVEL_OUTOF10: 2
PAINLEVEL_OUTOF10: 5 - MODERATE PAIN

## 2024-11-08 DIAGNOSIS — I71.9 AORTIC ANEURYSM, UNSPECIFIED PORTION OF AORTA, UNSPECIFIED WHETHER RUPTURED (CMS-HCC): Primary | ICD-10-CM

## 2024-11-11 ENCOUNTER — APPOINTMENT (OUTPATIENT)
Dept: PRIMARY CARE | Facility: CLINIC | Age: 64
End: 2024-11-11
Payer: MEDICARE

## 2024-11-11 ENCOUNTER — HOSPITAL ENCOUNTER (OUTPATIENT)
Dept: RADIOLOGY | Facility: HOSPITAL | Age: 64
Discharge: HOME | End: 2024-11-11
Payer: MEDICARE

## 2024-11-11 VITALS
HEIGHT: 64 IN | DIASTOLIC BLOOD PRESSURE: 78 MMHG | SYSTOLIC BLOOD PRESSURE: 119 MMHG | WEIGHT: 157 LBS | BODY MASS INDEX: 26.8 KG/M2 | HEART RATE: 64 BPM

## 2024-11-11 DIAGNOSIS — I71.20 THORACIC AORTIC ANEURYSM WITHOUT RUPTURE, UNSPECIFIED PART (CMS-HCC): ICD-10-CM

## 2024-11-11 DIAGNOSIS — E55.9 VITAMIN D DEFICIENCY: ICD-10-CM

## 2024-11-11 DIAGNOSIS — I71.9 AORTIC ANEURYSM, UNSPECIFIED PORTION OF AORTA, UNSPECIFIED WHETHER RUPTURED (CMS-HCC): ICD-10-CM

## 2024-11-11 DIAGNOSIS — R73.01 IFG (IMPAIRED FASTING GLUCOSE): ICD-10-CM

## 2024-11-11 DIAGNOSIS — I10 HTN (HYPERTENSION), BENIGN: ICD-10-CM

## 2024-11-11 DIAGNOSIS — Z12.5 SPECIAL SCREENING FOR MALIGNANT NEOPLASM OF PROSTATE: ICD-10-CM

## 2024-11-11 DIAGNOSIS — D69.6 THROMBOCYTOPENIA, UNSPECIFIED (CMS-HCC): ICD-10-CM

## 2024-11-11 DIAGNOSIS — I25.10 CORONARY ARTERY DISEASE INVOLVING NATIVE HEART WITHOUT ANGINA PECTORIS, UNSPECIFIED VESSEL OR LESION TYPE: ICD-10-CM

## 2024-11-11 DIAGNOSIS — J44.9 CHRONIC OBSTRUCTIVE PULMONARY DISEASE, UNSPECIFIED COPD TYPE (MULTI): Primary | ICD-10-CM

## 2024-11-11 PROCEDURE — 1159F MED LIST DOCD IN RCRD: CPT | Performed by: INTERNAL MEDICINE

## 2024-11-11 PROCEDURE — 99214 OFFICE O/P EST MOD 30 MIN: CPT | Performed by: INTERNAL MEDICINE

## 2024-11-11 PROCEDURE — 3074F SYST BP LT 130 MM HG: CPT | Performed by: INTERNAL MEDICINE

## 2024-11-11 PROCEDURE — 2550000001 HC RX 255 CONTRASTS

## 2024-11-11 PROCEDURE — 1157F ADVNC CARE PLAN IN RCRD: CPT | Performed by: INTERNAL MEDICINE

## 2024-11-11 PROCEDURE — 1160F RVW MEDS BY RX/DR IN RCRD: CPT | Performed by: INTERNAL MEDICINE

## 2024-11-11 PROCEDURE — 3008F BODY MASS INDEX DOCD: CPT | Performed by: INTERNAL MEDICINE

## 2024-11-11 PROCEDURE — 71275 CT ANGIOGRAPHY CHEST: CPT

## 2024-11-11 PROCEDURE — 4004F PT TOBACCO SCREEN RCVD TLK: CPT | Performed by: INTERNAL MEDICINE

## 2024-11-11 PROCEDURE — 71275 CT ANGIOGRAPHY CHEST: CPT | Performed by: INTERNAL MEDICINE

## 2024-11-11 PROCEDURE — 3078F DIAST BP <80 MM HG: CPT | Performed by: INTERNAL MEDICINE

## 2024-11-11 ASSESSMENT — ENCOUNTER SYMPTOMS
DIARRHEA: 0
NEUROLOGICAL NEGATIVE: 1
CARDIOVASCULAR NEGATIVE: 1
ABDOMINAL PAIN: 0
ADENOPATHY: 0
COUGH: 0
DYSURIA: 0
JOINT SWELLING: 0
CONSTITUTIONAL NEGATIVE: 1
SHORTNESS OF BREATH: 0
WHEEZING: 0
CONSTIPATION: 0
HEADACHES: 0
GASTROINTESTINAL NEGATIVE: 1
AGITATION: 0
NECK STIFFNESS: 0
HEMATOLOGIC/LYMPHATIC NEGATIVE: 1
CONFUSION: 0
CHILLS: 0
PALPITATIONS: 0
BACK PAIN: 0
LIGHT-HEADEDNESS: 0
EYE DISCHARGE: 0
VOMITING: 0
RESPIRATORY NEGATIVE: 1
PSYCHIATRIC NEGATIVE: 1
NAUSEA: 0
EYES NEGATIVE: 1
NUMBNESS: 0
FEVER: 0
ABDOMINAL DISTENTION: 0
ENDOCRINE NEGATIVE: 1
MUSCULOSKELETAL NEGATIVE: 1
ALLERGIC/IMMUNOLOGIC NEGATIVE: 1

## 2024-11-11 ASSESSMENT — PATIENT HEALTH QUESTIONNAIRE - PHQ9
SUM OF ALL RESPONSES TO PHQ9 QUESTIONS 1 AND 2: 0
2. FEELING DOWN, DEPRESSED OR HOPELESS: NOT AT ALL
1. LITTLE INTEREST OR PLEASURE IN DOING THINGS: NOT AT ALL

## 2024-11-11 NOTE — PROGRESS NOTES
Subjective   Patient ID: Horacio Gupta is a 64 y.o. male who presents for Follow-up (2 WEEK F/U WITH CXR).  HERE FOR FU FEELS FINE CP RESOLVED        Review of Systems   Constitutional: Negative.  Negative for chills and fever.   HENT: Negative.  Negative for congestion.    Eyes: Negative.  Negative for discharge.   Respiratory: Negative.  Negative for cough, shortness of breath and wheezing.    Cardiovascular: Negative.  Negative for chest pain, palpitations and leg swelling.   Gastrointestinal: Negative.  Negative for abdominal distention, abdominal pain, constipation, diarrhea, nausea and vomiting.   Endocrine: Negative.    Genitourinary: Negative.  Negative for dysuria and urgency.   Musculoskeletal: Negative.  Negative for back pain, joint swelling and neck stiffness.   Skin: Negative.  Negative for rash.   Allergic/Immunologic: Negative.  Negative for immunocompromised state.   Neurological: Negative.  Negative for light-headedness, numbness and headaches.   Hematological: Negative.  Negative for adenopathy.   Psychiatric/Behavioral: Negative.  Negative for agitation, behavioral problems and confusion.    All other systems reviewed and are negative.      Objective   Physical Exam  Vitals reviewed.   Constitutional:       General: He is not in acute distress.     Appearance: Normal appearance.   HENT:      Head: Normocephalic and atraumatic.      Nose: Nose normal.   Eyes:      Conjunctiva/sclera: Conjunctivae normal.      Pupils: Pupils are equal, round, and reactive to light.   Neck:      Vascular: No carotid bruit.   Cardiovascular:      Rate and Rhythm: Normal rate and regular rhythm.      Pulses: Normal pulses.      Heart sounds:      No gallop.   Pulmonary:      Effort: Pulmonary effort is normal. No respiratory distress.      Breath sounds: No wheezing.      Comments: DIMINISHED BS  Abdominal:      General: Bowel sounds are normal.      Palpations: Abdomen is soft.      Tenderness: There is no  "abdominal tenderness.   Musculoskeletal:         General: Normal range of motion.      Cervical back: Normal range of motion. No rigidity.   Lymphadenopathy:      Cervical: No cervical adenopathy.   Skin:     General: Skin is warm.      Findings: No rash.   Neurological:      General: No focal deficit present.      Mental Status: He is alert and oriented to person, place, and time.   Psychiatric:         Mood and Affect: Mood normal.         Behavior: Behavior normal.       /78   Pulse 64   Ht 1.626 m (5' 4\")   Wt 71.2 kg (157 lb)   BMI 26.95 kg/m²    PSA   Date/Time Value Ref Range Status   01/31/2022 06:55 AM 1.29 0.00 - 4.00 ng/mL Final     Comment:     The FDA requires that the method used for PSA assay be   reported to the physician. Values obtained with different   assay methods must not be used interchangeably. This test  was performed at [facility] using the Access   [employer] PSA assay is a two-site immunoenzymatic sandwich   assay. The assay is approved for measurement of   prostate-specific antigen (PSA)in serum and may be used   in conjunction with a digital rectal examination in men   50 years and older as an aid in detection of prostate   cancer.  5-Alpha-reductase inhibitors (e.g. Proscar, Finasteride,   Avodart, Dutasteride and Zahira) for the treatment of BPH   have been shown to lower PSA levels by an average of 50%   after 6 months of treatment.       Hemoglobin A1C   Date/Time Value Ref Range Status   10/21/2024 08:34 AM 5.9 (H) See comment % Final     Assessment/Plan   Problem List Items Addressed This Visit       CAD (coronary artery disease)    Relevant Orders    Lipid Panel    Chronic obstructive pulmonary disease (Multi) - Primary    Relevant Orders    CBC and Auto Differential    Comprehensive Metabolic Panel    HTN (hypertension), benign    Relevant Orders    CBC and Auto Differential    Comprehensive Metabolic Panel    Vitamin D deficiency    Relevant Orders    " CBC and Auto Differential    Comprehensive Metabolic Panel    Vitamin D 25-Hydroxy,Total (for eval of Vitamin D levels)    IFG (impaired fasting glucose)    Relevant Orders    CBC and Auto Differential    Comprehensive Metabolic Panel    Hemoglobin A1C    Thrombocytopenia, unspecified (CMS-HCC)    Relevant Orders    CBC and Auto Differential    Comprehensive Metabolic Panel     Other Visit Diagnoses       Special screening for malignant neoplasm of prostate        Relevant Orders    Prostate Specific Antigen, Screen           CXR -      Clinically pleurisy improved        HTN addressed as follow:    MONITOR BP   GOAL BP LOWER THAN 130/80  LOW SALT  EXERCISE DAILY    Diabetes Mellitus/IFG addressed as follow:    [address]  HGA1C GOAL LESS THAN 7  LOSE WT  EXERCISE DAILY      I have counselled patient about need for smoking/tobacco cessation and how I can support efforts when patient is ready to quit.  Discussed nicotine replacement therapy, Varenicline, Bupropion, hypnosis, support groups, and acupuncture as potential options.  I spend 4 minutes counseling.      FU 4 MO BW

## 2024-11-13 NOTE — PROGRESS NOTES
Cardiology Subsequent Encounter Clinic Note  Name: Horacio Gupta  MRN: [id number]  : 1960    CC: Heart failure with reduced ejection fraction    Active Issues:  64-year-old gentleman with a past medical history of coronary artery disease status post PCI (RCA 2016), ACC/AHA stage C HFrEF (mixed cardiomyopathy, with last known ejection fraction 30-35% May 2023), status post ICD (2016), ascending aorta aneurysm with moderate central jet of aortic regurgitation, hypertension, obstructive sleep apnea, COPD, ankylosing spondylitis, ulcerative colitis. Patient has previously been seen by advanced heart failure at [facility] (Dr. Delatorre), and Dr. Mason. Here to establish care for heart failure:     Problem #1 ACC/AHA stage C HFrEF  -Current GDMT includes Toprol 100mg daily, spironolactone 25 mg daily  -Unable to tolerate Entresto secondary to hypotension. Unable to tolerate Jardiance due to headaches  -Tells me that he has been only taking his lisinopril as needed for systolic blood pressure greater than 110     Problem #2 coronary artery disease as detailed above  -Currently on Plavix/statin     Problem #3 aortic root dilatation with secondary aortic regurgitation (moderate), history of ascending aortic aneurysm  -Follows with Dr. Montaño -ascending aorta measurement 4.3 cm 2024.  Stable from previous    Patient underwent a regadenoson stress test (2023) which was negative for reversible perfusion defects.  On this visit denies any chest discomfort or shortness of breath.  Denies any orthopnea/PND.  Does not appear to have any lower extremity edema.  Does note occasional fatigue    Past Medical History  Past Medical History:   Diagnosis Date    Ankylosing spondylitis of unspecified sites in spine (Multi) 2022    Ankylosing spondylitis    Aortic aneurysm of unspecified site, without rupture ([facility]) 2022    Aortic aneurysm    Atherosclerotic heart disease of native coronary artery  without angina pectoris 07/27/2022    CAD (coronary artery disease)    Chronic obstructive pulmonary disease, unspecified 10/11/2022    Chronic obstructive pulmonary disease    Essential (primary) hypertension 10/11/2022    HTN (hypertension), benign    Gastro-esophageal reflux disease without esophagitis 10/11/2022    GERD (gastroesophageal reflux disease)    Noninfective gastroenteritis and colitis, unspecified 06/08/2022    Inflammatory bowel diseases (IBD)    Obstructive sleep apnea (adult) (pediatric) 10/27/2020    Obstructive sleep apnea    Other idiopathic scoliosis, site unspecified 08/17/2020    Idiopathic scoliosis in adult patient    Pure hypercholesterolemia, unspecified 06/08/2022    Hypercholesterolemia    Ulcerative colitis, unspecified, without complications (Multi) 10/11/2022    Ulcerative colitis    Unilateral inguinal hernia, without obstruction or gangrene, not specified as recurrent 12/10/2019    Right inguinal hernia    Unilateral primary osteoarthritis, right hip 03/05/2020    Arthritis of right hip    Unspecified systolic (congestive) heart failure 10/11/2022    Systolic heart failure, ACC/AHA stage C    Ventral hernia without obstruction or gangrene 11/25/2019    Ventral hernia    Vitamin D deficiency, unspecified 10/11/2022    Vitamin D deficiency       Past Surgical History  Past Surgical History:   Procedure Laterality Date    ANKLE SURGERY      CARDIAC DEFIBRILLATOR PLACEMENT      COLONOSCOPY      CORONARY ANGIOPLASTY WITH STENT PLACEMENT  01/20/2017    Cath Stent Placement    ESOPHAGOGASTRODUODENOSCOPY      OTHER SURGICAL HISTORY  07/19/2021    Intra-articular corticosteroid injection    OTHER SURGICAL HISTORY  12/10/2019    Finger surgical procedure    TOTAL HIP ARTHROPLASTY Left        Medications  Current Outpatient Medications on File Prior to Visit   Medication Sig Dispense Refill    Advair -21 mcg/actuation inhaler Inhale 2 puffs 2 times a day.      albuterol 2.5 mg /3 mL  (0.083 %) nebulizer solution Inhale 3 mL (2.5 mg) every 6 hours if needed for shortness of breath.      albuterol 90 mcg/actuation inhaler Inhale 2 puffs  in the morning and 2 puffs at noon and 2 puffs in the evening and 2 puffs before bedtime. As directed. 18 g 11    aspirin 81 mg capsule Take 1 tablet by mouth once daily. Only takes when he goes off the plavix      atorvastatin (Lipitor) 80 mg tablet Take 1 tablet (80 mg) by mouth once daily at bedtime. 90 tablet 3    bumetanide (Bumex) 1 mg tablet Take 1 tablet (1 mg) by mouth once daily as needed (For 3 lb weight gain or +2 edema). 90 tablet 3    calcium carbonate-vitamin D3 600 mg-5 mcg (200 unit) tablet Take 1 tablet by mouth once daily.      cetirizine (ZyrTEC) 10 mg tablet Take 1 tablet (10 mg) by mouth once daily. As directed      cholecalciferol (Vitamin D-3) 125 MCG (5000 UT) capsule Take 1 capsule (125 mcg) by mouth every other day.      clopidogrel (Plavix) 75 mg tablet Take 1 tablet by mouth once daily 30 tablet 11    diclofenac sodium (Voltaren) 1 % gel APPLY 1 APPLICATION TOPICALLY 4 TIMES DAILY 100 g 0    docusate sodium (Colace) 100 mg capsule Take 1 capsule (100 mg) by mouth 2 times a day.      fexofenadine (Allegra) 180 mg tablet Take 1 tablet (180 mg) by mouth 1 time if needed.      gabapentin (Neurontin) 300 mg capsule Take 1 capsule (300 mg) by mouth 3 times a day.      guaiFENesin (Mucinex) 600 mg 12 hr tablet Take 1 tablet (600 mg) by mouth if needed (bid).      infliximab (Remicade) 100 mg recon soln Infuse 100 mg into a venous catheter.      lactobacillus acidophilus (acidophilus-pectin, citrus) tablet tablet Take 2 tablets by mouth once daily. 180 tablet 3    lisinopril 20 mg tablet Take 0.5 tablets (10 mg) by mouth once daily. 90 tablet 3    LORazepam (Ativan) 2 mg tablet Take 1 p.o. 1 hour before the procedure 1 tablet 0    magnesium oxide (Mag-Ox) 400 mg (241.3 mg magnesium) tablet Take 1 tablet (400 mg) by mouth once daily.       "metoprolol succinate XL (Toprol-XL) 100 mg 24 hr tablet Take 1 tablet by mouth once daily 90 tablet 1    multivitamin tablet Take 1 tablet by mouth once daily.      nitroglycerin (Nitrostat) 0.4 mg SL tablet Place 1 tablet (0.4 mg) under the tongue if needed for chest pain. 90 tablet 3    omeprazole (PriLOSEC) 20 mg DR capsule Take 1 capsule (20 mg) by mouth 2 times a day before meals. 60 capsule 11    polyethylene glycol (Glycolax) 17 gram/dose powder Mix 17 g of powder and drink if needed. Mix 1 packet in 8 ounces of liquid and drink once daily      spironolactone (Aldactone) 25 mg tablet Take 1 tablet by mouth once daily 30 tablet 4    tiotropium (Spiriva with HandiHaler) 18 mcg inhalation capsule Place 1 capsule (18 mcg) into inhaler and inhale once daily.      triamcinolone (Kenalog) 0.5 % cream Apply topically 2 times a day. 15 g 0     No current facility-administered medications on file prior to visit.       Allergies  Allergies   Allergen Reactions    Empagliflozin Headache    Penicillins Hives and Angioedema     Angioedema    Ranolazine Unknown    Sacubitril-Valsartan Other and Headache     hypoglycemia    Sulfamethoxazole Headache       Social History  Social History     Tobacco Use    Smoking status: Every Day     Current packs/day: 0.25     Types: Cigarettes     Passive exposure: Current    Smokeless tobacco: Never   Vaping Use    Vaping status: Never Used   Substance Use Topics    Alcohol use: Not Currently    Drug use: Never       Family History  Family History   Problem Relation Name Age of Onset    Stroke Mother      Diabetes Mother      Heart failure Mother      Hypertension Mother      Heart failure Father      Hypertension Father      Diabetes Brother      Other (C A B G x1) Brother         Physical Examination  Vitals: /70   Pulse 59   Ht 1.626 m (5' 4\")   Wt 69.8 kg (153 lb 14.4 oz)   SpO2 95%   BMI 26.42 kg/m²   General: awake, alert and oriented. No acute distress.   Skin: Skin is " warm, dry and intact without rashes or lesions. Appropriate color for ethnicity. Nail beds pink with no cyanosis or clubbing  HEENT: normocephalic, atraumatic; conjunctivae are clear without exudates or hemorrhage. Sclera is non-icteric. Eyelids are normal in appearance without swelling or lesions. Hearing intact. Nares are patent bilaterally. Moist mucous membranes.   Cardiovascular: Regular. No murmurs, gallops, or rubs are auscultated. S1 and S2 are heard and are of normal intensity. No JVD, no carotid bruits  Respiratory: Reduced breath sounds bilaterally: No crackles, wheezes or ronchi.   Gastrointestinal: soft, non-distended, BS + x 4  Genitourinary: exam deferred  Musculoskeletal: moves all extremities  Extremities: pulses palpable bilaterally; no swelling or erythema; no edema  Neurological: alert & oriented x 3; no focal deficits  Psychiatric: appropriate mood and affect       Labs/Imaging/Procedures    Lab Results   Component Value Date    HGB 16.0 10/21/2024    HGB 14.7 03/11/2024    HGB 14.5 02/12/2024     10/21/2024    WBC 6.9 10/21/2024     10/21/2024    K 4.3 10/21/2024    CREATININE 0.84 10/21/2024    CREATININE 0.70 03/11/2024    CREATININE 0.72 02/12/2024    BUN 17 10/21/2024    CALCIUM 9.2 10/21/2024    INR 0.9 02/07/2023    BNP 35 08/05/2021    TROPHS 14 02/03/2023    TROPHS 7 02/03/2023    TROPHS 7 02/03/2023    LDLF 88 01/16/2023     Cardiac MRI August 2020  1. Normal LV size (EDVi=92ml/m2) with moderately impaired systolic   function (LVEF=34%).   2. The mid anterior/anterolateral walls are akinetic with global   hypokinesis elsewhere.   3. Moderate central aortic regurgitation.   4. Moderately dilated ascending aorta (4.3 cm), normal aortic root.   5. Transmural LAD territory infarction of the mid   anterior/anterolateral wall with contiguous regions of  subendocardial infarction.     Echocardiogram dated May 2023 shows globally hypokinetic left ventricle with an EF of  "30-35%    Impression  64-year-old gentleman with a past medical history of coronary artery disease status post PCI (RCA 4/2016), ACC/AHA stage C HFrEF (mixed cardiomyopathy, with last known ejection fraction 30-35% May 2023), status post ICD (8/2016), ascending aorta aneurysm with moderate central jet of aortic regurgitation, hypertension, obstructive sleep apnea, COPD, ankylosing spondylitis, ulcerative colitis. Patient has previously been seen by advanced heart failure at [facility] (Dr. Delatorre), and Dr. Mason. Here to establish care for heart failure:     Problem #1 ACC/AHA stage C HFrEF  -Current GDMT includes Toprol 100mg daily, spironolactone 25 mg daily  -Unable to tolerate Entresto secondary to hypotension. Unable to tolerate Jardiance due to headaches  -Tells me that he has been only taking his lisinopril as needed for systolic blood pressure greater than 110     Problem #2 coronary artery disease as detailed above  -Currently on Plavix/statin     Problem #3 aortic root dilatation with secondary aortic regurgitation (moderate), history of ascending aortic aneurysm  -Follows with Dr. Montaño -ascending aorta measurement 4.3 cm November 2024.  Stable from previous    Patient underwent a regadenoson stress test (October 2023) which was negative for reversible perfusion defects.  On this visit denies any chest discomfort or shortness of breath.  Denies any orthopnea/PND.  Does not appear to have any lower extremity edema.  Does note occasional fatigue    Plan:  -\"Warm and dry\" from a heart failure standpoint.  On maximally tolerated GDMT -asked him to take lisinopril 5 mg daily as long as he is not symptomatic or hypotensive with systolic in the 80s.  -Last echocardiogram in 2024 shows an EF of 35-40%.  Last device interrogation was unremarkable  -RTC 6 months      Wilbur Polk MD  Advanced Heart Failure/Transplant Cardiology  Cardio-Oncology  [facility]    "

## 2024-11-14 ENCOUNTER — APPOINTMENT (OUTPATIENT)
Dept: CARDIOLOGY | Facility: CLINIC | Age: 64
End: 2024-11-14
Payer: MEDICARE

## 2024-11-14 VITALS
BODY MASS INDEX: 26.27 KG/M2 | HEIGHT: 64 IN | SYSTOLIC BLOOD PRESSURE: 110 MMHG | DIASTOLIC BLOOD PRESSURE: 70 MMHG | HEART RATE: 59 BPM | OXYGEN SATURATION: 95 % | WEIGHT: 153.9 LBS

## 2024-11-14 DIAGNOSIS — I50.20 SYSTOLIC HEART FAILURE, ACC/AHA STAGE C: ICD-10-CM

## 2024-11-14 DIAGNOSIS — I71.9 AORTIC ANEURYSM WITHOUT RUPTURE, UNSPECIFIED PORTION OF AORTA (CMS-HCC): Primary | ICD-10-CM

## 2024-11-14 DIAGNOSIS — I25.10 CORONARY ARTERY DISEASE INVOLVING NATIVE CORONARY ARTERY OF NATIVE HEART WITHOUT ANGINA PECTORIS: ICD-10-CM

## 2024-11-14 DIAGNOSIS — E78.00 HYPERCHOLESTEROLEMIA: ICD-10-CM

## 2024-11-14 PROCEDURE — G2211 COMPLEX E/M VISIT ADD ON: HCPCS | Performed by: STUDENT IN AN ORGANIZED HEALTH CARE EDUCATION/TRAINING PROGRAM

## 2024-11-14 PROCEDURE — 3008F BODY MASS INDEX DOCD: CPT | Performed by: STUDENT IN AN ORGANIZED HEALTH CARE EDUCATION/TRAINING PROGRAM

## 2024-11-14 PROCEDURE — 3074F SYST BP LT 130 MM HG: CPT | Performed by: STUDENT IN AN ORGANIZED HEALTH CARE EDUCATION/TRAINING PROGRAM

## 2024-11-14 PROCEDURE — 3078F DIAST BP <80 MM HG: CPT | Performed by: STUDENT IN AN ORGANIZED HEALTH CARE EDUCATION/TRAINING PROGRAM

## 2024-11-14 PROCEDURE — 1157F ADVNC CARE PLAN IN RCRD: CPT | Performed by: STUDENT IN AN ORGANIZED HEALTH CARE EDUCATION/TRAINING PROGRAM

## 2024-11-14 PROCEDURE — 1159F MED LIST DOCD IN RCRD: CPT | Performed by: STUDENT IN AN ORGANIZED HEALTH CARE EDUCATION/TRAINING PROGRAM

## 2024-11-14 PROCEDURE — 99214 OFFICE O/P EST MOD 30 MIN: CPT | Performed by: STUDENT IN AN ORGANIZED HEALTH CARE EDUCATION/TRAINING PROGRAM

## 2024-11-14 PROCEDURE — 4004F PT TOBACCO SCREEN RCVD TLK: CPT | Performed by: STUDENT IN AN ORGANIZED HEALTH CARE EDUCATION/TRAINING PROGRAM

## 2024-11-14 RX ORDER — LISINOPRIL 5 MG/1
5 TABLET ORAL DAILY
Qty: 30 TABLET | Refills: 3 | Status: SHIPPED | OUTPATIENT
Start: 2024-11-14 | End: 2025-11-14

## 2024-11-27 ENCOUNTER — APPOINTMENT (OUTPATIENT)
Dept: CARDIOLOGY | Facility: CLINIC | Age: 64
End: 2024-11-27
Payer: MEDICARE

## 2024-11-27 VITALS
WEIGHT: 160.8 LBS | HEART RATE: 67 BPM | SYSTOLIC BLOOD PRESSURE: 112 MMHG | OXYGEN SATURATION: 94 % | BODY MASS INDEX: 28.49 KG/M2 | HEIGHT: 63 IN | DIASTOLIC BLOOD PRESSURE: 66 MMHG

## 2024-11-27 DIAGNOSIS — I71.21 ANEURYSM OF ASCENDING AORTA WITHOUT RUPTURE (CMS-HCC): Primary | ICD-10-CM

## 2024-11-27 DIAGNOSIS — I25.118 CORONARY ARTERY DISEASE INVOLVING NATIVE CORONARY ARTERY OF NATIVE HEART WITH OTHER FORM OF ANGINA PECTORIS: ICD-10-CM

## 2024-11-27 DIAGNOSIS — I35.1 MODERATE AORTIC REGURGITATION: ICD-10-CM

## 2024-11-27 PROCEDURE — 3074F SYST BP LT 130 MM HG: CPT | Performed by: STUDENT IN AN ORGANIZED HEALTH CARE EDUCATION/TRAINING PROGRAM

## 2024-11-27 PROCEDURE — 3078F DIAST BP <80 MM HG: CPT | Performed by: STUDENT IN AN ORGANIZED HEALTH CARE EDUCATION/TRAINING PROGRAM

## 2024-11-27 PROCEDURE — 1159F MED LIST DOCD IN RCRD: CPT | Performed by: STUDENT IN AN ORGANIZED HEALTH CARE EDUCATION/TRAINING PROGRAM

## 2024-11-27 PROCEDURE — 4004F PT TOBACCO SCREEN RCVD TLK: CPT | Performed by: STUDENT IN AN ORGANIZED HEALTH CARE EDUCATION/TRAINING PROGRAM

## 2024-11-27 PROCEDURE — 3008F BODY MASS INDEX DOCD: CPT | Performed by: STUDENT IN AN ORGANIZED HEALTH CARE EDUCATION/TRAINING PROGRAM

## 2024-11-27 PROCEDURE — 1157F ADVNC CARE PLAN IN RCRD: CPT | Performed by: STUDENT IN AN ORGANIZED HEALTH CARE EDUCATION/TRAINING PROGRAM

## 2024-11-27 PROCEDURE — 99214 OFFICE O/P EST MOD 30 MIN: CPT | Performed by: STUDENT IN AN ORGANIZED HEALTH CARE EDUCATION/TRAINING PROGRAM

## 2024-11-27 NOTE — PROGRESS NOTES
Chief complaint:   Chief Complaint   Patient presents with    Follow-up     CT        History of Present Illness  Horacio Gupta is a 64 y.o. year old male patient with history of CAD status post PCI to RCA in , cardiomyopathy with LVEF of 34% status post ICD, TAA (4.4 cm) with mild AI, hypertension, ISAAC who is presenting for follow up of thoracic aortic aneurysm.     CTA in 2021 showed stable mid ascending aortic aneurysm with maximal diameter of 4.4 x 4.1 cm, stable compared to imaging in 2017.     CT without contrast 2023 showed stable ascending aortic aneurysm size of 4.5 cm.    Most recent CT this 2024 reviewed, showed stable TAA at 4.3 cm     Most recent echo showed LVEF 35-40% and mild AI.      Doing well, no complaints today. Denies chest pain, shortness of breath, palpitations or syncope       Brother  from heart disease in his 60s.     Social History     Tobacco Use    Smoking status: Every Day     Current packs/day: 0.25     Types: Cigarettes     Passive exposure: Current    Smokeless tobacco: Never   Vaping Use    Vaping status: Never Used   Substance Use Topics    Alcohol use: Not Currently    Drug use: Never       Outpatient Medications:  Current Outpatient Medications   Medication Instructions    Advair -21 mcg/actuation inhaler 2 puffs, 2 times daily RT    albuterol 90 mcg/actuation inhaler 2 puffs, inhalation, Every 6 hours, As directed    albuterol 2.5 mg, Every 6 hours PRN    aspirin 81 mg capsule 1 tablet, Daily    atorvastatin (LIPITOR) 80 mg, oral, Nightly    bumetanide (BUMEX) 1 mg, oral, Daily PRN    calcium carbonate-vitamin D3 600 mg-5 mcg (200 unit) tablet 1 tablet, Daily    cetirizine (ZyrTEC) 10 mg tablet 1 tablet, Daily    cholecalciferol (Vitamin D-3) 125 MCG (5000 UT) capsule 1 capsule, Every other day    clopidogrel (PLAVIX) 75 mg, oral, Daily    diclofenac sodium (Voltaren) 1 % gel APPLY 1 APPLICATION TOPICALLY 4 TIMES DAILY    docusate  sodium (COLACE) 100 mg, 2 times daily    fexofenadine (ALLEGRA) 180 mg, Once as needed    gabapentin (NEURONTIN) 300 mg, 3 times daily    guaiFENesin (Mucinex) 600 mg 12 hr tablet 1 tablet, As needed    lactobacillus acidophilus (acidophilus-pectin, citrus) tablet tablet 2 tablets, oral, Daily    lisinopril 5 mg, oral, Daily    LORazepam (Ativan) 2 mg tablet Take 1 p.o. 1 hour before the procedure    magnesium oxide (Mag-Ox) 400 mg (241.3 mg magnesium) tablet 1 tablet, Daily    metoprolol succinate XL (TOPROL-XL) 100 mg, oral, Daily    multivitamin tablet 1 tablet, Daily    nitroglycerin (NITROSTAT) 0.4 mg, sublingual, As needed    omeprazole (PRILOSEC) 20 mg, oral, 2 times daily before meals    polyethylene glycol (GLYCOLAX, MIRALAX) 17 g, As needed    Remicade 100 mg    spironolactone (ALDACTONE) 25 mg, oral, Daily    tiotropium (Spiriva with HandiHaler) 18 mcg inhalation capsule 1 capsule, Daily    triamcinolone (Kenalog) 0.5 % cream Topical, 2 times daily         Vitals:  Vitals:    11/27/24 1025   BP: 112/66   Pulse: 67   SpO2: 94%       Physical Exam:  General: NAD, well-appearing  HEENT: moist mucous membranes, no jaundice  Neck: No JVD, no carotid bruit  Lungs: CTA olga, no wheezing or rales  Cardiac: RRR, no murmurs  Abdomen: soft, non-tender, non-distended  Extremities: 2+ radial pulses, no edema, no wounds  Skin: warm, dry  Neurologic: AAOx3,  no focal deficits    Assessment/Plan     # TAA  -Stable TAA 4.3-4.4 cm, follow up surveillance CT scan in 1-2 years.  Patient reports he has already yearly scans scheduled for follow-up of his lung nodules with his pulmonologist.  This can be followed  -Prior echo with mild aorta regurgitation  -Normal size abdominal aorta on US. Carotid US with minimal plaque   -Alarm signs reviewed with patient        Elvia Montaño MD [facility]  Interventional Cardiology  Endovascular Interventions  [email or web address]    **Disclaimer: This note  was dictated by speech recognition, and every effort has been made to prevent any error in transcription, however minor errors may be present**

## 2024-12-11 ENCOUNTER — APPOINTMENT (OUTPATIENT)
Dept: NEUROLOGY | Facility: CLINIC | Age: 64
End: 2024-12-11
Payer: MEDICARE

## 2024-12-11 VITALS
WEIGHT: 163 LBS | BODY MASS INDEX: 28.87 KG/M2 | DIASTOLIC BLOOD PRESSURE: 78 MMHG | HEART RATE: 70 BPM | SYSTOLIC BLOOD PRESSURE: 134 MMHG

## 2024-12-11 DIAGNOSIS — R25.1 TREMOR: Primary | ICD-10-CM

## 2024-12-11 PROCEDURE — 99215 OFFICE O/P EST HI 40 MIN: CPT | Performed by: STUDENT IN AN ORGANIZED HEALTH CARE EDUCATION/TRAINING PROGRAM

## 2024-12-11 PROCEDURE — 1126F AMNT PAIN NOTED NONE PRSNT: CPT | Performed by: STUDENT IN AN ORGANIZED HEALTH CARE EDUCATION/TRAINING PROGRAM

## 2024-12-11 PROCEDURE — 3075F SYST BP GE 130 - 139MM HG: CPT | Performed by: STUDENT IN AN ORGANIZED HEALTH CARE EDUCATION/TRAINING PROGRAM

## 2024-12-11 PROCEDURE — 1157F ADVNC CARE PLAN IN RCRD: CPT | Performed by: STUDENT IN AN ORGANIZED HEALTH CARE EDUCATION/TRAINING PROGRAM

## 2024-12-11 PROCEDURE — 3078F DIAST BP <80 MM HG: CPT | Performed by: STUDENT IN AN ORGANIZED HEALTH CARE EDUCATION/TRAINING PROGRAM

## 2024-12-11 PROCEDURE — 1159F MED LIST DOCD IN RCRD: CPT | Performed by: STUDENT IN AN ORGANIZED HEALTH CARE EDUCATION/TRAINING PROGRAM

## 2024-12-11 ASSESSMENT — PAIN SCALES - GENERAL: PAINLEVEL_OUTOF10: 0-NO PAIN

## 2024-12-13 ENCOUNTER — APPOINTMENT (OUTPATIENT)
Dept: PAIN MEDICINE | Facility: CLINIC | Age: 64
End: 2024-12-13
Payer: MEDICARE

## 2024-12-13 VITALS
SYSTOLIC BLOOD PRESSURE: 100 MMHG | OXYGEN SATURATION: 94 % | WEIGHT: 163 LBS | BODY MASS INDEX: 28.88 KG/M2 | RESPIRATION RATE: 16 BRPM | DIASTOLIC BLOOD PRESSURE: 62 MMHG | HEIGHT: 63 IN | HEART RATE: 65 BPM | TEMPERATURE: 97.2 F

## 2024-12-13 DIAGNOSIS — M45.0 ANKYLOSING SPONDYLITIS OF MULTIPLE SITES IN SPINE (MULTI): Primary | ICD-10-CM

## 2024-12-13 DIAGNOSIS — G89.29 CHRONIC LOW BACK PAIN WITHOUT SCIATICA, UNSPECIFIED BACK PAIN LATERALITY: ICD-10-CM

## 2024-12-13 DIAGNOSIS — M54.50 CHRONIC LOW BACK PAIN WITHOUT SCIATICA, UNSPECIFIED BACK PAIN LATERALITY: ICD-10-CM

## 2024-12-13 PROCEDURE — 99214 OFFICE O/P EST MOD 30 MIN: CPT | Performed by: NURSE PRACTITIONER

## 2024-12-13 ASSESSMENT — PAIN DESCRIPTION - DESCRIPTORS: DESCRIPTORS: ACHING

## 2024-12-13 ASSESSMENT — PAIN - FUNCTIONAL ASSESSMENT: PAIN_FUNCTIONAL_ASSESSMENT: 0-10

## 2024-12-13 ASSESSMENT — ENCOUNTER SYMPTOMS
SHORTNESS OF BREATH: 0
BACK PAIN: 1
OCCASIONAL FEELINGS OF UNSTEADINESS: 0
WHEEZING: 0
CHILLS: 0
FREQUENCY: 0
CONFUSION: 0
NECK PAIN: 1
DEPRESSION: 0
NUMBNESS: 0
NAUSEA: 0
DIARRHEA: 1
WEAKNESS: 0
LOSS OF SENSATION IN FEET: 0
CONSTIPATION: 0
PALPITATIONS: 0
MYALGIAS: 1
FATIGUE: 0
AGITATION: 0
CHEST TIGHTNESS: 0
HEADACHES: 0

## 2024-12-13 ASSESSMENT — PAIN SCALES - GENERAL
PAINLEVEL_OUTOF10: 5 - MODERATE PAIN
PAINLEVEL_OUTOF10: 5

## 2024-12-13 NOTE — PROGRESS NOTES
Chief Complain  Follow-up for lower back pain, radicular symptoms improved    History Of Present Illness  Horacio Gupta is a 64 y.o. male here for lower back pain . The patient rates the pain at 5 on a scale from 0-10.  The patient describes pain as dull, aching.  The pain is worsened by bending forward, standing, and walking and is alleviated by medications nonsteroidal anti-inflammatory drugs, Tylenol, and prescribed pain medications, position change, cortisone injections, sitting, and lying down.  Since the last visit the pain has improved.    The patient denies any fever, chills, weight loss, weakness, bladder/ bowel incontinence, history of cancer, history of IV drug abuse, recent trauma.     Prior office visit:  4/2/2020 for Dr. Ansari  This is 63 y.o.  male with PMH of smoker with depression, hypertension COPD, L5 compression fracture, ankylosing spondylitis, Ulcerative colitis on Remicade infusion, CAD with stents, congestive heart failure with ICD implant on Plavix, EMG on 6/13/2023 by Sinai Koenig showed severe L5 radiculopathy with active denervation on gabapentin 600 mg 3 times daily who received caudal NYASIA on 2/5/2024,  who is here for follow-up stating that the injection has helped him by 100% the pain is just now starting to come back mildly.  The patient is here and wants to have another injection before it gets bad.  I placed another order for another injection for him and I told him he does not need to have it done for couple months.  He can schedule it for later.  The patient did very well with aquatic therapy I ordered another aquatic therapy for him.  Unfortunately despite the fact he is Medicare but his insurance does not cover for his Silver sneaker which something he benefited greatly from.     Procedures:  1/4/2024 follow NYASIA the patient has had a 90% improvement in pain and function  2/5/2024 caudal NYASIA the patient has had a 80% improvement in pain and function  4/18/2023 caudal NYASIA  the patient has had a 100% improvement in pain and function      Portions of record reviewed for pertinent issues: active problem list, medication list, allergies, family history, social history, notes from last encounter, encounters, lab results, imaging and other available records.      I have personally reviewed the OARRS report for this patient. This report is scanned into the electronic medical record. I have considered the risks of abuse, dependence, addiction and diversion. It showed: Gabapentin from Dr. Ansari  OPIOID RISK ASSESSMENT SCORE 0/26  Aberrant behavior: None    Past Medical History  He has a past medical history of Ankylosing spondylitis of unspecified sites in spine (Multi) (06/08/2022), Aortic aneurysm of unspecified site, without rupture (CMS-[facility]) (04/26/2022), Atherosclerotic heart disease of native coronary artery without angina pectoris (07/27/2022), Chronic obstructive pulmonary disease, unspecified (10/11/2022), Essential (primary) hypertension (10/11/2022), Gastro-esophageal reflux disease without esophagitis (10/11/2022), Noninfective gastroenteritis and colitis, unspecified (06/08/2022), Obstructive sleep apnea (adult) (pediatric) (10/27/2020), Other idiopathic scoliosis, site unspecified (08/17/2020), Pure hypercholesterolemia, unspecified (06/08/2022), Ulcerative colitis, unspecified, without complications (Multi) (10/11/2022), Unilateral inguinal hernia, without obstruction or gangrene, not specified as recurrent (12/10/2019), Unilateral primary osteoarthritis, right hip (03/05/2020), Unspecified systolic (congestive) heart failure (10/11/2022), Ventral hernia without obstruction or gangrene (11/25/2019), and Vitamin D deficiency, unspecified (10/11/2022).    Surgical History  He has a past surgical history that includes Coronary angioplasty with stent (01/20/2017); Other surgical history (07/19/2021); Other surgical history (12/10/2019); Esophagogastroduodenoscopy; Colonoscopy;  Cardiac defibrillator placement; Ankle surgery; and Total hip arthroplasty (Left).     Social History  He reports that he has been smoking cigarettes. He has been exposed to tobacco smoke. He has never used smokeless tobacco. He reports that he does not currently use alcohol. He reports that he does not use drugs.    Family History  Family History   Problem Relation Name Age of Onset    Stroke Mother      Diabetes Mother      Heart failure Mother      Hypertension Mother      Heart failure Father      Hypertension Father      Diabetes Brother      Other (C A B G x1) Brother          Allergies  Empagliflozin, Penicillins, Ranolazine, Sacubitril-valsartan, and Sulfamethoxazole    Review of Systems  Review of Systems   Constitutional:  Negative for chills and fatigue.   Respiratory:  Negative for chest tightness, shortness of breath and wheezing.    Cardiovascular:  Negative for chest pain and palpitations.        AICD and permanent pacemaker   Gastrointestinal:  Positive for diarrhea. Negative for constipation and nausea.        History of ulcerative colitis   Genitourinary:  Negative for frequency and urgency.   Musculoskeletal:  Positive for back pain, myalgias and neck pain.        History of ankylosing spondylosis, lower back pain, lumbar radicular symptoms improved after injection.   Neurological:  Negative for weakness, numbness and headaches.   Psychiatric/Behavioral:  Negative for agitation, confusion and suicidal ideas.         Physical Exam  Physical Exam  Constitutional:       Appearance: Normal appearance.          Comments: Lumbar severely decreased range of motion flexion extension, lateral bending, pain on palpation to bilateral lumbar paraspinals, lower extremity slump test negative,   HENT:      Head: Normocephalic.   Eyes:      Extraocular Movements: Extraocular movements intact.   Musculoskeletal:      Lumbar back: Spasms and tenderness present. Decreased range of motion.   Neurological:       "General: No focal deficit present.      Mental Status: He is alert and oriented to person, place, and time.      GCS: GCS eye subscore is 4. GCS verbal subscore is 5. GCS motor subscore is 6.      Cranial Nerves: Cranial nerves 2-12 are intact.      Sensory: Sensation is intact.      Motor: Motor function is intact.      Coordination: Coordination is intact.      Gait: Gait is intact.      Deep Tendon Reflexes:      Reflex Scores:       Patellar reflexes are 3+ on the right side and 3+ on the left side.       Achilles reflexes are 3+ on the right side and 3+ on the left side.  Psychiatric:         Attention and Perception: Attention and perception normal.         Mood and Affect: Mood normal.         Speech: Speech normal.         Behavior: Behavior normal.         Thought Content: Thought content normal.         Judgment: Judgment normal.           Last Recorded Vitals  Blood pressure 100/62, pulse 65, temperature 36.2 °C (97.2 °F), temperature source Tympanic, resp. rate 16, height 1.6 m (5' 3\"), weight 73.9 kg (163 lb), SpO2 94%.    Reviewed Images  2/6/2023 lumbar x-ray showed severe degenerative changes with ankylosing spondylitis most likely with facet joint disease and osteopenia     2/3/2023 CT thoracic spine  IMPRESSION:  1. No acute fracture of the thoracic spine. Remote right L1  transverse process mildly displaced fracture.  2. Moderate multilevel degenerative changes most prominent at T10-T11  with superimposed findings of DISH.  3. Emphysema within the imaged lung fields.  4. 4.4 cm ascending thoracic aorta aneurysm.     :  2/3/2023 CT lumbar spine and thoracic spine showed:  FINDINGS:  OSSEOUS STRUCTURES:  There is a mildly distracted transverse fracture identified through  the mid to superior aspect of the L5 vertebral body. A minimally  displaced transverse fracture is seen through the right transverse  process at L4 no additional fractures are seen. The vertebral bodies  are well aligned without " evidence of subluxation.     Mild disc space narrowing and bridging osteophyte formation is seen  throughout the visualized thoracolumbar spine. Moderate to severe  facet degenerative changes are seen throughout the lumbar spine.     LOWER THORACIC SPINE:  No gross central canal or neural foraminal narrowing is seen.     T12-L1:  No gross central canal or neural foraminal narrowing is seen.     L1-2:  No gross central canal or neural foraminal narrowing is seen.     L2-3:  No gross central canal or neural foraminal narrowing is seen.     L3-4:  No gross central canal or neural foraminal narrowing is seen.     L4-5:  At least mild central canal narrowing is seen. No significant neural  foraminal narrowing is seen.     L5-S1:  Mild neural foraminal narrowing is seen bilaterally. No significant  central canal narrowing is seen.     ASSOCIATED STRUCTURES:  Evaluation of the visualized soft tissues of the abdomen is limited  by the lack of intravenous contrast. Within this limitation,  heterogeneous increased density is seen throughout the mid to  inferior aspect of the retroperitoneum, most consistent with  hemorrhage.     IMPRESSION:  1. L5 vertebral body fracture, as above.  2. Right L4 transverse process fracture.  3. Degenerative changes, as described above.    Reviewed Labs  Lab Results   Component Value Date    GLUCOSE 100 (H) 10/21/2024    CALCIUM 9.2 10/21/2024     10/21/2024    K 4.3 10/21/2024    CO2 34 (H) 10/21/2024     10/21/2024    BUN 17 10/21/2024    CREATININE 0.84 10/21/2024         Assessment/Plan     Horacio Gupta is a very pleasant 64 y.o. male recalled past medical history of smoker with depression, hypertension COPD, L5 compression fracture, ankylosing spondylitis, Ulcerative colitis on Remicade infusion, CAD with stents, congestive heart failure with ICD implant on Plavix, EMG on 6/13/2023 by Sinai Koenig showed severe L5 radiculopathy with active denervation, who is here for  follow-up of caudal epidural steroid injection for lumbar radiculopathy.  Caudal epidural injection has provided 90% relief of symptoms and allowed him to increase his ADLs and walk without pain.  He is managing his pain with gabapentin however he reports taking it as needed.  Had a in-depth conversation about taking medication as prescribed, encourage patient to start taking 300 mg at bedtime and slowly titrate up according to symptoms and symptom relief.  Wife and patient verbalized understanding plan of care.  He takes the occasional Tylenol which provides significant relief of symptoms.  He denies any new neurological or constitutional symptoms.  On physical exam severely decreased cervical and lumbar range of motion, bilateral lumbar paraspinals tender to palpation, unable to reproduce any radiating radicular symptoms.  Bilateral lower extremity vibratory sensation reflex intact, denies bowel or bladder incontinence or saddle paresthesia.  Plan to repeat fluoroscopy guided caudal epidural steroid injection if lumbar radicular symptoms return.  Plan to continue with gabapentin as prescribed.  Encourage patient to increase activity levels as tolerated.  Follow-up in 3 months or sooner if needed.  Plan  At least 50% of the visit was involved in the discussion of the options for treatment. We discussed exercises, medication, interventional therapies and surgery. Healthy life style is essential with patient hard work to achieve the wellness. In addition; discussion with the patient and/or family about any of the diagnostic results, impressions and/or recommended diagnostic studies, prognosis, risks and benefits of treatment options, instructions for treatment and/or follow-up, importance of compliance with chosen treatment options, risk-factor reduction, and patient/family education.     Continue with gabapentin 300 mg at night and titrate up as prescribed.  Continue self-directed physical therapy  Patient  instructed to call if lumbar radicular symptoms return to repeat fluoroscopy guided caudal NYASIA.  Strongly encouraged smoking cessation  Healthy lifestyle and anti-inflammatory diet in addition to weight control discussed with the patient  Alternative chronic pain therapies was discussed, encouraged and information was handed  Return to Clinic 3 months or sooner if needed     *Please note this report has been produced using speech recognition software and may contain errors related to that system including grammar, punctuation and spelling as well as words and phrases that may be inappropriate. If there are questions or concerns, please feel free to contact me to clarify.      I spent 35 minutes in the professional and overall care of this patient.       Live Parks, APRN-CNP

## 2024-12-26 ENCOUNTER — HOSPITAL ENCOUNTER (OUTPATIENT)
Dept: CARDIOLOGY | Facility: CLINIC | Age: 64
Discharge: HOME | End: 2024-12-26
Payer: MEDICARE

## 2024-12-26 DIAGNOSIS — Z95.810 PRESENCE OF AUTOMATIC CARDIOVERTER/DEFIBRILLATOR (AICD): ICD-10-CM

## 2024-12-26 DIAGNOSIS — I50.20 SYSTOLIC HEART FAILURE, ACC/AHA STAGE C: ICD-10-CM

## 2024-12-26 DIAGNOSIS — I47.20 VT (VENTRICULAR TACHYCARDIA) (MULTI): ICD-10-CM

## 2024-12-26 PROCEDURE — 93296 REM INTERROG EVL PM/IDS: CPT

## 2024-12-27 ENCOUNTER — APPOINTMENT (OUTPATIENT)
Dept: PAIN MEDICINE | Facility: CLINIC | Age: 64
End: 2024-12-27
Payer: MEDICARE

## 2024-12-27 RX ORDER — SPIRONOLACTONE 25 MG/1
25 TABLET ORAL DAILY
Qty: 30 TABLET | Refills: 11 | Status: SHIPPED | OUTPATIENT
Start: 2024-12-27

## 2025-01-11 DIAGNOSIS — I50.20 SYSTOLIC HEART FAILURE, ACC/AHA STAGE C: ICD-10-CM

## 2025-01-14 RX ORDER — METOPROLOL SUCCINATE 100 MG/1
100 TABLET, EXTENDED RELEASE ORAL DAILY
Qty: 90 TABLET | Refills: 3 | Status: SHIPPED | OUTPATIENT
Start: 2025-01-14

## 2025-02-11 ENCOUNTER — CLINICAL SUPPORT (OUTPATIENT)
Dept: CARDIAC REHAB | Facility: HOSPITAL | Age: 65
End: 2025-02-11
Payer: MEDICARE

## 2025-02-11 ENCOUNTER — HOSPITAL ENCOUNTER (OUTPATIENT)
Dept: CARDIOLOGY | Facility: HOSPITAL | Age: 65
Discharge: HOME | End: 2025-02-11
Payer: MEDICARE

## 2025-02-11 VITALS
DIASTOLIC BLOOD PRESSURE: 75 MMHG | RESPIRATION RATE: 18 BRPM | OXYGEN SATURATION: 93 % | WEIGHT: 151.8 LBS | HEART RATE: 69 BPM | SYSTOLIC BLOOD PRESSURE: 111 MMHG | BODY MASS INDEX: 26.89 KG/M2

## 2025-02-11 DIAGNOSIS — I50.20 NYHA CLASS 3 HEART FAILURE WITH REDUCED EJECTION FRACTION: ICD-10-CM

## 2025-02-11 DIAGNOSIS — I47.20 VT (VENTRICULAR TACHYCARDIA) (MULTI): Primary | ICD-10-CM

## 2025-02-11 DIAGNOSIS — Z95.810 PRESENCE OF AUTOMATIC CARDIOVERTER/DEFIBRILLATOR (AICD): ICD-10-CM

## 2025-02-11 PROCEDURE — 99212 OFFICE O/P EST SF 10 MIN: CPT

## 2025-02-11 PROCEDURE — 93283 PRGRMG EVAL IMPLANTABLE DFB: CPT

## 2025-02-11 ASSESSMENT — PAIN - FUNCTIONAL ASSESSMENT: PAIN_FUNCTIONAL_ASSESSMENT: 0-10

## 2025-02-11 ASSESSMENT — PAIN SCALES - GENERAL: PAINLEVEL_OUTOF10: 0 - NO PAIN

## 2025-02-11 NOTE — PROGRESS NOTES
"Horacio arrived ambulatory to the [facility] for his scheduled visit. Horacio states he is feeling \"pretty good \". Horacio reports that his Lisinopril decreased Denies increased shortness of breath, PND, or Orthopnea. Uses CPAP nightly. Eating and sleeping without distress. Home medications reviewed. The plan for Candy is to continue current medication regimen and follow up in 6 months. Reviewed signs and symptoms of increased heart failure and when to call for help. Patient verbalizes understanding for: Low sodium diet: 2000 mg daily of sodium, follow-up appointment with [facility], weighing self daily, medications dose and schedule, and signs of increased heart failure.      Vitals:  BP: 111/75           HR: 69           Resp: 18          SPO2: 93% on room air       Weight:151.8lbs                          Previous Weight: 152.3 lbs        Lung Sounds: diminished    Edema: none    JVD: absent     Labs: OP labs    Medications Administered: none    Next Appointment Date: August 12 2025@ 1pm    Note in EMR for treating Physician: Christophe Gill CNP    In-House Supervising Physician: Dr. Marte  "

## 2025-02-11 NOTE — PATIENT INSTRUCTIONS
Continue medications at same dose    Diet  2000 mg (2 gram) sodium diet-Do not add salt to foods or cook with salt. Check labels for Sodium (Na)     Resources  [facility] [phone] Monday - Friday 7:00 am - 3:00 pm  Websites: [email or web address]; American Heart Association: www.heart.org    Special Instructions:  If you smoke-Quit!  If you are not able to contact your doctor and need immediate medical attention, call 911  If you are not able to keep you're scheduled appointment at the [facility], call [phone]  Watch for and report to your doctor all warning signs of Heart Failure (increased difficulty with breathing, 3-5 pound weight gain in one week or less, increased swelling, increased tiredness)  Weigh yourself each day at the same time with the same amount of clothes on. Record your weight log. Bring it with your to each visit

## 2025-02-28 DIAGNOSIS — E78.00 HYPERCHOLESTEROLEMIA: ICD-10-CM

## 2025-02-28 RX ORDER — ATORVASTATIN CALCIUM 80 MG/1
80 TABLET, FILM COATED ORAL NIGHTLY
Qty: 90 TABLET | Refills: 3 | Status: SHIPPED | OUTPATIENT
Start: 2025-02-28 | End: 2026-02-28

## 2025-02-28 NOTE — TELEPHONE ENCOUNTER
PATIENT'S WIFE CALLED FOR A REFILL ON ATORVASTATIN. IT LOOKS LIKE THIS IS BEING MANAGED BY HIS CARDIO AND THERE IS ALREADY A MED PROPOSAL PENDING.

## 2025-03-03 RX ORDER — ATORVASTATIN CALCIUM 80 MG/1
80 TABLET, FILM COATED ORAL NIGHTLY
Qty: 90 TABLET | Refills: 0 | OUTPATIENT
Start: 2025-03-03

## 2025-03-04 LAB
25(OH)D3+25(OH)D2 SERPL-MCNC: 73 NG/ML (ref 30–100)
ALBUMIN SERPL-MCNC: 4.5 G/DL (ref 3.6–5.1)
ALP SERPL-CCNC: 51 U/L (ref 35–144)
ALT SERPL-CCNC: 20 U/L (ref 9–46)
ANION GAP SERPL CALCULATED.4IONS-SCNC: 8 MMOL/L (CALC) (ref 7–17)
AST SERPL-CCNC: 26 U/L (ref 10–35)
BASOPHILS # BLD AUTO: 32 CELLS/UL (ref 0–200)
BASOPHILS NFR BLD AUTO: 0.5 %
BILIRUB SERPL-MCNC: 0.6 MG/DL (ref 0.2–1.2)
BUN SERPL-MCNC: 16 MG/DL (ref 7–25)
CALCIUM SERPL-MCNC: 9.6 MG/DL (ref 8.6–10.3)
CHLORIDE SERPL-SCNC: 99 MMOL/L (ref 98–110)
CHOLEST SERPL-MCNC: 164 MG/DL
CHOLEST/HDLC SERPL: 2.7 (CALC)
CO2 SERPL-SCNC: 32 MMOL/L (ref 20–32)
CREAT SERPL-MCNC: 0.83 MG/DL (ref 0.7–1.35)
EGFRCR SERPLBLD CKD-EPI 2021: 97 ML/MIN/1.73M2
EOSINOPHIL # BLD AUTO: 120 CELLS/UL (ref 15–500)
EOSINOPHIL NFR BLD AUTO: 1.9 %
ERYTHROCYTE [DISTWIDTH] IN BLOOD BY AUTOMATED COUNT: 12.7 % (ref 11–15)
EST. AVERAGE GLUCOSE BLD GHB EST-MCNC: 128 MG/DL
EST. AVERAGE GLUCOSE BLD GHB EST-SCNC: 7.1 MMOL/L
GLUCOSE SERPL-MCNC: 89 MG/DL (ref 65–99)
HBA1C MFR BLD: 6.1 % OF TOTAL HGB
HCT VFR BLD AUTO: 48.9 % (ref 38.5–50)
HDLC SERPL-MCNC: 60 MG/DL
HGB BLD-MCNC: 16.4 G/DL (ref 13.2–17.1)
LDLC SERPL CALC-MCNC: 79 MG/DL (CALC)
LYMPHOCYTES # BLD AUTO: 2003 CELLS/UL (ref 850–3900)
LYMPHOCYTES NFR BLD AUTO: 31.8 %
MCH RBC QN AUTO: 32.2 PG (ref 27–33)
MCHC RBC AUTO-ENTMCNC: 33.5 G/DL (ref 32–36)
MCV RBC AUTO: 96.1 FL (ref 80–100)
MONOCYTES # BLD AUTO: 517 CELLS/UL (ref 200–950)
MONOCYTES NFR BLD AUTO: 8.2 %
NEUTROPHILS # BLD AUTO: 3629 CELLS/UL (ref 1500–7800)
NEUTROPHILS NFR BLD AUTO: 57.6 %
NONHDLC SERPL-MCNC: 104 MG/DL (CALC)
PLATELET # BLD AUTO: 276 THOUSAND/UL (ref 140–400)
PMV BLD REES-ECKER: 10.4 FL (ref 7.5–12.5)
POTASSIUM SERPL-SCNC: 4.1 MMOL/L (ref 3.5–5.3)
PROT SERPL-MCNC: 6.7 G/DL (ref 6.1–8.1)
PSA SERPL-MCNC: 0.87 NG/ML
RBC # BLD AUTO: 5.09 MILLION/UL (ref 4.2–5.8)
SODIUM SERPL-SCNC: 139 MMOL/L (ref 135–146)
TRIGL SERPL-MCNC: 156 MG/DL
WBC # BLD AUTO: 6.3 THOUSAND/UL (ref 3.8–10.8)

## 2025-03-11 ENCOUNTER — APPOINTMENT (OUTPATIENT)
Dept: PRIMARY CARE | Facility: CLINIC | Age: 65
End: 2025-03-11
Payer: MEDICARE

## 2025-03-11 VITALS
BODY MASS INDEX: 26.93 KG/M2 | SYSTOLIC BLOOD PRESSURE: 123 MMHG | HEIGHT: 63 IN | DIASTOLIC BLOOD PRESSURE: 78 MMHG | WEIGHT: 152 LBS | HEART RATE: 74 BPM

## 2025-03-11 DIAGNOSIS — E55.9 VITAMIN D DEFICIENCY: ICD-10-CM

## 2025-03-11 DIAGNOSIS — R73.01 IFG (IMPAIRED FASTING GLUCOSE): ICD-10-CM

## 2025-03-11 DIAGNOSIS — K51.90 ULCERATIVE COLITIS WITHOUT COMPLICATIONS, UNSPECIFIED LOCATION (MULTI): ICD-10-CM

## 2025-03-11 DIAGNOSIS — J98.2 INTERSTITIAL EMPHYSEMA (MULTI): ICD-10-CM

## 2025-03-11 DIAGNOSIS — M45.9 ANKYLOSING SPONDYLITIS OF UNSPECIFIED SITES IN SPINE (MULTI): ICD-10-CM

## 2025-03-11 DIAGNOSIS — I10 HTN (HYPERTENSION), BENIGN: Primary | ICD-10-CM

## 2025-03-11 DIAGNOSIS — D49.0 IPMN (INTRADUCTAL PAPILLARY MUCINOUS NEOPLASM): ICD-10-CM

## 2025-03-11 PROCEDURE — 1159F MED LIST DOCD IN RCRD: CPT | Performed by: INTERNAL MEDICINE

## 2025-03-11 PROCEDURE — 3078F DIAST BP <80 MM HG: CPT | Performed by: INTERNAL MEDICINE

## 2025-03-11 PROCEDURE — 99214 OFFICE O/P EST MOD 30 MIN: CPT | Performed by: INTERNAL MEDICINE

## 2025-03-11 PROCEDURE — 1158F ADVNC CARE PLAN TLK DOCD: CPT | Performed by: INTERNAL MEDICINE

## 2025-03-11 PROCEDURE — 3008F BODY MASS INDEX DOCD: CPT | Performed by: INTERNAL MEDICINE

## 2025-03-11 PROCEDURE — 1160F RVW MEDS BY RX/DR IN RCRD: CPT | Performed by: INTERNAL MEDICINE

## 2025-03-11 PROCEDURE — 1123F ACP DISCUSS/DSCN MKR DOCD: CPT | Performed by: INTERNAL MEDICINE

## 2025-03-11 PROCEDURE — 3074F SYST BP LT 130 MM HG: CPT | Performed by: INTERNAL MEDICINE

## 2025-03-11 PROCEDURE — 1157F ADVNC CARE PLAN IN RCRD: CPT | Performed by: INTERNAL MEDICINE

## 2025-03-11 PROCEDURE — 4004F PT TOBACCO SCREEN RCVD TLK: CPT | Performed by: INTERNAL MEDICINE

## 2025-03-11 ASSESSMENT — ENCOUNTER SYMPTOMS
NEUROLOGICAL NEGATIVE: 1
ABDOMINAL PAIN: 0
LIGHT-HEADEDNESS: 0
EYE DISCHARGE: 0
ADENOPATHY: 0
CONFUSION: 0
VOMITING: 0
NUMBNESS: 0
NECK STIFFNESS: 0
CARDIOVASCULAR NEGATIVE: 1
ENDOCRINE NEGATIVE: 1
NAUSEA: 0
JOINT SWELLING: 0
ALLERGIC/IMMUNOLOGIC NEGATIVE: 1
BACK PAIN: 0
EYES NEGATIVE: 1
PSYCHIATRIC NEGATIVE: 1
DYSURIA: 0
AGITATION: 0
DIARRHEA: 0
CONSTIPATION: 0
SHORTNESS OF BREATH: 0
ABDOMINAL DISTENTION: 0
HEADACHES: 0
GASTROINTESTINAL NEGATIVE: 1
PALPITATIONS: 0
FEVER: 0
CONSTITUTIONAL NEGATIVE: 1
CHILLS: 0
WHEEZING: 0
ARTHRALGIAS: 1
COUGH: 0
RESPIRATORY NEGATIVE: 1
HEMATOLOGIC/LYMPHATIC NEGATIVE: 1

## 2025-03-11 NOTE — PROGRESS NOTES
Subjective   Patient ID: Horacio Gupta is a 64 y.o. male who presents for Follow-up (4 mo fu lab).  Here for fu with labs  Feels good        Review of Systems   Constitutional: Negative.  Negative for chills and fever.   HENT: Negative.  Negative for congestion.    Eyes: Negative.  Negative for discharge.   Respiratory: Negative.  Negative for cough, shortness of breath and wheezing.    Cardiovascular: Negative.  Negative for chest pain, palpitations and leg swelling.   Gastrointestinal: Negative.  Negative for abdominal distention, abdominal pain, constipation, diarrhea, nausea and vomiting.   Endocrine: Negative.    Genitourinary: Negative.  Negative for dysuria and urgency.   Musculoskeletal:  Positive for arthralgias. Negative for back pain, joint swelling and neck stiffness.   Skin: Negative.  Negative for rash.   Allergic/Immunologic: Negative.  Negative for immunocompromised state.   Neurological: Negative.  Negative for light-headedness, numbness and headaches.   Hematological: Negative.  Negative for adenopathy.   Psychiatric/Behavioral: Negative.  Negative for agitation, behavioral problems and confusion.    All other systems reviewed and are negative.      Objective   Physical Exam  Vitals reviewed.   Constitutional:       General: He is not in acute distress.     Appearance: Normal appearance.   HENT:      Head: Normocephalic and atraumatic.      Nose: Nose normal.   Eyes:      Conjunctiva/sclera: Conjunctivae normal.      Pupils: Pupils are equal, round, and reactive to light.   Neck:      Vascular: No carotid bruit.   Cardiovascular:      Rate and Rhythm: Normal rate and regular rhythm.      Pulses: Normal pulses.      Heart sounds:      No gallop.   Pulmonary:      Effort: Pulmonary effort is normal. No respiratory distress.      Breath sounds: No wheezing.      Comments: Diminished bs  Abdominal:      General: Bowel sounds are normal.      Palpations: Abdomen is soft.      Tenderness: There is  "no abdominal tenderness.   Musculoskeletal:         General: Normal range of motion.      Cervical back: Normal range of motion. No rigidity.   Lymphadenopathy:      Cervical: No cervical adenopathy.   Skin:     General: Skin is warm.      Findings: No rash.   Neurological:      General: No focal deficit present.      Mental Status: He is alert and oriented to person, place, and time.   Psychiatric:         Mood and Affect: Mood normal.         Behavior: Behavior normal.       /78 (BP Location: Left arm, Patient Position: Sitting)   Pulse 74   Ht 1.6 m (5' 3\")   Wt 68.9 kg (152 lb)   BMI 26.93 kg/m²    PSA, TOTAL   Date/Time Value Ref Range Status   03/03/2025 07:21 AM 0.87 < OR = 4.00 ng/mL Final     Comment:     The total PSA value from this assay system is   standardized against the WHO standard. The test   result will be approximately 20% lower when compared   to the equimolar-standardized total PSA (Doyle   Hale). Comparison of serial PSA results should be   interpreted with this fact in mind.     This test was performed using the Siemens   chemiluminescent method. Values obtained from   different assay methods cannot be used  interchangeably. PSA levels, regardless of  value, should not be interpreted as absolute  evidence of the presence or absence of disease.       HEMOGLOBIN A1c   Date/Time Value Ref Range Status   03/03/2025 07:21 AM 6.1 (H) <5.7 % of total Hgb Final     Comment:     For someone without known diabetes, a hemoglobin   A1c value between 5.7% and 6.4% is consistent with  prediabetes and should be confirmed with a   follow-up test.     For someone with known diabetes, a value <7%  indicates that their diabetes is well controlled. A1c  targets should be individualized based on duration of  diabetes, age, comorbid conditions, and other  considerations.     This assay result is consistent with an increased risk  of diabetes.     Currently, no consensus exists regarding use " of  hemoglobin A1c for diagnosis of diabetes for children.          Assessment/Plan   Problem List Items Addressed This Visit       HTN (hypertension), benign - Primary    Relevant Orders    CBC and Auto Differential    Comprehensive Metabolic Panel    Ulcerative colitis    Relevant Orders    CBC and Auto Differential    Comprehensive Metabolic Panel    Vitamin D deficiency    Relevant Orders    CBC and Auto Differential    Comprehensive Metabolic Panel    Vitamin D 25-Hydroxy,Total (for eval of Vitamin D levels)    IFG (impaired fasting glucose)    Relevant Orders    CBC and Auto Differential    Comprehensive Metabolic Panel    Hemoglobin A1C    IPMN (intraductal papillary mucinous neoplasm)    Relevant Orders    CT pancreas pre OP evaluation w IV contrast     Other Visit Diagnoses       Ankylosing spondylitis of unspecified sites in spine (Multi)        Relevant Orders    Sedimentation Rate    C-reactive protein    CBC and Auto Differential    Comprehensive Metabolic Panel    Interstitial emphysema (Multi)        Relevant Orders    CBC and Auto Differential    Comprehensive Metabolic Panel             Labs reviewed with pt    Diabetes Mellitus/IFG addressed as follow:    [address]  HGA1C GOAL LESS THAN 7  LOSE WT  EXERCISE DAILY      HTN addressed as follow:    MONITOR BP   GOAL BP LOWER THAN 130/80  LOW SALT  EXERCISE DAILY    CHF IS WELL COMPENSATED, CLINICALLY STABLE , CONTINUE SAME      COPD IS CLINICALLY STABLE, CONTINUE SAME      Afib stable    UC stable    AK stable        Down to 3 cig a day to stop    Pt has difficulty with MRI of pnacreas will change to ct    Fu 4 mo bw

## 2025-03-12 ENCOUNTER — OFFICE VISIT (OUTPATIENT)
Dept: PAIN MEDICINE | Facility: CLINIC | Age: 65
End: 2025-03-12
Payer: MEDICARE

## 2025-03-12 VITALS
HEART RATE: 72 BPM | OXYGEN SATURATION: 94 % | WEIGHT: 152 LBS | SYSTOLIC BLOOD PRESSURE: 113 MMHG | DIASTOLIC BLOOD PRESSURE: 71 MMHG | RESPIRATION RATE: 16 BRPM | BODY MASS INDEX: 26.93 KG/M2

## 2025-03-12 DIAGNOSIS — M47.812 CERVICAL SPONDYLOSIS: Primary | ICD-10-CM

## 2025-03-12 DIAGNOSIS — M45.0 ANKYLOSING SPONDYLITIS OF MULTIPLE SITES IN SPINE (MULTI): ICD-10-CM

## 2025-03-12 DIAGNOSIS — G89.29 CHRONIC BILATERAL LOW BACK PAIN WITHOUT SCIATICA: ICD-10-CM

## 2025-03-12 DIAGNOSIS — M54.50 CHRONIC BILATERAL LOW BACK PAIN WITHOUT SCIATICA: ICD-10-CM

## 2025-03-12 PROCEDURE — 99213 OFFICE O/P EST LOW 20 MIN: CPT | Performed by: NURSE PRACTITIONER

## 2025-03-12 PROCEDURE — 3074F SYST BP LT 130 MM HG: CPT | Performed by: NURSE PRACTITIONER

## 2025-03-12 PROCEDURE — 1125F AMNT PAIN NOTED PAIN PRSNT: CPT | Performed by: NURSE PRACTITIONER

## 2025-03-12 PROCEDURE — 1159F MED LIST DOCD IN RCRD: CPT | Performed by: NURSE PRACTITIONER

## 2025-03-12 PROCEDURE — 3078F DIAST BP <80 MM HG: CPT | Performed by: NURSE PRACTITIONER

## 2025-03-12 PROCEDURE — 1157F ADVNC CARE PLAN IN RCRD: CPT | Performed by: NURSE PRACTITIONER

## 2025-03-12 PROCEDURE — 4004F PT TOBACCO SCREEN RCVD TLK: CPT | Performed by: NURSE PRACTITIONER

## 2025-03-12 ASSESSMENT — ENCOUNTER SYMPTOMS
BACK PAIN: 1
WHEEZING: 0
DEPRESSION: 1
MYALGIAS: 1
CHILLS: 0
SHORTNESS OF BREATH: 0
CONSTIPATION: 0
PALPITATIONS: 0
NUMBNESS: 0
AGITATION: 0
LOSS OF SENSATION IN FEET: 0
OCCASIONAL FEELINGS OF UNSTEADINESS: 0
NAUSEA: 0
DIARRHEA: 0
FREQUENCY: 0
DIZZINESS: 0
CONFUSION: 0
FEVER: 0
CHEST TIGHTNESS: 0

## 2025-03-12 ASSESSMENT — PAIN - FUNCTIONAL ASSESSMENT: PAIN_FUNCTIONAL_ASSESSMENT: 0-10

## 2025-03-12 ASSESSMENT — PAIN SCALES - GENERAL
PAINLEVEL_OUTOF10: 4
PAINLEVEL_OUTOF10: 4

## 2025-03-12 ASSESSMENT — PAIN DESCRIPTION - DESCRIPTORS: DESCRIPTORS: THROBBING

## 2025-03-12 NOTE — PROGRESS NOTES
Chief Complain  Follow-up for chronic neck and lower back pain.    History Of Present Illness  Horacio Gupta is a 64 y.o. male here for neck and lower back pain. The patient rates the pain at 4 on a scale from 0-10.  The patient describes pain as dull, throbbing.  The pain is worsened by bending forward, standing, and walking and is alleviated by medications Tylenol and prescribed pain medications, position change, cortisone injections, sitting, and lying down.  Since the last visit the pain has improved.    The patient denies any fever, chills, weight loss, weakness, bladder/ bowel incontinence, history of cancer, history of IV drug abuse, recent trauma.     Prior office visit:  12/13/2024  Horacio Gupta is a very pleasant 64 y.o. male recalled past medical history of smoker with depression, hypertension COPD, L5 compression fracture, ankylosing spondylitis, Ulcerative colitis on Remicade infusion, CAD with stents, congestive heart failure with ICD implant on Plavix, EMG on 6/13/2023 by Sinai Koengi showed severe L5 radiculopathy with active denervation, who is here for follow-up of caudal epidural steroid injection for lumbar radiculopathy.  Caudal epidural injection has provided 90% relief of symptoms and allowed him to increase his ADLs and walk without pain.  He is managing his pain with gabapentin however he reports taking it as needed.  Had a in-depth conversation about taking medication as prescribed, encourage patient to start taking 300 mg at bedtime and slowly titrate up according to symptoms and symptom relief.  Wife and patient verbalized understanding plan of care.  He takes the occasional Tylenol which provides significant relief of symptoms.  He denies any new neurological or constitutional symptoms.  On physical exam severely decreased cervical and lumbar range of motion, bilateral lumbar paraspinals tender to palpation, unable to reproduce any radiating radicular symptoms.  Bilateral  lower extremity vibratory sensation reflex intact, denies bowel or bladder incontinence or saddle paresthesia.  Plan to repeat fluoroscopy guided caudal epidural steroid injection if lumbar radicular symptoms return.  Plan to continue with gabapentin as prescribed.  Encourage patient to increase activity levels as tolerated.  Follow-up in 3 months or sooner if needed.       Procedures:  1/4/2024 follow [facility] the patient has had a 90% improvement in pain and function  2/5/2024 caudal [facility] the patient has had a 80% improvement in pain and function  4/18/2023 caudal [facility] the patient has had a 100% improvement in pain and function  Portions of record reviewed for pertinent issues: active problem list, medication list, allergies, family history, social history, notes from last encounter, encounters, lab results, imaging and other available records.      I have personally reviewed the [facility] report for this patient. This report is scanned into the electronic medical record. I have considered the risks of abuse, dependence, addiction and diversion. It showed: : Patient on lorazepam and gabapentin from Dr. Ansari  OPIOID RISK ASSESSMENT SCORE 0/26  Aberrant behavior: Non    Past Medical History  He has a past medical history of Ankylosing spondylitis of unspecified sites in spine (Multi) (06/08/2022), Aortic aneurysm of unspecified site, without rupture ([facility] [place]) (04/26/2022), Atherosclerotic heart disease of native coronary artery without angina pectoris (07/27/2022), Chronic obstructive pulmonary disease, unspecified (10/11/2022), Essential (primary) hypertension (10/11/2022), Gastro-esophageal reflux disease without esophagitis (10/11/2022), Noninfective gastroenteritis and colitis, unspecified (06/08/2022), Obstructive sleep apnea (adult) (pediatric) (10/27/2020), Other idiopathic scoliosis, site unspecified (08/17/2020), Pure hypercholesterolemia, unspecified (06/08/2022), Ulcerative colitis, unspecified, without  complications (Multi) (10/11/2022), Unilateral inguinal hernia, without obstruction or gangrene, not specified as recurrent (12/10/2019), Unilateral primary osteoarthritis, right hip (03/05/2020), Unspecified systolic (congestive) heart failure (10/11/2022), Ventral hernia without obstruction or gangrene (11/25/2019), and Vitamin D deficiency, unspecified (10/11/2022).    Surgical History  He has a past surgical history that includes Coronary angioplasty with stent (01/20/2017); Other surgical history (07/19/2021); Other surgical history (12/10/2019); Esophagogastroduodenoscopy; Colonoscopy; Cardiac defibrillator placement; Ankle surgery; and Total hip arthroplasty (Left).     Social History  He reports that he has been smoking cigarettes. He has been exposed to tobacco smoke. He has never used smokeless tobacco. He reports that he does not currently use alcohol. He reports that he does not use drugs.    Family History  Family History   Problem Relation Name Age of Onset    Stroke Mother      Diabetes Mother      Heart failure Mother      Hypertension Mother      Heart failure Father      Hypertension Father      Diabetes Brother      Other (C A B G x1) Brother          Allergies  Empagliflozin, Penicillins, Ranolazine, Sacubitril-valsartan, and Sulfamethoxazole    Review of Systems  Review of Systems   Constitutional:  Negative for chills and fever.   Respiratory:  Negative for chest tightness, shortness of breath and wheezing.         10 cigarettes/day.  Slowly quitting   Cardiovascular:  Negative for chest pain and palpitations.   Gastrointestinal:  Negative for constipation, diarrhea and nausea.   Genitourinary:  Negative for frequency and urgency.   Musculoskeletal:  Positive for back pain and myalgias.        Neck pain and lower back pain denies radiating to her lower extremities   Neurological:  Negative for dizziness and numbness.   Psychiatric/Behavioral:  Negative for agitation, confusion and suicidal  ideas.         Physical Exam  Physical Exam  Constitutional:       General: He is not in acute distress.     Appearance: Normal appearance.   HENT:      Head: Normocephalic.   Eyes:      Extraocular Movements: Extraocular movements intact.   Musculoskeletal:      Lumbar back: Spasms and tenderness present. Decreased range of motion. Negative right straight leg raise test and negative left straight leg raise test.      Comments: Lumbar decreased range of motion with flexion extension lateral bending.  Pain on papulation to bilateral lumbar paraspinals.  Bilateral lower extremity vibratory sensation and reflexes intact.  Denies any bowel or bladder incontinence or saddle paresthesia.   Neurological:      General: No focal deficit present.      Mental Status: He is alert and oriented to person, place, and time.      GCS: GCS eye subscore is 4. GCS verbal subscore is 5. GCS motor subscore is 6.      Cranial Nerves: Cranial nerves 2-12 are intact.      Sensory: Sensation is intact.      Motor: Motor function is intact.      Coordination: Coordination is intact.      Gait: Gait is intact.      Deep Tendon Reflexes:      Reflex Scores:       Patellar reflexes are 2+ on the right side and 2+ on the left side.       Achilles reflexes are 2+ on the right side and 2+ on the left side.  Psychiatric:         Attention and Perception: Attention and perception normal.         Mood and Affect: Mood and affect normal.         Speech: Speech normal.         Behavior: Behavior normal.         Thought Content: Thought content normal.         Judgment: Judgment normal.           Last Recorded Vitals  Blood pressure 113/71, pulse 72, resp. rate 16, weight 68.9 kg (152 lb), SpO2 94%.    Reviewed Images  2/6/2023 lumbar x-ray showed severe degenerative changes with ankylosing spondylitis most likely with facet joint disease and osteopenia     2/3/2023 CT thoracic spine  IMPRESSION:  1. No acute fracture of the thoracic spine. Remote right  L1  transverse process mildly displaced fracture.  2. Moderate multilevel degenerative changes most prominent at T10-T11  with superimposed findings of DISH.  3. Emphysema within the imaged lung fields.  4. 4.4 cm ascending thoracic aorta aneurysm.     :  2/3/2023 CT lumbar spine and thoracic spine showed:  FINDINGS:  OSSEOUS STRUCTURES:  There is a mildly distracted transverse fracture identified through  the mid to superior aspect of the L5 vertebral body. A minimally  displaced transverse fracture is seen through the right transverse  process at L4 no additional fractures are seen. The vertebral bodies  are well aligned without evidence of subluxation.     Mild disc space narrowing and bridging osteophyte formation is seen  throughout the visualized thoracolumbar spine. Moderate to severe  facet degenerative changes are seen throughout the lumbar spine.     LOWER THORACIC SPINE:  No gross central canal or neural foraminal narrowing is seen.     T12-L1:  No gross central canal or neural foraminal narrowing is seen.     L1-2:  No gross central canal or neural foraminal narrowing is seen.     L2-3:  No gross central canal or neural foraminal narrowing is seen.     L3-4:  No gross central canal or neural foraminal narrowing is seen.     L4-5:  At least mild central canal narrowing is seen. No significant neural  foraminal narrowing is seen.     L5-S1:  Mild neural foraminal narrowing is seen bilaterally. No significant  central canal narrowing is seen.     ASSOCIATED STRUCTURES:  Evaluation of the visualized soft tissues of the abdomen is limited  by the lack of intravenous contrast. Within this limitation,  heterogeneous increased density is seen throughout the mid to  inferior aspect of the retroperitoneum, most consistent with  hemorrhage.     IMPRESSION:  1. L5 vertebral body fracture, as above.  2. Right L4 transverse process fracture.  3. Degenerative changes, as described above.       Reviewed Labs  Lab  Results   Component Value Date    GLUCOSE 89 03/03/2025    CALCIUM 9.6 03/03/2025     03/03/2025    K 4.1 03/03/2025    CO2 32 03/03/2025    CL 99 03/03/2025    BUN 16 03/03/2025    CREATININE 0.83 03/03/2025           Assessment/Plan     Horacio Gupta is a 64 y.o. male recalled past medical history of current smoker 10 cigarettes/day, depression, hypertension, COPD, L5 compression fracture, ankylosing spondylitis, ulcerative colitis on Remicade infusion, CAD with stents, CHF with ICD implant on Plavix, EMG on 6/13/2023 by Sinai Koenig showed severe L5 radiculopathy with activation denervation who is here for follow-up of chronic cervical and lower back pain.  He repeat his pain is 4 out of 10 he is completing his ADLs with less severe pain and improving his activity levels as the weather improves.  Previously been treated with caudal [facility] which continues to provide relief and reduce his pain and quality of life.  He manages his pain with gabapentin which has improved his pain and sleep.  He denies any new neurological or constitutional symptoms.  He denies any bowel or bladder incontinence or saddle paresthesia.  Will consider repeating caudal epidural steroid injection in the future to treat lumbar radicular symptoms.  Plan to continue with current pain management regiment as prescribed. The patient was advised to quit smoking. We explained the harmful effects of smoking including acceleration of degenerative changes in the spine and joints in general as well as exacerbation of chronic pain. The patient expresses understanding that smoking affects the degenerative processes in the spine and joints, accentuating degenerative diseases and the symptoms thereof. The patient understands the vital role smoking plays in chronic pain exacerbation. The patient understands that smoking is a personal, patient-specific, and disease-modifiable choice and that any untoward consequences in regard to further, ongoing  symptoms or problems in any way attributable to nicotine use, are the sole responsibility of the patient. The patient expressed gratitude in regards to the education given today on this topic all questions and concerns answered.  Plan to follow-up in 3 months or sooner if needed.    Plan  At least 50% of the visit was involved in the discussion of the options for treatment. We discussed exercises, medication, interventional therapies and surgery. Healthy life style is essential with patient hard work to achieve the wellness. In addition; discussion with the patient and/or family about any of the diagnostic results, impressions and/or recommended diagnostic studies, prognosis, risks and benefits of treatment options, instructions for treatment and/or follow-up, importance of compliance with chosen treatment options, risk-factor reduction, and patient/family education.     Will consider repeating caudal epidural steroid injection for lumbar radicular pain  Continue self-directed physical therapy  Strongly encouraged smoking cessation  Healthy lifestyle and anti-inflammatory diet in addition to weight control discussed with the patient  Alternative chronic pain therapies was discussed, encouraged and information was handed  Return to Clinic 3 months or sooner if needed.     *Please note this report has been produced using speech recognition software and may contain errors related to that system including grammar, punctuation and spelling as well as words and phrases that may be inappropriate. If there are questions or concerns, please feel free to contact me to clarify.      I spent 22 minutes in the professional and overall care of this patient.       Live Parks, APRN-CNP

## 2025-03-15 ENCOUNTER — HOSPITAL ENCOUNTER (OUTPATIENT)
Dept: RADIOLOGY | Facility: HOSPITAL | Age: 65
Discharge: HOME | End: 2025-03-15
Payer: MEDICARE

## 2025-03-15 DIAGNOSIS — D49.0 IPMN (INTRADUCTAL PAPILLARY MUCINOUS NEOPLASM): ICD-10-CM

## 2025-03-15 PROCEDURE — 2550000001 HC RX 255 CONTRASTS: Performed by: INTERNAL MEDICINE

## 2025-03-15 PROCEDURE — 74160 CT ABDOMEN W/CONTRAST: CPT | Performed by: RADIOLOGY

## 2025-03-15 PROCEDURE — 74160 CT ABDOMEN W/CONTRAST: CPT

## 2025-03-15 RX ADMIN — IOHEXOL 74 ML: 350 INJECTION, SOLUTION INTRAVENOUS at 10:47

## 2025-03-25 ENCOUNTER — HOSPITAL ENCOUNTER (OUTPATIENT)
Dept: CARDIOLOGY | Facility: CLINIC | Age: 65
Discharge: HOME | End: 2025-03-25
Payer: MEDICARE

## 2025-03-25 DIAGNOSIS — I47.20 VT (VENTRICULAR TACHYCARDIA) (MULTI): ICD-10-CM

## 2025-03-25 DIAGNOSIS — Z95.810 PRESENCE OF AUTOMATIC CARDIOVERTER/DEFIBRILLATOR (AICD): ICD-10-CM

## 2025-03-31 ENCOUNTER — TELEPHONE (OUTPATIENT)
Dept: PRIMARY CARE | Facility: CLINIC | Age: 65
End: 2025-03-31
Payer: MEDICARE

## 2025-03-31 NOTE — TELEPHONE ENCOUNTER
Patient had CT Abdomen done 3/11/25 hasn't heard anything about results and no follow up appointment was requested.  Please let  patient know results.  Thanks.

## 2025-04-07 DIAGNOSIS — K21.9 GASTROESOPHAGEAL REFLUX DISEASE, UNSPECIFIED WHETHER ESOPHAGITIS PRESENT: ICD-10-CM

## 2025-04-07 RX ORDER — OMEPRAZOLE 20 MG/1
20 CAPSULE, DELAYED RELEASE ORAL
Qty: 60 CAPSULE | Refills: 11 | Status: SHIPPED | OUTPATIENT
Start: 2025-04-07 | End: 2026-04-07

## 2025-05-06 LAB
25(OH)D3+25(OH)D2 SERPL-MCNC: 71 NG/ML (ref 30–100)
ALBUMIN SERPL-MCNC: 4.6 G/DL (ref 3.6–5.1)
ALP SERPL-CCNC: 52 U/L (ref 35–144)
ALT SERPL-CCNC: 22 U/L (ref 9–46)
ANION GAP SERPL CALCULATED.4IONS-SCNC: 10 MMOL/L (CALC) (ref 7–17)
AST SERPL-CCNC: 28 U/L (ref 10–35)
BASOPHILS # BLD AUTO: 31 CELLS/UL (ref 0–200)
BASOPHILS NFR BLD AUTO: 0.4 %
BILIRUB SERPL-MCNC: 0.4 MG/DL (ref 0.2–1.2)
BUN SERPL-MCNC: 17 MG/DL (ref 7–25)
CALCIUM SERPL-MCNC: 9.7 MG/DL (ref 8.6–10.3)
CHLORIDE SERPL-SCNC: 98 MMOL/L (ref 98–110)
CO2 SERPL-SCNC: 31 MMOL/L (ref 20–32)
CREAT SERPL-MCNC: 0.79 MG/DL (ref 0.7–1.35)
CRP SERPL-MCNC: <3 MG/L
EGFRCR SERPLBLD CKD-EPI 2021: 99 ML/MIN/1.73M2
EOSINOPHIL # BLD AUTO: 257 CELLS/UL (ref 15–500)
EOSINOPHIL NFR BLD AUTO: 3.3 %
ERYTHROCYTE [DISTWIDTH] IN BLOOD BY AUTOMATED COUNT: 12.8 % (ref 11–15)
ERYTHROCYTE [SEDIMENTATION RATE] IN BLOOD BY WESTERGREN METHOD: 2 MM/H
EST. AVERAGE GLUCOSE BLD GHB EST-MCNC: 123 MG/DL
EST. AVERAGE GLUCOSE BLD GHB EST-SCNC: 6.8 MMOL/L
GLUCOSE SERPL-MCNC: 101 MG/DL (ref 65–99)
HBA1C MFR BLD: 5.9 %
HCT VFR BLD AUTO: 46.7 % (ref 38.5–50)
HGB BLD-MCNC: 15.9 G/DL (ref 13.2–17.1)
LYMPHOCYTES # BLD AUTO: 1973 CELLS/UL (ref 850–3900)
LYMPHOCYTES NFR BLD AUTO: 25.3 %
MCH RBC QN AUTO: 32.5 PG (ref 27–33)
MCHC RBC AUTO-ENTMCNC: 34 G/DL (ref 32–36)
MCV RBC AUTO: 95.5 FL (ref 80–100)
MONOCYTES # BLD AUTO: 811 CELLS/UL (ref 200–950)
MONOCYTES NFR BLD AUTO: 10.4 %
NEUTROPHILS # BLD AUTO: 4727 CELLS/UL (ref 1500–7800)
NEUTROPHILS NFR BLD AUTO: 60.6 %
PLATELET # BLD AUTO: 214 THOUSAND/UL (ref 140–400)
PMV BLD REES-ECKER: 10.6 FL (ref 7.5–12.5)
POTASSIUM SERPL-SCNC: 4.6 MMOL/L (ref 3.5–5.3)
PROT SERPL-MCNC: 6.9 G/DL (ref 6.1–8.1)
RBC # BLD AUTO: 4.89 MILLION/UL (ref 4.2–5.8)
SODIUM SERPL-SCNC: 139 MMOL/L (ref 135–146)
WBC # BLD AUTO: 7.8 THOUSAND/UL (ref 3.8–10.8)

## 2025-05-20 ENCOUNTER — APPOINTMENT (OUTPATIENT)
Dept: CARDIOLOGY | Facility: CLINIC | Age: 65
End: 2025-05-20
Payer: MEDICARE

## 2025-05-20 VITALS
DIASTOLIC BLOOD PRESSURE: 60 MMHG | HEART RATE: 66 BPM | HEIGHT: 63 IN | SYSTOLIC BLOOD PRESSURE: 108 MMHG | WEIGHT: 151 LBS | OXYGEN SATURATION: 94 % | BODY MASS INDEX: 26.75 KG/M2

## 2025-05-20 DIAGNOSIS — I50.22 ACC/AHA STAGE C CHRONIC SYSTOLIC HEART FAILURE: ICD-10-CM

## 2025-05-20 DIAGNOSIS — Z72.0 TOBACCO ABUSE: ICD-10-CM

## 2025-05-20 DIAGNOSIS — I50.20 NYHA CLASS 3 HEART FAILURE WITH REDUCED EJECTION FRACTION: ICD-10-CM

## 2025-05-20 DIAGNOSIS — I25.5 ISCHEMIC CARDIOMYOPATHY: ICD-10-CM

## 2025-05-20 DIAGNOSIS — E78.2 MIXED HYPERLIPIDEMIA: ICD-10-CM

## 2025-05-20 DIAGNOSIS — I71.21 ANEURYSM OF ASCENDING AORTA WITHOUT RUPTURE: ICD-10-CM

## 2025-05-20 DIAGNOSIS — I25.10 CORONARY ARTERY DISEASE INVOLVING NATIVE CORONARY ARTERY OF NATIVE HEART, UNSPECIFIED WHETHER ANGINA PRESENT: Primary | ICD-10-CM

## 2025-05-20 DIAGNOSIS — I50.20 SYSTOLIC HEART FAILURE, ACC/AHA STAGE C: ICD-10-CM

## 2025-05-20 DIAGNOSIS — Z95.810 S/P ICD (INTERNAL CARDIAC DEFIBRILLATOR) PROCEDURE: ICD-10-CM

## 2025-05-20 RX ORDER — BUMETANIDE 1 MG/1
1 TABLET ORAL 3 TIMES WEEKLY
Qty: 36 TABLET | Refills: 3 | Status: SHIPPED | OUTPATIENT
Start: 2025-05-21 | End: 2026-05-21

## 2025-05-20 NOTE — PROGRESS NOTES
[facility]  Cardiology Clinic Visit Note    History of present illness:  This is a 64 y.o. male with a history of coronary artery disease status post PCI, systolic heart failure status post ICD, ascending aortic aneurysm with aortic valve regurgitation, hypertension, obstructive sleep apnea, COPD, ankylosing spondylitis, and ulcerative colitis.  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   Tobacco less than a ppday, former 2 ppday cigarette smoker for long time, Alcohol Denies, Caffeine use  2 cups of coffee /day, Drug use  Denies  FamHx: Brother had CABGx2,  of SCD age 69.     He has had no ER visits or heart failure admissions since previous visit    Subjective:  Chest pounding all the time with burning constant that waxes and wanes for years. Denies chest pressure, doesn't need his PRN nitroglycerin tab. SOB with over exertion. No worsening symptoms in the past 6 mos. Still feels very fatigued but overall denies any worsening or progressive symptoms.    Cardiac Testing  Echo (2024): Mildly decreased left ventricular systolic function with estimated LVEF 35 to 40% with global hypokinesis and minor regional variations.  LV cavity is normal size with grade 1 LV diastolic dysfunction.  Normal LA with negative bubble study, normal RA.  Normal RV size and systolic function.  Mild aortic valve regurgitation with normal aortic root.  No effusion.  Myocardial SPECT (2023): Abnormal perfusion study with mid sized to large territory of fixed perfusion defect in mid anterior and apical anterior lateral walls extending into the apex consistent with prior infarct.  Cardiac MRI (2020): Normal LV size with moderate impaired systolic function with LVEF 34%.  Mid anterior/anterolateral wall is thinned and akinetic with global hypokinesis.  LV wall is of normal thickness and normal LV mass.  There is  transmural infarction of the mid anterior/anterolateral walls with contiguous subendocardial infarction of the basal anterior septal/anterior walls 50 to 75% and apical lateral walls 25 to 50%.  LV scar size 16%.  Coronary angiography (November 2018): Nonobstructive coronary artery disease with patent previous stent.  Proximal RCA 40% stenosis, 60% stenosis of right posterior lateral branch with negative FFR of 0.85.    Assessment and Plan  Coronary artery disease  Mixed hyperlipidemia  - NYHA Class II  - Status post PCI to RCA  - Remains with residual chronic coronary disease of right coronary artery with negative FFR in the member 2018.  - LDL 79 on lipid panel March 2025  -Stop Plavix.   - Continue aspirin 81 mg daily for life  - LDL near goal, continue high intensity statin therapy    ACC stage C chronic systolic heart failure/heart failure with reduced ejection fraction  Ischemic cardiomyopathy  -Status post ICD implant August 2016  -No events reported on remote device check.  Estimated RAMAN 6.6 years  - Not on Entresto due to hypotension  - Reported unable to tolerate Jardiance due to headaches  -follows with [facility] CHF clinic every 4 months.   -Home BP 110s/60s in the morning before meds.  At goal.   -Has a scale at home but does not use. Has a good appetite.   -Active, takes breaks and feels worn out the next day if he over does it.   - Labs are stable  - Euvolemic on exam  -Encouraged to weigh himself every day reported weight gain of 5 pounds or more in less than 1 week  - Continue lisinopril 5 mg daily  - Continue spironolactone 25 mg daily  - Continue Toprol- mg daily  - Continue bumetanide 1 mg 3 times a week    Thoracic aortic aneurysm  Tobacco abuse  - Ascending TAA Stable on CT scan with maximum diameter 4.3-4.4 cm  -Blood pressure well-controlled  -Repeat CTA in 11/2026  -I had a thorough discussion with the patient lasting at least 3 to 5 minutes addressing the health hazard risks for smoking,  including but not limited to heart attack, stroke and cancer. The patient states to understand the risks and is contemplating on quitting smoking.     Return to Care:  6 months    Objective  VITALS  Vitals:    05/20/25 0957   BP: 108/60   Pulse: 66   SpO2: 94%       Weight  Vitals:    05/20/25 0957   Weight: 68.5 kg (151 lb)       Past Medical History  Medical History[1]    Past Surgical History  Surgical History[2]    Medications  Current Outpatient Medications   Medication Instructions    Advair -21 mcg/actuation inhaler 2 puffs, 2 times daily RT    albuterol 90 mcg/actuation inhaler 2 puffs, inhalation, Every 6 hours, As directed    albuterol 2.5 mg, Every 6 hours PRN    aspirin 81 mg capsule 1 tablet, Daily    atorvastatin (LIPITOR) 80 mg, oral, Nightly    bumetanide (BUMEX) 1 mg, oral, Daily PRN    calcium carbonate-vitamin D3 600 mg-5 mcg (200 unit) tablet 1 tablet, Daily    cetirizine (ZyrTEC) 10 mg tablet 1 tablet, Daily    cholecalciferol (Vitamin D-3) 125 MCG (5000 UT) capsule 1 capsule, Every other day    clopidogrel (PLAVIX) 75 mg, oral, Daily    diclofenac sodium (Voltaren) 1 % gel APPLY 1 APPLICATION TOPICALLY 4 TIMES DAILY    docusate sodium (COLACE) 100 mg, 2 times daily    fexofenadine (ALLEGRA) 180 mg, Once as needed    gabapentin (NEURONTIN) 300 mg, 3 times daily    guaiFENesin (Mucinex) 600 mg 12 hr tablet 1 tablet, As needed    lactobacillus acidophilus (acidophilus-pectin, citrus) tablet tablet 2 tablets, oral, Daily    lisinopril 5 mg, oral, Daily    magnesium oxide (Mag-Ox) 400 mg (241.3 mg magnesium) tablet 1 tablet, Daily    metoprolol succinate XL (TOPROL-XL) 100 mg, oral, Daily    multivitamin tablet 1 tablet, Daily    nitroglycerin (NITROSTAT) 0.4 mg, sublingual, As needed    omeprazole (PRILOSEC) 20 mg, oral, 2 times daily before meals    polyethylene glycol (GLYCOLAX, MIRALAX) 17 g, As needed    Remicade 100 mg    spironolactone (ALDACTONE) 25 mg, oral, Daily    tiotropium  (Spiriva with HandiHaler) 18 mcg inhalation capsule 1 capsule, Daily    triamcinolone (Kenalog) 0.5 % cream Topical, 2 times daily       Allergies  Allergies[3]    Social History  Social History[4]    Family History  Family History[5]        PHYSICAL EXAM  Physical Exam  Vitals and nursing note reviewed.   Constitutional:       General: He is not in acute distress.  HENT:      Head: Normocephalic and atraumatic.      Mouth/Throat:      Mouth: Mucous membranes are moist.      Pharynx: Oropharynx is clear.   Eyes:      General: No scleral icterus.     Pupils: Pupils are equal, round, and reactive to light.   Cardiovascular:      Rate and Rhythm: Normal rate and regular rhythm.      Pulses: Normal pulses.      Heart sounds: Normal heart sounds, S1 normal and S2 normal. No murmur heard.     No friction rub.   Pulmonary:      Effort: Pulmonary effort is normal.      Breath sounds: Normal breath sounds.   Abdominal:      General: Bowel sounds are normal. There is no distension.      Palpations: Abdomen is soft.      Tenderness: There is no abdominal tenderness.   Musculoskeletal:         General: Normal range of motion.      Cervical back: Normal range of motion and neck supple.      Right lower leg: No edema.      Left lower leg: No edema.   Skin:     General: Skin is warm and dry.      Capillary Refill: Capillary refill takes less than 2 seconds.      Findings: No rash.   Neurological:      General: No focal deficit present.      Mental Status: He is alert.   Psychiatric:         Mood and Affect: Mood normal.         Behavior: Behavior normal.         Cardiovascular Labs  Lab Results   Component Value Date    HGB 15.9 05/05/2025    HGB 16.4 03/03/2025    HGB 16.0 10/21/2024    HGB 14.7 03/11/2024    HGB 14.5 02/12/2024     05/05/2025    WBC 7.8 05/05/2025     05/05/2025    K 4.6 05/05/2025    CREATININE 0.79 05/05/2025    CREATININE 0.83 03/03/2025    CREATININE 0.84 10/21/2024    CREATININE 0.70 03/11/2024     CREATININE 0.72 02/12/2024    BUN 17 05/05/2025    CALCIUM 9.7 05/05/2025    INR 0.9 02/07/2023    BNP 35 08/05/2021    TROPHS 14 02/03/2023    TROPHS 7 02/03/2023    TROPHS 7 02/03/2023    LDLF 88 01/16/2023       Echocardiogram  Results for orders placed during the hospital encounter of 06/04/24    Transthoracic Echo (TTE) [place] [address]  Phone [phone], Fax [phone]    TRANSTHORACIC ECHOCARDIOGRAM REPORT      Patient Name:      ARSENIO LAWSONCHEY Reading Physician:    Angie Ray MD  Study Date:        6/4/2024             Ordering Provider:    Angie RAY  MRN/PID:           [id number]             Fellow:  Accession#:        [id number]         Nurse:                Emelyn Corrales RN  Date of Birth/Age: 1960 / 63 years Sonographer:          JULIA Anna RVT  Gender:            M                    Additional Staff:  Height:            162.56 cm            Admit Date:  Weight:            58.97 kg             Admission Status:     Outpatient  BSA / BMI:         1.63 m2 / 22.31      Department Location:  kg/m2  Blood Pressure: 109 /69 mmHg    Study Type:    TRANSTHORACIC ECHO (TTE) COMPLETE  Diagnosis/ICD: Aortic aneurysm of unspecified site, without rupture-I71.9;  Ischemic cardiomyopathy-I25.5  Indication:    Cardiomyopathy  CPT Codes:     Echo Complete w Full [id number]    Patient History:  Pertinent History: Previous echo 10-4-21.    Study Detail: The following Echo studies were performed: 2D, M-Mode, Doppler and  color flow. Definity used as a contrast agent for endocardial  border definition and agitated saline used as a contrast agent for  intraseptal flow evaluation. Total contrast used for this  procedure was 2 mL via IV push. The patient was awake.      PHYSICIAN INTERPRETATION:  Left Ventricle: Left ventricular systolic function is moderately decreased, with an estimated  ejection fraction of 35-40%. There is global hypokinesis of the left ventricle with minor regional variations. The left ventricular cavity size is normal. Spectral Doppler shows an impaired relaxation pattern of left ventricular diastolic filling.  Left Atrium: The left atrium is normal in size. A bubble study using agitated saline was performed. Bubble study is negative.  Right Ventricle: The right ventricle is normal in size. There is normal right ventricular global systolic function. Pacemaker/defibrillator wire seen in the right ventricle and the right atrium.  Right Atrium: The right atrium is normal in size.  Aortic Valve: The aortic valve is trileaflet. There is mild aortic valve regurgitation. The peak instantaneous gradient of the aortic valve is 5.2 mmHg. The mean gradient of the aortic valve is 3.0 mmHg.  Mitral Valve: The mitral valve is normal in structure. There is no evidence of mitral valve regurgitation.  Tricuspid Valve: The tricuspid valve is structurally normal. There is mild tricuspid regurgitation.  Pulmonic Valve: The pulmonic valve is not well visualized. There is no indication of pulmonic valve regurgitation.  Pericardium: There is no pericardial effusion noted.  Aorta: The aortic root is normal.      CONCLUSIONS:  1. Left ventricular systolic function is moderately decreased with a 35-40% estimated ejection fraction.  2. There is global hypokinesis of the left ventricle with minor regional variations.  3. Spectral Doppler shows an impaired relaxation pattern of left ventricular diastolic filling.  4. Mild aortic valve regurgitation.  5. No significant changes compared to prior echocardiogram dated 5/2/2023.    QUANTITATIVE DATA SUMMARY:  2D MEASUREMENTS:  Normal Ranges:  Ao Root d:     3.30 cm    (2.0-3.7cm)  LAs:           2.90 cm    (2.7-4.0cm)  IVSd:          1.11 cm    (0.6-1.1cm)  LVPWd:         1.14 cm    (0.6-1.1cm)  LVIDd:         4.89 cm    (3.9-5.9cm)  LVIDs:         3.50 cm  LV  Mass Index: 126.6 g/m2  LV % FS        28.3 %    LA VOLUME:  Normal Ranges:  LA Vol A4C:        27.3 ml    (22+/-6mL/m2)  LA Vol A2C:        38.7 ml  LA Vol BP:         33.2 ml  LA Vol Index A4C:  16.7ml/m2  LA Vol Index A2C:  23.7 ml/m2  LA Vol Index BP:   20.4 ml/m2  LA Area A4C:       12.8 cm2  LA Area A2C:       14.9 cm2  LA Major Axis A4C: 5.1 cm  LA Major Axis A2C: 4.9 cm  LA Volume Index:   22.9 ml/m2  LA Vol A4C:        25.9 ml  LA Vol A2C:        37.4 ml    M-MODE MEASUREMENTS:  Normal Ranges:  AoV Exc: 1.90 cm (1.5-2.5cm)    AORTA MEASUREMENTS:  Normal Ranges:  AoV Exc: 1.90 cm (1.5-2.5cm)    LV SYSTOLIC FUNCTION BY 2D PLANIMETRY (MOD):  Normal Ranges:  EF-A4C View: 39.1 % (>=55%)  EF-A2C View: 53.1 %  EF-Biplane:  46.5 %    LV DIASTOLIC FUNCTION:  Normal Ranges:  MV Peak E:    0.55 m/s (0.7-1.2 m/s)  MV Peak A:    0.74 m/s (0.42-0.7 m/s)  E/A Ratio:    0.74     (1.0-2.2)  MV e'         0.04 m/s (>8.0)  MV lateral e' 0.07 m/s  MV medial e'  0.05 m/s  E/e' Ratio:   13.70    (<8.0)    MITRAL VALVE:  Normal Ranges:  MV DT: 229 msec (150-240msec)    AORTIC VALVE:  Normal Ranges:  AoV Vmax:                1.14 m/s (<=1.7m/s)  AoV Peak P.2 mmHg (<20mmHg)  AoV Mean PG:             3.0 mmHg (1.7-11.5mmHg)  LVOT Max Master:            0.92 m/s (<=1.1m/s)  AoV VTI:                 24.40 cm (18-25cm)  LVOT VTI:                18.20 cm  LVOT Diameter:           2.00 cm  (1.8-2.4cm)  AoV Area, VTI:           2.34 cm2 (2.5-5.5cm2)  AoV Area,Vmax:           2.52 cm2 (2.5-4.5cm2)  AoV Dimensionless Index: 0.75    AORTIC INSUFFICIENCY:  AI Vmax:       3.42 m/s  AI Half-time:  604 msec  AI Decel Rate: 156.50 cm/s2      RIGHT VENTRICLE:  RV Basal 2.77 cm  RV Mid   2.57 cm  RV Major 5.8 cm  TAPSE:   12.4 mm    TRICUSPID VALVE/RVSP:  Normal Ranges:  Peak TR Velocity: 2.53 m/s  RV Syst Pressure: 28.6 mmHg (< 30mmHg)    PULMONIC VALVE:  Normal Ranges:  PV Accel Time: 127 msec (>120ms)  PV Max Master:    0.8 m/s   (0.6-0.9m/s)  PV Max P.4 mmHg      [id number] Wilbur Polk MD  Electronically signed on 2024 at 12:29:06 PM        ** Final **       The ASCVD Risk score (Gab DK, et al., 2019) failed to calculate for the following reasons:    Risk score cannot be calculated because patient has a medical history suggesting prior/existing ASCVD  Low Risk: <5%  Borderline Risk: 5%-7.4%  Intermediate Risk: 7.5% - 19.9%  High Risk: >20%    If your symptoms worsen or progress please go directory to your nearest emergency department for evaluation.     Thank you for this interesting clinical case and allowing me to participate in the care of this patient. Please reach me out if you have any questions or if you need any clarifications regarding this patient's care.    **Disclaimer: This note was dictated by speech recognition, and every effort has been made to prevent any error in transcription, however minor errors may be present**  ___________________________________________________  Christophe Gill, MSN, CNP, ACNPC, CCRN  Advanced Practice Provider, Nurse Practitioner  Division of Cardiovascular Medicine  [facility]  [facility]         [1]   Past Medical History:  Diagnosis Date    Ankylosing spondylitis of unspecified sites in spine (Multi) 2022    Ankylosing spondylitis    Aortic aneurysm of unspecified site, without rupture 2022    Aortic aneurysm    Atherosclerotic heart disease of native coronary artery without angina pectoris 2022    CAD (coronary artery disease)    Chronic obstructive pulmonary disease, unspecified 10/11/2022    Chronic obstructive pulmonary disease    Essential (primary) hypertension 10/11/2022    HTN (hypertension), benign    Gastro-esophageal reflux disease without esophagitis 10/11/2022    GERD (gastroesophageal reflux disease)    Noninfective gastroenteritis and colitis, unspecified 2022    Inflammatory bowel  diseases (IBD)    Obstructive sleep apnea (adult) (pediatric) 10/27/2020    Obstructive sleep apnea    Other idiopathic scoliosis, site unspecified 08/17/2020    Idiopathic scoliosis in adult patient    Pure hypercholesterolemia, unspecified 06/08/2022    Hypercholesterolemia    Ulcerative colitis, unspecified, without complications (Multi) 10/11/2022    Ulcerative colitis    Unilateral inguinal hernia, without obstruction or gangrene, not specified as recurrent 12/10/2019    Right inguinal hernia    Unilateral primary osteoarthritis, right hip 03/05/2020    Arthritis of right hip    Unspecified systolic (congestive) heart failure 10/11/2022    Systolic heart failure, ACC/AHA stage C    Ventral hernia without obstruction or gangrene 11/25/2019    Ventral hernia    Vitamin D deficiency, unspecified 10/11/2022    Vitamin D deficiency   [2]   Past Surgical History:  Procedure Laterality Date    ANKLE SURGERY      CARDIAC DEFIBRILLATOR PLACEMENT      COLONOSCOPY      CORONARY ANGIOPLASTY WITH STENT PLACEMENT  01/20/2017    Cath Stent Placement    ESOPHAGOGASTRODUODENOSCOPY      OTHER SURGICAL HISTORY  07/19/2021    Intra-articular corticosteroid injection    OTHER SURGICAL HISTORY  12/10/2019    Finger surgical procedure    TOTAL HIP ARTHROPLASTY Left    [3]   Allergies  Allergen Reactions    Empagliflozin Headache    Penicillins Hives and Angioedema     Angioedema    Ranolazine Unknown    Sacubitril-Valsartan Other and Headache     hypoglycemia    Sulfamethoxazole Headache   [4]   Social History  Tobacco Use    Smoking status: Every Day     Current packs/day: 0.25     Types: Cigarettes     Passive exposure: Current    Smokeless tobacco: Never   Vaping Use    Vaping status: Never Used   Substance Use Topics    Alcohol use: Not Currently    Drug use: Never   [5]   Family History  Problem Relation Name Age of Onset    Stroke Mother      Diabetes Mother      Heart failure Mother      Hypertension Mother      Heart failure  Father      Hypertension Father      Diabetes Brother      Other (C A B G x1) Brother

## 2025-05-27 DIAGNOSIS — I25.119 CORONARY ARTERY DISEASE WITH ANGINA PECTORIS, UNSPECIFIED VESSEL OR LESION TYPE, UNSPECIFIED WHETHER NATIVE OR TRANSPLANTED HEART: ICD-10-CM

## 2025-06-12 ENCOUNTER — APPOINTMENT (OUTPATIENT)
Dept: PAIN MEDICINE | Facility: CLINIC | Age: 65
End: 2025-06-12
Payer: MEDICARE

## 2025-06-19 NOTE — PROGRESS NOTES
Chief Complain  Follow-up of widespread muscle joint pain with lower back pain rating to the bilateral lower extremities.    History Of Present Illness  Horacio Gupta is a 64 y.o. male here for widespread muscle and joint pain with lower back pain radiating to lower extremities. The patient rates the pain at 6 on a scale from 0-10.  The patient describes pain as sharp, aching, radiating.  The pain is worsened by bending forward, standing, walking, and lifting and is alleviated by medications nonsteroidal anti-inflammatory drugs and Tylenol, position change, cortisone injections, lying down, and IV infusion therapy.  Since the last visit the pain has worsened.    The patient denies any fever, chills, weight loss, weakness, bladder/ bowel incontinence, history of cancer, history of IV drug abuse, recent trauma.     Prior office visit:  3/12/2025  Horacio Gupta is a 64 y.o. male recalled past medical history of current smoker 10 cigarettes/day, depression, hypertension, COPD, L5 compression fracture, ankylosing spondylitis, ulcerative colitis on Remicade infusion, CAD with stents, CHF with ICD implant on Plavix, EMG on 6/13/2023 by Sinai Koenig showed severe L5 radiculopathy with activation denervation who is here for follow-up of chronic cervical and lower back pain.  He repeat his pain is 4 out of 10 he is completing his ADLs with less severe pain and improving his activity levels as the weather improves.  Previously been treated with caudal NYASIA which continues to provide relief and reduce his pain and quality of life.  He manages his pain with gabapentin which has improved his pain and sleep.  He denies any new neurological or constitutional symptoms.  He denies any bowel or bladder incontinence or saddle paresthesia.  Will consider repeating caudal epidural steroid injection in the future to treat lumbar radicular symptoms.  Plan to continue with current pain management regiment as prescribed. The  patient was advised to quit smoking. We explained the harmful effects of smoking including acceleration of degenerative changes in the spine and joints in general as well as exacerbation of chronic pain. The patient expresses understanding that smoking affects the degenerative processes in the spine and joints, accentuating degenerative diseases and the symptoms thereof. The patient understands the vital role smoking plays in chronic pain exacerbation. The patient understands that smoking is a personal, patient-specific, and disease-modifiable choice and that any untoward consequences in regard to further, ongoing symptoms or problems in any way attributable to nicotine use, are the sole responsibility of the patient. The patient expressed gratitude in regards to the education given today on this topic all questions and concerns answered.  Plan to follow-up in 3 months or sooner if needed.       Procedures:  11/4/2024 caudal NYASIA the patient has had a 90% improvement in pain and function  2/5/2024 caudal NYASIA the patient has had a 80% improvement in pain and function  4/18/2023 caudal NYASIA the patient has had a 100% improvement in pain and function      Portions of record reviewed for pertinent issues: active problem list, medication list, allergies, family history, social history, notes from last encounter, encounters, lab results, imaging and other available records.      I have personally reviewed the [facility] report for this patient. This report is scanned into the electronic medical record. I have considered the risks of abuse, dependence, addiction and diversion. It showed:  Patient on lorazepam and gabapentin from Dr. Ansari  OPIOID RISK ASSESSMENT SCORE 0/26  Aberrant behavior: Non    Past Medical History  He has a past medical history of Ankylosing spondylitis of unspecified sites in spine (Multi) (06/08/2022), Aortic aneurysm of unspecified site, without rupture (04/26/2022), Atherosclerotic heart disease of  native coronary artery without angina pectoris (07/27/2022), Chronic obstructive pulmonary disease, unspecified (10/11/2022), Essential (primary) hypertension (10/11/2022), Gastro-esophageal reflux disease without esophagitis (10/11/2022), Noninfective gastroenteritis and colitis, unspecified (06/08/2022), Obstructive sleep apnea (adult) (pediatric) (10/27/2020), Other idiopathic scoliosis, site unspecified (08/17/2020), Pure hypercholesterolemia, unspecified (06/08/2022), Ulcerative colitis, unspecified, without complications (Multi) (10/11/2022), Unilateral inguinal hernia, without obstruction or gangrene, not specified as recurrent (12/10/2019), Unilateral primary osteoarthritis, right hip (03/05/2020), Unspecified systolic (congestive) heart failure (10/11/2022), Ventral hernia without obstruction or gangrene (11/25/2019), and Vitamin D deficiency, unspecified (10/11/2022).    Surgical History  He has a past surgical history that includes Coronary angioplasty with stent (01/20/2017); Other surgical history (07/19/2021); Other surgical history (12/10/2019); Esophagogastroduodenoscopy; Colonoscopy; Cardiac defibrillator placement; Ankle surgery; and Total hip arthroplasty (Left).     Social History  He reports that he has been smoking cigarettes. He has been exposed to tobacco smoke. He has never used smokeless tobacco. He reports that he does not currently use alcohol. He reports that he does not use drugs.    Family History  Family History[1]     Allergies  Empagliflozin, Penicillins, Ranolazine, Sacubitril-valsartan, and Sulfamethoxazole    Review of Systems  Review of Systems   Constitutional:  Negative for chills and fatigue.   Respiratory:  Negative for chest tightness, shortness of breath and wheezing.    Cardiovascular:  Negative for chest pain and palpitations.   Gastrointestinal:  Negative for constipation, diarrhea and nausea.   Genitourinary:  Negative for frequency and urgency.   Musculoskeletal:   Positive for arthralgias, back pain and myalgias.   Neurological:  Positive for numbness. Negative for dizziness and weakness.   Psychiatric/Behavioral:  Negative for agitation and confusion.         Physical Exam  Physical Exam  Constitutional:       General: He is not in acute distress.     Appearance: Normal appearance.       HENT:      Head: Normocephalic.   Eyes:      Extraocular Movements: Extraocular movements intact.   Musculoskeletal:      Lumbar back: Spasms and tenderness present. Decreased range of motion.      Comments: Lumbar decreased range of motion with flexion extension, pain with lateral bending right and left.  Bilateral lumbar paraspinals and gluteal region tender to palpation, bilateral sacroiliac notch tender to palpation, provocative maneuvers negative, bilateral sit slump test positive with tight nests in hamstrings, bilateral lower extremity reflexes diminished, vibratory sensation intact.  Denies any bowel or bladder incontinence or saddle paresthesia.   Neurological:      General: No focal deficit present.      Mental Status: He is alert and oriented to person, place, and time.      GCS: GCS eye subscore is 4. GCS verbal subscore is 5. GCS motor subscore is 6.      Cranial Nerves: Cranial nerves 2-12 are intact.      Sensory: Sensation is intact.      Motor: Motor function is intact.      Coordination: Coordination is intact.      Gait: Gait abnormal.      Deep Tendon Reflexes:      Reflex Scores:       Patellar reflexes are 2+ on the right side and 2+ on the left side.       Achilles reflexes are 2+ on the right side and 2+ on the left side.     Comments: Antalgic gait   Psychiatric:         Attention and Perception: Attention and perception normal.         Mood and Affect: Mood normal.         Speech: Speech normal.         Behavior: Behavior normal.         Thought Content: Thought content normal.         Cognition and Memory: Cognition normal.         Judgment: Judgment normal.       "     Last Recorded Vitals  Blood pressure 124/62, pulse 69, temperature 36 °C (96.8 °F), temperature source Temporal, resp. rate 10, height 1.626 m (5' 4\"), weight 68 kg (150 lb), SpO2 95%.    Reviewed Images  2/6/2023 lumbar x-ray showed severe degenerative changes with ankylosing spondylitis most likely with facet joint disease and osteopenia     2/3/2023 CT thoracic spine  IMPRESSION:  1. No acute fracture of the thoracic spine. Remote right L1  transverse process mildly displaced fracture.  2. Moderate multilevel degenerative changes most prominent at T10-T11  with superimposed findings of DISH.  3. Emphysema within the imaged lung fields.  4. 4.4 cm ascending thoracic aorta aneurysm.     :  2/3/2023 CT lumbar spine and thoracic spine showed:  FINDINGS:  OSSEOUS STRUCTURES:  There is a mildly distracted transverse fracture identified through  the mid to superior aspect of the L5 vertebral body. A minimally  displaced transverse fracture is seen through the right transverse  process at L4 no additional fractures are seen. The vertebral bodies  are well aligned without evidence of subluxation.     Mild disc space narrowing and bridging osteophyte formation is seen  throughout the visualized thoracolumbar spine. Moderate to severe  facet degenerative changes are seen throughout the lumbar spine.     LOWER THORACIC SPINE:  No gross central canal or neural foraminal narrowing is seen.     T12-L1:  No gross central canal or neural foraminal narrowing is seen.     L1-2:  No gross central canal or neural foraminal narrowing is seen.     L2-3:  No gross central canal or neural foraminal narrowing is seen.     L3-4:  No gross central canal or neural foraminal narrowing is seen.     L4-5:  At least mild central canal narrowing is seen. No significant neural  foraminal narrowing is seen.     L5-S1:  Mild neural foraminal narrowing is seen bilaterally. No significant  central canal narrowing is seen.     ASSOCIATED " STRUCTURES:  Evaluation of the visualized soft tissues of the abdomen is limited  by the lack of intravenous contrast. Within this limitation,  heterogeneous increased density is seen throughout the mid to  inferior aspect of the retroperitoneum, most consistent with  hemorrhage.     IMPRESSION:  1. L5 vertebral body fracture, as above.  2. Right L4 transverse process fracture.  3. Degenerative changes, as described above.    Reviewed Labs  Lab Results   Component Value Date    GLUCOSE 101 (H) 05/05/2025    CALCIUM 9.7 05/05/2025     05/05/2025    K 4.6 05/05/2025    CO2 31 05/05/2025    CL 98 05/05/2025    BUN 17 05/05/2025    CREATININE 0.79 05/05/2025         Assessment/Plan     Horacio Gupta is a 64 y.o. male here with his wife, recall past medical history of current smoker, depression, hypertension, COPD, L5 compression fracture, ankylosing spondylosis, ulcerative colitis on Remicade infusions, CAD with stents, CHF with ICD implant on Plavix, EMG showed severe L5 radiculopathy with activation denervation, who is here for follow-up of widespread muscle joint pain and chronic lumbar radiculopathy.  He manages his pain with  Gabapentin, Tylenol and Voltaren gel which provides moderate relief of symptoms however the lower back symptoms have been more consistent and frequent.  He also reports pain in his right foot metatarsals denies trauma or injury related to pain.  In the past he has received caudal epidural steroid injection with significant relief of lumbar radiculopathy symptoms.  On physical examination he has lumbar decreased range of motion with lumbar flexion and extension, pain on palpation to the lateral bending, diminished bilateral lower extremity reflexes, bilateral sit slump test positive with tightness in hamstrings.  Symptoms consistent with lumbar spondylosis with radiculopathy.  Will plan for caudal epidural steroid injection after 1 mg Ativan to take 1 hour prior to injection. I  spent time with the patient discussing all of the risks, benefits, and alternatives to this measure. Including but not limited to spinal infection, epidural hematoma/abscess, paralysis, nerve injury, steroid effects, and spinal headache. The patient understands and will schedule a repeat as needed.    Plan to continue with current pain medication regimen as prescribed.  All questions and concerns answered.  Plan to follow-up in 6 to 8 weeks after injection completed.  Plan  At least 50% of the visit was involved in the discussion of the options for treatment. We discussed exercises, medication, interventional therapies and surgery. Healthy life style is essential with patient hard work to achieve the wellness. In addition; discussion with the patient and/or family about any of the diagnostic results, impressions and/or recommended diagnostic studies, prognosis, risks and benefits of treatment options, instructions for treatment and/or follow-up, importance of compliance with chosen treatment options, risk-factor reduction, and patient/family education.     Pool therapy, walking in the pool, at least 3x per week for 30 minutes  Continue self-directed physical therapy  Provided referral to podiatry for right foot metatarsal pain.  Plan for fluoroscopy guided caudal epidural steroid injection after 2 mg Ativan 1 hour prior to injection.  Healthy lifestyle and anti-inflammatory diet in addition to weight control discussed with the patient  Alternative chronic pain therapies was discussed, encouraged and information was handed  Return to Clinic 6 to 8 weeks after injection completed     *Please note this report has been produced using speech recognition software and may contain errors related to that system including grammar, punctuation and spelling as well as words and phrases that may be inappropriate. If there are questions or concerns, please feel free to contact me to clarify.      I spent 43 minutes in the  professional and overall care of this patient.       DAVID Teague-JERARDO              [1]   Family History  Problem Relation Name Age of Onset    Stroke Mother      Diabetes Mother      Heart failure Mother      Hypertension Mother      Heart failure Father      Hypertension Father      Diabetes Brother      Other (C A B G x1) Brother

## 2025-06-19 NOTE — H&P (VIEW-ONLY)
Chief Complain  Follow-up of widespread muscle joint pain with lower back pain rating to the bilateral lower extremities.    History Of Present Illness  Horacio Gupta is a 64 y.o. male here for widespread muscle and joint pain with lower back pain radiating to lower extremities. The patient rates the pain at 6 on a scale from 0-10.  The patient describes pain as sharp, aching, radiating.  The pain is worsened by bending forward, standing, walking, and lifting and is alleviated by medications nonsteroidal anti-inflammatory drugs and Tylenol, position change, cortisone injections, lying down, and IV infusion therapy.  Since the last visit the pain has worsened.    The patient denies any fever, chills, weight loss, weakness, bladder/ bowel incontinence, history of cancer, history of IV drug abuse, recent trauma.     Prior office visit:  3/12/2025  Horacio Gupta is a 64 y.o. male recalled past medical history of current smoker 10 cigarettes/day, depression, hypertension, COPD, L5 compression fracture, ankylosing spondylitis, ulcerative colitis on Remicade infusion, CAD with stents, CHF with ICD implant on Plavix, EMG on 6/13/2023 by [facility] Koenig showed severe L5 radiculopathy with activation denervation who is here for follow-up of chronic cervical and lower back pain.  He repeat his pain is 4 out of 10 he is completing his ADLs with less severe pain and improving his activity levels as the weather improves.  Previously been treated with caudal NYASIA which continues to provide relief and reduce his pain and quality of life.  He manages his pain with gabapentin which has improved his pain and sleep.  He denies any new neurological or constitutional symptoms.  He denies any bowel or bladder incontinence or saddle paresthesia.  Will consider repeating caudal epidural steroid injection in the future to treat lumbar radicular symptoms.  Plan to continue with current pain management regiment as prescribed. The  patient was advised to quit smoking. We explained the harmful effects of smoking including acceleration of degenerative changes in the spine and joints in general as well as exacerbation of chronic pain. The patient expresses understanding that smoking affects the degenerative processes in the spine and joints, accentuating degenerative diseases and the symptoms thereof. The patient understands the vital role smoking plays in chronic pain exacerbation. The patient understands that smoking is a personal, patient-specific, and disease-modifiable choice and that any untoward consequences in regard to further, ongoing symptoms or problems in any way attributable to nicotine use, are the sole responsibility of the patient. The patient expressed gratitude in regards to the education given today on this topic all questions and concerns answered.  Plan to follow-up in 3 months or sooner if needed.       Procedures:  11/4/2024 caudal NYASIA the patient has had a 90% improvement in pain and function  2/5/2024 caudal NYASIA the patient has had a 80% improvement in pain and function  4/18/2023 caudal NYASIA the patient has had a 100% improvement in pain and function      Portions of record reviewed for pertinent issues: active problem list, medication list, allergies, family history, social history, notes from last encounter, encounters, lab results, imaging and other available records.      I have personally reviewed the O[facility] report for this patient. This report is scanned into the electronic medical record. I have considered the risks of abuse, dependence, addiction and diversion. It showed:  Patient on lorazepam and gabapentin from Dr. Ansari  OPIOID RISK ASSESSMENT SCORE 0/26  Aberrant behavior: Non    Past Medical History  He has a past medical history of Ankylosing spondylitis of unspecified sites in spine (Multi) (06/08/2022), Aortic aneurysm of unspecified site, without rupture (04/26/2022), Atherosclerotic heart disease of  native coronary artery without angina pectoris (07/27/2022), Chronic obstructive pulmonary disease, unspecified (10/11/2022), Essential (primary) hypertension (10/11/2022), Gastro-esophageal reflux disease without esophagitis (10/11/2022), Noninfective gastroenteritis and colitis, unspecified (06/08/2022), Obstructive sleep apnea (adult) (pediatric) (10/27/2020), Other idiopathic scoliosis, site unspecified (08/17/2020), Pure hypercholesterolemia, unspecified (06/08/2022), Ulcerative colitis, unspecified, without complications (Multi) (10/11/2022), Unilateral inguinal hernia, without obstruction or gangrene, not specified as recurrent (12/10/2019), Unilateral primary osteoarthritis, right hip (03/05/2020), Unspecified systolic (congestive) heart failure (10/11/2022), Ventral hernia without obstruction or gangrene (11/25/2019), and Vitamin D deficiency, unspecified (10/11/2022).    Surgical History  He has a past surgical history that includes Coronary angioplasty with stent (01/20/2017); Other surgical history (07/19/2021); Other surgical history (12/10/2019); Esophagogastroduodenoscopy; Colonoscopy; Cardiac defibrillator placement; Ankle surgery; and Total hip arthroplasty (Left).     Social History  He reports that he has been smoking cigarettes. He has been exposed to tobacco smoke. He has never used smokeless tobacco. He reports that he does not currently use alcohol. He reports that he does not use drugs.    Family History  Family History[1]     Allergies  Empagliflozin, Penicillins, Ranolazine, Sacubitril-valsartan, and Sulfamethoxazole    Review of Systems  Review of Systems   Constitutional:  Negative for chills and fatigue.   Respiratory:  Negative for chest tightness, shortness of breath and wheezing.    Cardiovascular:  Negative for chest pain and palpitations.   Gastrointestinal:  Negative for constipation, diarrhea and nausea.   Genitourinary:  Negative for frequency and urgency.   Musculoskeletal:   Positive for arthralgias, back pain and myalgias.   Neurological:  Positive for numbness. Negative for dizziness and weakness.   Psychiatric/Behavioral:  Negative for agitation and confusion.         Physical Exam  Physical Exam  Constitutional:       General: He is not in acute distress.     Appearance: Normal appearance.       HENT:      Head: Normocephalic.   Eyes:      Extraocular Movements: Extraocular movements intact.   Musculoskeletal:      Lumbar back: Spasms and tenderness present. Decreased range of motion.      Comments: Lumbar decreased range of motion with flexion extension, pain with lateral bending right and left.  Bilateral lumbar paraspinals and gluteal region tender to palpation, bilateral sacroiliac notch tender to palpation, provocative maneuvers negative, bilateral sit slump test positive with tight nests in hamstrings, bilateral lower extremity reflexes diminished, vibratory sensation intact.  Denies any bowel or bladder incontinence or saddle paresthesia.   Neurological:      General: No focal deficit present.      Mental Status: He is alert and oriented to person, place, and time.      GCS: GCS eye subscore is 4. GCS verbal subscore is 5. GCS motor subscore is 6.      Cranial Nerves: Cranial nerves 2-12 are intact.      Sensory: Sensation is intact.      Motor: Motor function is intact.      Coordination: Coordination is intact.      Gait: Gait abnormal.      Deep Tendon Reflexes:      Reflex Scores:       Patellar reflexes are 2+ on the right side and 2+ on the left side.       Achilles reflexes are 2+ on the right side and 2+ on the left side.     Comments: Antalgic gait   Psychiatric:         Attention and Perception: Attention and perception normal.         Mood and Affect: Mood normal.         Speech: Speech normal.         Behavior: Behavior normal.         Thought Content: Thought content normal.         Cognition and Memory: Cognition normal.         Judgment: Judgment normal.       "     Last Recorded Vitals  Blood pressure 124/62, pulse 69, temperature 36 °C (96.8 °F), temperature source Temporal, resp. rate 10, height 1.626 m (5' 4\"), weight 68 kg (150 lb), SpO2 95%.    Reviewed Images  2/6/2023 lumbar x-ray showed severe degenerative changes with ankylosing spondylitis most likely with facet joint disease and osteopenia     2/3/2023 CT thoracic spine  IMPRESSION:  1. No acute fracture of the thoracic spine. Remote right L1  transverse process mildly displaced fracture.  2. Moderate multilevel degenerative changes most prominent at T10-T11  with superimposed findings of DISH.  3. Emphysema within the imaged lung fields.  4. 4.4 cm ascending thoracic aorta aneurysm.     :  2/3/2023 CT lumbar spine and thoracic spine showed:  FINDINGS:  OSSEOUS STRUCTURES:  There is a mildly distracted transverse fracture identified through  the mid to superior aspect of the L5 vertebral body. A minimally  displaced transverse fracture is seen through the right transverse  process at L4 no additional fractures are seen. The vertebral bodies  are well aligned without evidence of subluxation.     Mild disc space narrowing and bridging osteophyte formation is seen  throughout the visualized thoracolumbar spine. Moderate to severe  facet degenerative changes are seen throughout the lumbar spine.     LOWER THORACIC SPINE:  No gross central canal or neural foraminal narrowing is seen.     T12-L1:  No gross central canal or neural foraminal narrowing is seen.     L1-2:  No gross central canal or neural foraminal narrowing is seen.     L2-3:  No gross central canal or neural foraminal narrowing is seen.     L3-4:  No gross central canal or neural foraminal narrowing is seen.     L4-5:  At least mild central canal narrowing is seen. No significant neural  foraminal narrowing is seen.     L5-S1:  Mild neural foraminal narrowing is seen bilaterally. No significant  central canal narrowing is seen.     ASSOCIATED " STRUCTURES:  Evaluation of the visualized soft tissues of the abdomen is limited  by the lack of intravenous contrast. Within this limitation,  heterogeneous increased density is seen throughout the mid to  inferior aspect of the retroperitoneum, most consistent with  hemorrhage.     IMPRESSION:  1. L5 vertebral body fracture, as above.  2. Right L4 transverse process fracture.  3. Degenerative changes, as described above.    Reviewed Labs  Lab Results   Component Value Date    GLUCOSE 101 (H) 05/05/2025    CALCIUM 9.7 05/05/2025     05/05/2025    K 4.6 05/05/2025    CO2 31 05/05/2025    CL 98 05/05/2025    BUN 17 05/05/2025    CREATININE 0.79 05/05/2025         Assessment/Plan     Horcaio Gupta is a 64 y.o. male here with his wife, recall past medical history of current smoker, depression, hypertension, COPD, L5 compression fracture, ankylosing spondylosis, ulcerative colitis on Remicade infusions, CAD with stents, CHF with ICD implant on Plavix, EMG showed severe L5 radiculopathy with activation denervation, who is here for follow-up of widespread muscle joint pain and chronic lumbar radiculopathy.  He manages his pain with  Gabapentin, Tylenol and Voltaren gel which provides moderate relief of symptoms however the lower back symptoms have been more consistent and frequent.  He also reports pain in his right foot metatarsals denies trauma or injury related to pain.  In the past he has received caudal epidural steroid injection with significant relief of lumbar radiculopathy symptoms.  On physical examination he has lumbar decreased range of motion with lumbar flexion and extension, pain on palpation to the lateral bending, diminished bilateral lower extremity reflexes, bilateral sit slump test positive with tightness in hamstrings.  Symptoms consistent with lumbar spondylosis with radiculopathy.  Will plan for caudal epidural steroid injection after 1 mg Ativan to take 1 hour prior to injection. I  spent time with the patient discussing all of the risks, benefits, and alternatives to this measure. Including but not limited to spinal infection, epidural hematoma/abscess, paralysis, nerve injury, steroid effects, and spinal headache. The patient understands and will schedule a repeat as needed.    Plan to continue with current pain medication regimen as prescribed.  All questions and concerns answered.  Plan to follow-up in 6 to 8 weeks after injection completed.  Plan  At least 50% of the visit was involved in the discussion of the options for treatment. We discussed exercises, medication, interventional therapies and surgery. Healthy life style is essential with patient hard work to achieve the wellness. In addition; discussion with the patient and/or family about any of the diagnostic results, impressions and/or recommended diagnostic studies, prognosis, risks and benefits of treatment options, instructions for treatment and/or follow-up, importance of compliance with chosen treatment options, risk-factor reduction, and patient/family education.     Pool therapy, walking in the pool, at least 3x per week for 30 minutes  Continue self-directed physical therapy  Provided referral to podiatry for right foot metatarsal pain.  Plan for fluoroscopy guided caudal epidural steroid injection after 2 mg Ativan 1 hour prior to injection.  Healthy lifestyle and anti-inflammatory diet in addition to weight control discussed with the patient  Alternative chronic pain therapies was discussed, encouraged and information was handed  Return to Clinic 6 to 8 weeks after injection completed     *Please note this report has been produced using speech recognition software and may contain errors related to that system including grammar, punctuation and spelling as well as words and phrases that may be inappropriate. If there are questions or concerns, please feel free to contact me to clarify.      I spent 43 minutes in the  professional and overall care of this patient.       DAVID Teague-JERARDO              [1]   Family History  Problem Relation Name Age of Onset    Stroke Mother      Diabetes Mother      Heart failure Mother      Hypertension Mother      Heart failure Father      Hypertension Father      Diabetes Brother      Other (C A B G x1) Brother

## 2025-06-20 ENCOUNTER — APPOINTMENT (OUTPATIENT)
Dept: PAIN MEDICINE | Facility: CLINIC | Age: 65
End: 2025-06-20
Payer: MEDICARE

## 2025-06-20 VITALS
WEIGHT: 150 LBS | HEART RATE: 69 BPM | HEIGHT: 64 IN | BODY MASS INDEX: 25.61 KG/M2 | SYSTOLIC BLOOD PRESSURE: 124 MMHG | DIASTOLIC BLOOD PRESSURE: 62 MMHG | TEMPERATURE: 96.8 F | RESPIRATION RATE: 10 BRPM | OXYGEN SATURATION: 95 %

## 2025-06-20 DIAGNOSIS — M45.0 ANKYLOSING SPONDYLITIS OF MULTIPLE SITES IN SPINE (MULTI): ICD-10-CM

## 2025-06-20 DIAGNOSIS — M47.27 LUMBOSACRAL SPONDYLOSIS WITH RADICULOPATHY: Primary | ICD-10-CM

## 2025-06-20 PROCEDURE — 3008F BODY MASS INDEX DOCD: CPT | Performed by: NURSE PRACTITIONER

## 2025-06-20 PROCEDURE — 1125F AMNT PAIN NOTED PAIN PRSNT: CPT | Performed by: NURSE PRACTITIONER

## 2025-06-20 PROCEDURE — 99215 OFFICE O/P EST HI 40 MIN: CPT | Performed by: NURSE PRACTITIONER

## 2025-06-20 PROCEDURE — 4004F PT TOBACCO SCREEN RCVD TLK: CPT | Performed by: NURSE PRACTITIONER

## 2025-06-20 PROCEDURE — 1159F MED LIST DOCD IN RCRD: CPT | Performed by: NURSE PRACTITIONER

## 2025-06-20 PROCEDURE — 3074F SYST BP LT 130 MM HG: CPT | Performed by: NURSE PRACTITIONER

## 2025-06-20 PROCEDURE — 3078F DIAST BP <80 MM HG: CPT | Performed by: NURSE PRACTITIONER

## 2025-06-20 RX ORDER — LORAZEPAM 2 MG/1
TABLET ORAL
Qty: 1 TABLET | Refills: 0 | Status: SHIPPED | OUTPATIENT
Start: 2025-06-20

## 2025-06-20 SDOH — ECONOMIC STABILITY: FOOD INSECURITY: WITHIN THE PAST 12 MONTHS, THE FOOD YOU BOUGHT JUST DIDN'T LAST AND YOU DIDN'T HAVE MONEY TO GET MORE.: NEVER TRUE

## 2025-06-20 SDOH — ECONOMIC STABILITY: FOOD INSECURITY: WITHIN THE PAST 12 MONTHS, YOU WORRIED THAT YOUR FOOD WOULD RUN OUT BEFORE YOU GOT MONEY TO BUY MORE.: NEVER TRUE

## 2025-06-20 ASSESSMENT — ENCOUNTER SYMPTOMS
CONFUSION: 0
CHILLS: 0
NUMBNESS: 1
DIZZINESS: 0
CHEST TIGHTNESS: 0
WHEEZING: 0
DIARRHEA: 0
OCCASIONAL FEELINGS OF UNSTEADINESS: 0
WEAKNESS: 0
SHORTNESS OF BREATH: 0
FATIGUE: 0
ARTHRALGIAS: 1
NAUSEA: 0
FREQUENCY: 0
BACK PAIN: 1
DEPRESSION: 0
MYALGIAS: 1
CONSTIPATION: 0
LOSS OF SENSATION IN FEET: 1
PALPITATIONS: 0
AGITATION: 0

## 2025-06-20 ASSESSMENT — PAIN - FUNCTIONAL ASSESSMENT: PAIN_FUNCTIONAL_ASSESSMENT: 0-10

## 2025-06-20 ASSESSMENT — LIFESTYLE VARIABLES
HOW OFTEN DO YOU HAVE A DRINK CONTAINING ALCOHOL: NEVER
AUDIT-C TOTAL SCORE: 0
HOW OFTEN DO YOU HAVE SIX OR MORE DRINKS ON ONE OCCASION: NEVER
HOW MANY STANDARD DRINKS CONTAINING ALCOHOL DO YOU HAVE ON A TYPICAL DAY: PATIENT DOES NOT DRINK
SKIP TO QUESTIONS 9-10: 1

## 2025-06-20 ASSESSMENT — COLUMBIA-SUICIDE SEVERITY RATING SCALE - C-SSRS
1. IN THE PAST MONTH, HAVE YOU WISHED YOU WERE DEAD OR WISHED YOU COULD GO TO SLEEP AND NOT WAKE UP?: NO
6. HAVE YOU EVER DONE ANYTHING, STARTED TO DO ANYTHING, OR PREPARED TO DO ANYTHING TO END YOUR LIFE?: NO
2. HAVE YOU ACTUALLY HAD ANY THOUGHTS OF KILLING YOURSELF?: NO

## 2025-06-20 ASSESSMENT — PATIENT HEALTH QUESTIONNAIRE - PHQ9
2. FEELING DOWN, DEPRESSED OR HOPELESS: NOT AT ALL
SUM OF ALL RESPONSES TO PHQ9 QUESTIONS 1 & 2: 0
1. LITTLE INTEREST OR PLEASURE IN DOING THINGS: NOT AT ALL

## 2025-06-20 ASSESSMENT — PAIN SCALES - GENERAL
PAINLEVEL_OUTOF10: 6
PAINLEVEL_OUTOF10: 6

## 2025-06-20 ASSESSMENT — ANXIETY QUESTIONNAIRES
6. BECOMING EASILY ANNOYED OR IRRITABLE: NOT AT ALL
4. TROUBLE RELAXING: NOT AT ALL
7. FEELING AFRAID AS IF SOMETHING AWFUL MIGHT HAPPEN: NOT AT ALL
5. BEING SO RESTLESS THAT IT IS HARD TO SIT STILL: NOT AT ALL
1. FEELING NERVOUS, ANXIOUS, OR ON EDGE: NOT AT ALL
GAD7 TOTAL SCORE: 0
2. NOT BEING ABLE TO STOP OR CONTROL WORRYING: NOT AT ALL
IF YOU CHECKED OFF ANY PROBLEMS ON THIS QUESTIONNAIRE, HOW DIFFICULT HAVE THESE PROBLEMS MADE IT FOR YOU TO DO YOUR WORK, TAKE CARE OF THINGS AT HOME, OR GET ALONG WITH OTHER PEOPLE: NOT DIFFICULT AT ALL
3. WORRYING TOO MUCH ABOUT DIFFERENT THINGS: NOT AT ALL

## 2025-06-20 ASSESSMENT — PAIN DESCRIPTION - DESCRIPTORS: DESCRIPTORS: SHARP

## 2025-06-20 NOTE — PROGRESS NOTES
Opioid Risk Assessment Score 0/26    Pablo Each Box that Applies Female Male   FAMILY HISTORY OF SUBSTANCE ABUSE  Pablo the boxes that applies   Alcohol ?  1    ? 3   Illegal drugs ?  2 ? 3   Rx drugs ?  4 ? 4   PERSONAL HISTORY OF SUBSTNACE ABUSE   Alcohol ?  3 ?  3   Illegal drugs ?  4 ?  4    Rx drugs ?  5 ?  5   Age Between 16-45 years ?  1 ?  1   History of Preadolescent Sexual Abuse ?  3 ?  0   PSYCHOLOGIC DISEASE   ADD, OCD, bipolar, schizophrenia  Opioid Risk Assessment  ?  2 ?  2   Depression ?  1 ?  1   Scoring Totals       Scoring (Risk)  0-3 - Low  4-7 - Moderate  8 - High  Score 0/26

## 2025-06-24 ENCOUNTER — HOSPITAL ENCOUNTER (OUTPATIENT)
Dept: CARDIOLOGY | Facility: CLINIC | Age: 65
Discharge: HOME | End: 2025-06-24
Payer: MEDICARE

## 2025-06-24 DIAGNOSIS — Z95.810 PRESENCE OF AUTOMATIC CARDIOVERTER/DEFIBRILLATOR (AICD): ICD-10-CM

## 2025-06-24 DIAGNOSIS — I47.20 VT (VENTRICULAR TACHYCARDIA) (MULTI): ICD-10-CM

## 2025-06-24 PROCEDURE — 93296 REM INTERROG EVL PM/IDS: CPT

## 2025-06-27 ENCOUNTER — TELEPHONE (OUTPATIENT)
Dept: CARDIOLOGY | Facility: CLINIC | Age: 65
End: 2025-06-27
Payer: MEDICARE

## 2025-06-27 ENCOUNTER — TELEPHONE (OUTPATIENT)
Dept: PAIN MEDICINE | Facility: CLINIC | Age: 65
End: 2025-06-27
Payer: MEDICARE

## 2025-06-27 NOTE — TELEPHONE ENCOUNTER
Spouse Maya called and left voicemail. Pt scheduled for injection on 7/8 at [facility]. Spouse inquiring if ASA needs to be held prior. Please advise. Thank you.

## 2025-06-27 NOTE — TELEPHONE ENCOUNTER
Calling to see if theres an update on his cardiologist ok with him to stop aspirin for his procedure.

## 2025-07-08 ENCOUNTER — HOSPITAL ENCOUNTER (OUTPATIENT)
Dept: PAIN MEDICINE | Facility: CLINIC | Age: 65
Discharge: HOME | End: 2025-07-08
Payer: MEDICARE

## 2025-07-08 VITALS
DIASTOLIC BLOOD PRESSURE: 71 MMHG | SYSTOLIC BLOOD PRESSURE: 109 MMHG | OXYGEN SATURATION: 92 % | TEMPERATURE: 96.6 F | RESPIRATION RATE: 18 BRPM | HEART RATE: 63 BPM

## 2025-07-08 DIAGNOSIS — M47.27 LUMBOSACRAL SPONDYLOSIS WITH RADICULOPATHY: ICD-10-CM

## 2025-07-08 PROCEDURE — 62323 NJX INTERLAMINAR LMBR/SAC: CPT | Performed by: ANESTHESIOLOGY

## 2025-07-08 PROCEDURE — 2550000001 HC RX 255 CONTRASTS: Performed by: ANESTHESIOLOGY

## 2025-07-08 PROCEDURE — 7100000009 HC PHASE TWO TIME - INITIAL BASE CHARGE

## 2025-07-08 PROCEDURE — 7100000010 HC PHASE TWO TIME - EACH INCREMENTAL 1 MINUTE

## 2025-07-08 PROCEDURE — 2500000005 HC RX 250 GENERAL PHARMACY W/O HCPCS: Performed by: ANESTHESIOLOGY

## 2025-07-08 PROCEDURE — 2500000004 HC RX 250 GENERAL PHARMACY W/ HCPCS (ALT 636 FOR OP/ED): Performed by: ANESTHESIOLOGY

## 2025-07-08 RX ORDER — SODIUM CHLORIDE 9 MG/ML
INJECTION, SOLUTION INTRAMUSCULAR; INTRAVENOUS; SUBCUTANEOUS AS NEEDED
Status: COMPLETED | OUTPATIENT
Start: 2025-07-08 | End: 2025-07-08

## 2025-07-08 RX ORDER — METHYLPREDNISOLONE ACETATE 80 MG/ML
INJECTION, SUSPENSION INTRA-ARTICULAR; INTRALESIONAL; INTRAMUSCULAR; SOFT TISSUE AS NEEDED
Status: COMPLETED | OUTPATIENT
Start: 2025-07-08 | End: 2025-07-08

## 2025-07-08 RX ORDER — LIDOCAINE HYDROCHLORIDE 5 MG/ML
INJECTION, SOLUTION INFILTRATION; INTRAVENOUS AS NEEDED
Status: COMPLETED | OUTPATIENT
Start: 2025-07-08 | End: 2025-07-08

## 2025-07-08 RX ADMIN — IOHEXOL 3 ML: 300 INJECTION, SOLUTION INTRAVENOUS at 14:34

## 2025-07-08 RX ADMIN — METHYLPREDNISOLONE ACETATE 80 MG: 80 INJECTION, SUSPENSION INTRA-ARTICULAR; INTRALESIONAL; INTRAMUSCULAR; SOFT TISSUE at 14:35

## 2025-07-08 RX ADMIN — LIDOCAINE HYDROCHLORIDE 10 ML: 5 INJECTION, SOLUTION INFILTRATION at 14:33

## 2025-07-08 RX ADMIN — SODIUM CHLORIDE 10 ML: 9 INJECTION, SOLUTION INTRAMUSCULAR; INTRAVENOUS; SUBCUTANEOUS at 14:33

## 2025-07-08 ASSESSMENT — PAIN SCALES - GENERAL
PAINLEVEL_OUTOF10: 5 - MODERATE PAIN
PAINLEVEL_OUTOF10: 0 - NO PAIN

## 2025-07-08 ASSESSMENT — PAIN - FUNCTIONAL ASSESSMENT
PAIN_FUNCTIONAL_ASSESSMENT: 0-10
PAIN_FUNCTIONAL_ASSESSMENT: 0-10

## 2025-07-14 ENCOUNTER — APPOINTMENT (OUTPATIENT)
Dept: PRIMARY CARE | Facility: CLINIC | Age: 65
End: 2025-07-14
Payer: MEDICARE

## 2025-07-14 VITALS
HEART RATE: 83 BPM | SYSTOLIC BLOOD PRESSURE: 115 MMHG | BODY MASS INDEX: 24.92 KG/M2 | DIASTOLIC BLOOD PRESSURE: 73 MMHG | WEIGHT: 146 LBS | HEIGHT: 64 IN

## 2025-07-14 DIAGNOSIS — E55.9 VITAMIN D DEFICIENCY: ICD-10-CM

## 2025-07-14 DIAGNOSIS — I48.91 ATRIAL FIBRILLATION, UNSPECIFIED TYPE (MULTI): ICD-10-CM

## 2025-07-14 DIAGNOSIS — E78.00 HYPERCHOLESTEROLEMIA: ICD-10-CM

## 2025-07-14 DIAGNOSIS — J44.9 CHRONIC OBSTRUCTIVE PULMONARY DISEASE, UNSPECIFIED COPD TYPE (MULTI): ICD-10-CM

## 2025-07-14 DIAGNOSIS — M45.0 ANKYLOSING SPONDYLITIS OF MULTIPLE SITES IN SPINE (MULTI): ICD-10-CM

## 2025-07-14 DIAGNOSIS — I10 HTN (HYPERTENSION), BENIGN: Primary | ICD-10-CM

## 2025-07-14 DIAGNOSIS — R73.01 IFG (IMPAIRED FASTING GLUCOSE): ICD-10-CM

## 2025-07-14 PROCEDURE — 3074F SYST BP LT 130 MM HG: CPT | Performed by: INTERNAL MEDICINE

## 2025-07-14 PROCEDURE — G2211 COMPLEX E/M VISIT ADD ON: HCPCS | Performed by: INTERNAL MEDICINE

## 2025-07-14 PROCEDURE — 99214 OFFICE O/P EST MOD 30 MIN: CPT | Performed by: INTERNAL MEDICINE

## 2025-07-14 PROCEDURE — 3008F BODY MASS INDEX DOCD: CPT | Performed by: INTERNAL MEDICINE

## 2025-07-14 PROCEDURE — 3078F DIAST BP <80 MM HG: CPT | Performed by: INTERNAL MEDICINE

## 2025-07-14 PROCEDURE — 1159F MED LIST DOCD IN RCRD: CPT | Performed by: INTERNAL MEDICINE

## 2025-07-14 PROCEDURE — 4004F PT TOBACCO SCREEN RCVD TLK: CPT | Performed by: INTERNAL MEDICINE

## 2025-07-14 PROCEDURE — 1160F RVW MEDS BY RX/DR IN RCRD: CPT | Performed by: INTERNAL MEDICINE

## 2025-07-14 ASSESSMENT — ENCOUNTER SYMPTOMS
CONFUSION: 0
COUGH: 0
WHEEZING: 0
ALLERGIC/IMMUNOLOGIC NEGATIVE: 1
EYE DISCHARGE: 0
BACK PAIN: 1
DYSURIA: 0
ADENOPATHY: 0
NAUSEA: 0
NEUROLOGICAL NEGATIVE: 1
ABDOMINAL DISTENTION: 0
HEADACHES: 0
HEMATOLOGIC/LYMPHATIC NEGATIVE: 1
ABDOMINAL PAIN: 0
CONSTIPATION: 0
PSYCHIATRIC NEGATIVE: 1
RESPIRATORY NEGATIVE: 1
NECK STIFFNESS: 0
EYES NEGATIVE: 1
FEVER: 0
VOMITING: 0
PALPITATIONS: 0
CONSTITUTIONAL NEGATIVE: 1
CHILLS: 0
DIARRHEA: 0
NUMBNESS: 0
AGITATION: 0
LIGHT-HEADEDNESS: 0
JOINT SWELLING: 0
SHORTNESS OF BREATH: 0
CARDIOVASCULAR NEGATIVE: 1
GASTROINTESTINAL NEGATIVE: 1
ENDOCRINE NEGATIVE: 1

## 2025-07-14 NOTE — LETTER
July 14, 2025     Patient: Horacio Gupta   YOB: 1960   Date of Visit: 7/14/2025       To Whom It May Concern:    Horacio Gupta was seen in my clinic on 7/14/2025 at 11:00 am.Due to his medical condition he would benefit from an air conditioner..    If you have any questions or concerns, please don't hesitate to call.         Sincerely,         Clif Beckett MD        CC: No Recipients

## 2025-07-14 NOTE — PROGRESS NOTES
Subjective   Patient ID: Horacio Gupta is a 64 y.o. male who presents for Follow-up (4 MO FU LAB).  HERE FOR FU WITH LABS FEELS FINE        Review of Systems   Constitutional: Negative.  Negative for chills and fever.   HENT: Negative.  Negative for congestion.    Eyes: Negative.  Negative for discharge.   Respiratory: Negative.  Negative for cough, shortness of breath and wheezing.    Cardiovascular: Negative.  Negative for chest pain, palpitations and leg swelling.   Gastrointestinal: Negative.  Negative for abdominal distention, abdominal pain, constipation, diarrhea, nausea and vomiting.   Endocrine: Negative.    Genitourinary: Negative.  Negative for dysuria and urgency.   Musculoskeletal:  Positive for back pain. Negative for joint swelling and neck stiffness.   Skin: Negative.  Negative for rash.   Allergic/Immunologic: Negative.  Negative for immunocompromised state.   Neurological: Negative.  Negative for light-headedness, numbness and headaches.   Hematological: Negative.  Negative for adenopathy.   Psychiatric/Behavioral: Negative.  Negative for agitation, behavioral problems and confusion.    All other systems reviewed and are negative.      Objective   Physical Exam  Vitals reviewed.   Constitutional:       General: He is not in acute distress.     Appearance: Normal appearance.   HENT:      Head: Normocephalic and atraumatic.      Nose: Nose normal.   Eyes:      Conjunctiva/sclera: Conjunctivae normal.      Pupils: Pupils are equal, round, and reactive to light.   Neck:      Vascular: No carotid bruit.   Cardiovascular:      Rate and Rhythm: Normal rate and regular rhythm.      Pulses: Normal pulses.      Heart sounds:      No gallop.   Pulmonary:      Effort: Pulmonary effort is normal. No respiratory distress.      Breath sounds: Normal breath sounds. No wheezing.   Abdominal:      General: Bowel sounds are normal.      Palpations: Abdomen is soft.      Tenderness: There is no abdominal  "tenderness.   Musculoskeletal:         General: Tenderness (lower bacvk) present. Normal range of motion.      Cervical back: Normal range of motion. No rigidity.   Lymphadenopathy:      Cervical: No cervical adenopathy.   Skin:     General: Skin is warm.      Findings: No rash.   Neurological:      General: No focal deficit present.      Mental Status: He is alert and oriented to person, place, and time.   Psychiatric:         Mood and Affect: Mood normal.         Behavior: Behavior normal.       /73 (BP Location: Left arm, Patient Position: Sitting)   Pulse 83   Ht 1.626 m (5' 4\")   Wt 66.2 kg (146 lb)   BMI 25.06 kg/m²    PSA, TOTAL   Date/Time Value Ref Range Status   03/03/2025 07:21 AM 0.87 < OR = 4.00 ng/mL Final     Comment:     The total PSA value from this assay system is   standardized against the WHO standard. The test   result will be approximately 20% lower when compared   to the equimolar-standardized total PSA (Doyle   [place]). Comparison of serial PSA results should be   interpreted with this fact in mind.     This test was performed using the Siemens   chemiluminescent method. Values obtained from   different assay methods cannot be used  interchangeably. PSA levels, regardless of  value, should not be interpreted as absolute  evidence of the presence or absence of disease.       HEMOGLOBIN A1c   Date/Time Value Ref Range Status   05/05/2025 07:11 AM 5.9 (H) <5.7 % Final     Comment:     For someone without known diabetes, a hemoglobin   A1c value between 5.7% and 6.4% is consistent with  prediabetes and should be confirmed with a   follow-up test.     For someone with known diabetes, a value <7%  indicates that their diabetes is well controlled. A1c  targets should be individualized based on duration of  diabetes, age, comorbid conditions, and other  considerations.     This assay result is consistent with an increased risk  of diabetes.     Currently, no consensus exists regarding " use of  hemoglobin A1c for diagnosis of diabetes for children.          Assessment/Plan   Problem List Items Addressed This Visit       Chronic obstructive pulmonary disease (Multi)    Relevant Orders    Comprehensive Metabolic Panel    HTN (hypertension), benign - Primary    Relevant Orders    Comprehensive Metabolic Panel    Hypercholesterolemia    Relevant Orders    Comprehensive Metabolic Panel    Vitamin D deficiency    Relevant Orders    Comprehensive Metabolic Panel    Vitamin D 25-Hydroxy,Total (for eval of Vitamin D levels)    IFG (impaired fasting glucose)    Relevant Orders    Comprehensive Metabolic Panel    Hemoglobin A1C    Ankylosing spondylitis of multiple sites in spine (Multi)    Relevant Orders    Referral to Physical Therapy          HTN addressed as follow:    MONITOR BP   GOAL BP LOWER THAN 130/80  LOW SALT  EXERCISE DAILY  Diabetes Mellitus/IFG addressed as follow:    [address]  HGA1C GOAL LESS THAN 7  LOSE WT  EXERCISE DAILY    HAS QUIT SMOKING    Labs reviewed with pt    Geovanni 4 mo bw

## 2025-07-31 ENCOUNTER — EVALUATION (OUTPATIENT)
Dept: PHYSICAL THERAPY | Facility: CLINIC | Age: 65
End: 2025-07-31
Payer: MEDICARE

## 2025-07-31 DIAGNOSIS — M45.0 ANKYLOSING SPONDYLITIS OF MULTIPLE SITES IN SPINE (MULTI): Primary | ICD-10-CM

## 2025-07-31 DIAGNOSIS — R26.9 ABNORMALITY OF GAIT: ICD-10-CM

## 2025-07-31 PROCEDURE — 97110 THERAPEUTIC EXERCISES: CPT | Mod: GP

## 2025-07-31 PROCEDURE — 97161 PT EVAL LOW COMPLEX 20 MIN: CPT | Mod: GP

## 2025-07-31 ASSESSMENT — ENCOUNTER SYMPTOMS
OCCASIONAL FEELINGS OF UNSTEADINESS: 0
LOSS OF SENSATION IN FEET: 1
DEPRESSION: 0

## 2025-07-31 ASSESSMENT — PAIN SCALES - GENERAL: PAINLEVEL_OUTOF10: 6

## 2025-07-31 ASSESSMENT — PAIN - FUNCTIONAL ASSESSMENT: PAIN_FUNCTIONAL_ASSESSMENT: 0-10

## 2025-07-31 NOTE — PROGRESS NOTES
"Physical Therapy          Physical Therapy Evaluation          Patient Name: Horacio Gupta     MRN: [id number]     : 1960     Referring Physician: Clif Beckett     Today's Date: 2025     Time Calculation  Start Time: 1442  Stop Time: 1530  Time Calculation (min): 48 min     PT Evaluation Time Entry  PT Evaluation (Low) Time Entry: 38     PT Therapeutic Procedures Time Entry  Therapeutic Exercise Time Entry: 10                       General     Reason for Referral: Ankylosing spondylitis of multiple site in spine  Referred By: Dr. Beckett  General Comment: Eval          Current Problem     Problem List Items Addressed This Visit           ICD-10-CM       Musculoskeletal and Injuries    Ankylosing spondylitis of multiple sites in spine (Multi) - Primary M45.0    Relevant Orders    Follow Up In Physical Therapy       Symptoms and Signs    Abnormality of gait R26.9    Relevant Orders    Follow Up In Physical Therapy                SUBJECTIVE     Subjective      Patient reports that he has had back pain \"my whole life\". Pt reports having a fall in the 90's with multiple spinal fractures. Pt reports that his lumbar spine has fused on its own over time. Pt is receiving injections and infusions to manage the pain, and they are successful, but they do not eliminate pain. This is leading to difficulty with ambulation, sleep, transfers, lifting objects.  Pain is reported to range 5-10/10 with daily activities.  Patient states that their goal is to decrease stiffness and pain with physical therapy intervention.          Prior level of function: Independent          Patient's name and  were confirmed this date.          Precautions  STEADI Fall Risk Score (The score of 4 or more indicates an increased risk of falling): 1  Precautions Comment: COPD, AAA, CHF, CVA, HTN, avoid peripheralization of symptoms           Pain     Pain Assessment: 0-10  0-10 (Numeric) Pain Score: 6  Pain Type: Chronic " pain  Pain Location: Back  Pain Orientation: Lower, Right, Left          OBJECTIVE:       Lower Extremity Strength:?   MMT 5/5 max??  RIGHT?  LEFT?    Hip Abduction?  ? 4 ? 4   Hip ER?  ?  ?    Hip IR?  ?  ?    Hip Flexion?  ?? 4 ?? 4-   Hip Ext.?  ??  ??    ?Hip Add.?  ?? 4- ?? 4-   ?Knee Flexion?  ?? 4 ?? 4   ?Knee Ext.??  ?? 4- ?? 4-   Dorsiflexion?  ?  ?    Plantar Flexion?  ?  ?    Eversion?  ?  ?    Inversion?  ?  ?    ?  ?  ?    ?   ?     Sensation: diminished recall of S1 bilaterally          Posture: head forward, rounded shoulders, left rotation of cervical spine           Palpation: TTP of paraspinals          Special tests:     SLR test: positive on L       Other Measures  Oswestry Disablity Index (ASH): 22           TREATMENT:       EXERCISES   BKFO x10 each  Small range LTR x10  AAROM SKTC x5 each       HEP   Access Code: [email or web address]  URL: [email or web address]  Date: 07/31/2025  Prepared by: Nehemiah Keita    Exercises  - Supine Lower Trunk Rotation  - 2 x daily - 7 x weekly - 1-2 sets - 5-10 reps  - Supine Single Knee to Chest Stretch  - 2 x daily - 7 x weekly - 1-2 sets - 5-10 reps  - Bent Knee Fallouts  - 2 x daily - 7 x weekly - 1-2 sets - 5-10 reps        PATIENT EDUCATION:   Outpatient Education  Individual(s) Educated: Patient  Education Provided: Anatomy, Body Mechanics, Home Exercise Program, POC, Posture  Risk and Benefits Discussed with Patient/Caregiver/Other: yes  Patient/Caregiver Demonstrated Understanding: yes  Plan of Care Discussed and Agreed Upon: yes  Patient Response to Education: Patient/Caregiver Verbalized Understanding of Information         ASSESSEMENT     PT Assessment  PT Assessment Results: Decreased strength, Decreased range of motion, Decreased mobility, Pain  Rehab Prognosis: Fair  Evaluation/Treatment Tolerance: Patient limited by pain    Pt presents due to a long history of low back pain c decreased strength, limited AROM (based on observation),  decreased ability to perform activities of choice and chores around home, ASH of 22/50, indicating the need for PT services to decrease pain, increase strength, improve gait efficiency, decrease need for assistance from others.          Rehab potential: Fair          PLAN     Treatment/Interventions: Cryotherapy, Dry needling, Education/ Instruction, Gait training, Manual therapy, Neuromuscular re-education, Aquatic therapy, Self care/ home management, Therapeutic activities, Therapeutic exercises  PT Plan: Skilled PT  PT Frequency: 2 times per week (1-2x/week)  Duration: 5 weeks  Onset Date: 07/14/25  Certification Period Start Date: 07/31/25  Rehab Potential: Fair  Plan of Care Agreement: Patient          Pt. Is in agreement with PT plan.     Goals:     Active       PT Problem       Pt will be compliant c HEP for continuity of POC, in 2 weeks.          Start:  07/31/25    Expected End:  10/29/25            Pt will have <=2/10 pain for one week, for improved QOL, in 4 weeks.         Start:  07/31/25    Expected End:  10/29/25            Pt will increase BLE and trunk strength to 4+/5 or greater for improved functional mobility and decreased risk of falls, in 5 weeks.         Start:  07/31/25    Expected End:  10/29/25            Pt will score </=17/50 (MCID) on Revised Oswestry, indicating improved pain and functional ability, in 5 weeks.         Start:  07/31/25    Expected End:  10/29/25            Patient states that their goal is to decrease stiffness and pain with physical therapy intervention.         Start:  07/31/25    Expected End:  10/29/25

## 2025-08-06 ENCOUNTER — TREATMENT (OUTPATIENT)
Dept: PHYSICAL THERAPY | Facility: CLINIC | Age: 65
End: 2025-08-06
Payer: MEDICARE

## 2025-08-06 DIAGNOSIS — M45.0 ANKYLOSING SPONDYLITIS OF MULTIPLE SITES IN SPINE (MULTI): ICD-10-CM

## 2025-08-06 DIAGNOSIS — R26.9 ABNORMALITY OF GAIT: ICD-10-CM

## 2025-08-06 PROCEDURE — 97113 AQUATIC THERAPY/EXERCISES: CPT | Mod: GP,CQ | Performed by: PHYSICAL THERAPY ASSISTANT

## 2025-08-06 ASSESSMENT — PAIN - FUNCTIONAL ASSESSMENT: PAIN_FUNCTIONAL_ASSESSMENT: 0-10

## 2025-08-06 ASSESSMENT — PAIN SCALES - GENERAL: PAINLEVEL_OUTOF10: 6

## 2025-08-06 NOTE — PROGRESS NOTES
"Physical Therapy    Physical Therapy Treatment    Patient Name: Horacio Gupta  MRN: [id number]  Today's Date: 2025  Time Calculation  Start Time: 0830  Stop Time: 15  Time Calculation (min): 45 min  PT Therapeutic Procedures Time Entry  Aquatic Therapy Time Entry: 43                   Current Problem  Problem List Items Addressed This Visit           ICD-10-CM       Musculoskeletal and Injuries    Ankylosing spondylitis of multiple sites in spine (Multi) M45.0       Symptoms and Signs    Abnormality of gait R26.9        General  Reason for Referral: Ankylosing spondylitis of multiple site in spine  Referred By: Dr. Beckett  General Comment: VISIT 2    Subjective  Patient reports no falls and states 6/10 low back pain.    Precautions  Precautions  Precautions Comment: COPD, AAA, CHF, CVA, HTN, avoid peripheralization of symptoms    Pain  Pain Assessment: 0-10  0-10 (Numeric) Pain Score: 6  Pain Type: Chronic pain  Pain Location: Back  Pain Orientation: Lower, Right, Left    Objective  AQUATICS:  SIDE STEPPING x 3 laps  Heel Raises x 12  Squats x 12  Hip Flexion x 12  Hip abd/add x 12  Standing march Steps x 12  DB ROLLS  [place]  30\" ea. Dir  ABDOMINALS  sm blue board x 12 ea.  Flexion/push/pull  UE PADDLES x 20 each Flex/ext, abd/add, H. Abd/add, push/pull  OPEN  100% UNLOADING 1 noodle  Bike x 5  Hip abd/add 5'  Hang 5'  Gradual Exit 3'        Treatments:    OP Education      Assessment  Patient orientated to pool.  Patient has good TrA and keeps intact with there-ex.  Patient needing one UE support with LE there-ex and with 100% unloading.    Patient has good form/understanding with there-ex and keeps body in good alignment.  Continue with core stability while performing dyn activity to decrease back pain.  Patient verified by name and .    Plan Continue with core stability to improve standing, ambulation, ADL.  Treatment/Interventions: Cryotherapy, Dry needling, Education/ Instruction, Gait " training, Manual therapy, Neuromuscular re-education, Aquatic therapy, Self care/ home management, Therapeutic activities, Therapeutic exercises  PT Plan: Skilled PT  PT Frequency: 2 times per week (1-2x/week)  Duration: 5 weeks  Onset Date: 07/14/25  Certification Period Start Date: 07/31/25  Rehab Potential: Fair  Plan of Care Agreement: Patient    Active       PT Problem       Pt will be compliant c HEP for continuity of POC, in 2 weeks.          Start:  07/31/25    Expected End:  10/29/25            Pt will have <=2/10 pain for one week, for improved QOL, in 4 weeks.         Start:  07/31/25    Expected End:  10/29/25            Pt will increase BLE and trunk strength to 4+/5 or greater for improved functional mobility and decreased risk of falls, in 5 weeks.         Start:  07/31/25    Expected End:  10/29/25            Pt will score </=17/50 (MCID) on Revised Oswestry, indicating improved pain and functional ability, in 5 weeks.         Start:  07/31/25    Expected End:  10/29/25            Patient states that their goal is to decrease stiffness and pain with physical therapy intervention.         Start:  07/31/25    Expected End:  10/29/25

## 2025-08-08 ENCOUNTER — TREATMENT (OUTPATIENT)
Dept: PHYSICAL THERAPY | Facility: CLINIC | Age: 65
End: 2025-08-08
Payer: MEDICARE

## 2025-08-08 DIAGNOSIS — M45.0 ANKYLOSING SPONDYLITIS OF MULTIPLE SITES IN SPINE (MULTI): ICD-10-CM

## 2025-08-08 DIAGNOSIS — R26.9 ABNORMALITY OF GAIT: ICD-10-CM

## 2025-08-08 PROCEDURE — 97113 AQUATIC THERAPY/EXERCISES: CPT | Mod: GP,CQ | Performed by: PHYSICAL THERAPY ASSISTANT

## 2025-08-08 ASSESSMENT — PAIN SCALES - GENERAL: PAINLEVEL_OUTOF10: 6

## 2025-08-08 ASSESSMENT — PAIN - FUNCTIONAL ASSESSMENT: PAIN_FUNCTIONAL_ASSESSMENT: 0-10

## 2025-08-08 NOTE — PROGRESS NOTES
"Physical Therapy    Physical Therapy Treatment    Patient Name: Horacio Gupta  MRN: [id number]  Today's Date: 2025  Time Calculation  Start Time: 1100  Stop Time: 1145  Time Calculation (min): 45 min  PT Therapeutic Procedures Time Entry  Aquatic Therapy Time Entry: 43                   Current Problem  Problem List Items Addressed This Visit           ICD-10-CM       Musculoskeletal and Injuries    Ankylosing spondylitis of multiple sites in spine (Multi) M45.0       Symptoms and Signs    Abnormality of gait R26.9        General  Reason for Referral: Ankylosing spondylitis of multiple site in spine  Referred By: Dr. Beckett  General Comment: VISIT 3    Subjective  Patient reports no falls and states 6/10 low back pain. Post aquatics patient states 4/10 low back pain.    Precautions  Precautions  Precautions Comment: COPD, AAA, CHF, CVA, HTN, avoid peripheralization of symptoms    Pain  Pain Assessment: 0-10  0-10 (Numeric) Pain Score: 6  Pain Type: Chronic pain  Pain Location: Back  Pain Orientation: Lower, Right, Left    Objective     Objective  AQUATICS:  SIDE STEPPING x 3 laps  Heel Raises x 15  Squats x 15  Hip Flexion x 15  Hip abd/add x 15  Standing march Steps x 15  DB ROLLS  [place]  30\" ea. Dir  ABDOMINALS  sm blue board x 15 ea.  Flexion/push/pull  UE PADDLES x 20 each Flex/ext, abd/add, H. Abd/add, push/pull  L1  100% UNLOADING 1 noodle  Bike x 5  Hip abd/add 5'  Hang 5'  Gradual Exit 3'    Treatments:    OP Education      Assessment  Patient tolerated treatment without increased back pain.   Patient needing one UE support with LE there-ex and with 100% unloading.  Patient has good TrA and keeps intact with there-ex.  Patient has good form/understanding with there-ex and keeps body in good alignment.  Continue with core stability while performing dyn activity to decrease back pain.  Patient verified by name and .    Plan  Continue with core stability to improve standing, ambulation, " sleeping.  Treatment/Interventions: Cryotherapy, Dry needling, Education/ Instruction, Gait training, Manual therapy, Neuromuscular re-education, Aquatic therapy, Self care/ home management, Therapeutic activities, Therapeutic exercises  PT Plan: Skilled PT  PT Frequency: 2 times per week (1-2x/week)  Duration: 5 weeks  Onset Date: 07/14/25  Certification Period Start Date: 07/31/25  Rehab Potential: Fair  Plan of Care Agreement: Patient    Active       PT Problem       Pt will be compliant c HEP for continuity of POC, in 2 weeks.          Start:  07/31/25    Expected End:  10/29/25            Pt will have <=2/10 pain for one week, for improved QOL, in 4 weeks.         Start:  07/31/25    Expected End:  10/29/25            Pt will increase BLE and trunk strength to 4+/5 or greater for improved functional mobility and decreased risk of falls, in 5 weeks.         Start:  07/31/25    Expected End:  10/29/25            Pt will score </=17/50 (MCID) on Revised Oswestry, indicating improved pain and functional ability, in 5 weeks.         Start:  07/31/25    Expected End:  10/29/25            Patient states that their goal is to decrease stiffness and pain with physical therapy intervention.         Start:  07/31/25    Expected End:  10/29/25

## 2025-08-12 ENCOUNTER — CLINICAL SUPPORT (OUTPATIENT)
Facility: HOSPITAL | Age: 65
End: 2025-08-12
Payer: MEDICARE

## 2025-08-12 ENCOUNTER — HOSPITAL ENCOUNTER (OUTPATIENT)
Dept: CARDIOLOGY | Facility: HOSPITAL | Age: 65
Discharge: HOME | End: 2025-08-12
Payer: MEDICARE

## 2025-08-12 ENCOUNTER — APPOINTMENT (OUTPATIENT)
Dept: CARDIAC REHAB | Facility: HOSPITAL | Age: 65
End: 2025-08-12
Payer: MEDICARE

## 2025-08-12 VITALS
SYSTOLIC BLOOD PRESSURE: 113 MMHG | BODY MASS INDEX: 25.82 KG/M2 | HEART RATE: 70 BPM | DIASTOLIC BLOOD PRESSURE: 74 MMHG | RESPIRATION RATE: 18 BRPM | WEIGHT: 150.4 LBS | OXYGEN SATURATION: 92 %

## 2025-08-12 DIAGNOSIS — I50.20 SYSTOLIC HEART FAILURE, ACC/AHA STAGE C: ICD-10-CM

## 2025-08-12 DIAGNOSIS — I47.20 VT (VENTRICULAR TACHYCARDIA) (MULTI): Primary | ICD-10-CM

## 2025-08-12 DIAGNOSIS — Z95.810 PRESENCE OF AUTOMATIC CARDIOVERTER/DEFIBRILLATOR (AICD): ICD-10-CM

## 2025-08-12 PROCEDURE — 99212 OFFICE O/P EST SF 10 MIN: CPT

## 2025-08-12 PROCEDURE — 93283 PRGRMG EVAL IMPLANTABLE DFB: CPT

## 2025-08-13 ENCOUNTER — TREATMENT (OUTPATIENT)
Dept: PHYSICAL THERAPY | Facility: CLINIC | Age: 65
End: 2025-08-13
Payer: MEDICARE

## 2025-08-13 DIAGNOSIS — M45.0 ANKYLOSING SPONDYLITIS OF MULTIPLE SITES IN SPINE (MULTI): ICD-10-CM

## 2025-08-13 DIAGNOSIS — R26.9 ABNORMALITY OF GAIT: ICD-10-CM

## 2025-08-13 PROCEDURE — 97113 AQUATIC THERAPY/EXERCISES: CPT | Mod: GP,CQ | Performed by: PHYSICAL THERAPY ASSISTANT

## 2025-08-13 ASSESSMENT — PAIN SCALES - GENERAL: PAINLEVEL_OUTOF10: 6

## 2025-08-13 ASSESSMENT — PAIN - FUNCTIONAL ASSESSMENT: PAIN_FUNCTIONAL_ASSESSMENT: 0-10

## 2025-08-15 ENCOUNTER — TREATMENT (OUTPATIENT)
Dept: PHYSICAL THERAPY | Facility: CLINIC | Age: 65
End: 2025-08-15
Payer: MEDICARE

## 2025-08-15 DIAGNOSIS — R26.9 ABNORMALITY OF GAIT: ICD-10-CM

## 2025-08-15 DIAGNOSIS — M45.0 ANKYLOSING SPONDYLITIS OF MULTIPLE SITES IN SPINE (MULTI): ICD-10-CM

## 2025-08-15 PROCEDURE — 97113 AQUATIC THERAPY/EXERCISES: CPT | Mod: GP,CQ | Performed by: PHYSICAL THERAPY ASSISTANT

## 2025-08-15 ASSESSMENT — PAIN - FUNCTIONAL ASSESSMENT: PAIN_FUNCTIONAL_ASSESSMENT: 0-10

## 2025-08-15 ASSESSMENT — PAIN SCALES - GENERAL: PAINLEVEL_OUTOF10: 0 - NO PAIN

## 2025-08-20 ENCOUNTER — TREATMENT (OUTPATIENT)
Dept: PHYSICAL THERAPY | Facility: CLINIC | Age: 65
End: 2025-08-20
Payer: MEDICARE

## 2025-08-20 DIAGNOSIS — R26.9 ABNORMALITY OF GAIT: ICD-10-CM

## 2025-08-20 DIAGNOSIS — M45.0 ANKYLOSING SPONDYLITIS OF MULTIPLE SITES IN SPINE (MULTI): ICD-10-CM

## 2025-08-20 PROCEDURE — 97113 AQUATIC THERAPY/EXERCISES: CPT | Mod: GP,CQ | Performed by: PHYSICAL THERAPY ASSISTANT

## 2025-08-20 ASSESSMENT — PAIN SCALES - GENERAL: PAINLEVEL_OUTOF10: 5 - MODERATE PAIN

## 2025-08-20 ASSESSMENT — PAIN - FUNCTIONAL ASSESSMENT: PAIN_FUNCTIONAL_ASSESSMENT: 0-10

## 2025-08-22 ENCOUNTER — TREATMENT (OUTPATIENT)
Dept: PHYSICAL THERAPY | Facility: CLINIC | Age: 65
End: 2025-08-22
Payer: MEDICARE

## 2025-08-22 DIAGNOSIS — R26.9 ABNORMALITY OF GAIT: ICD-10-CM

## 2025-08-22 DIAGNOSIS — M45.0 ANKYLOSING SPONDYLITIS OF MULTIPLE SITES IN SPINE (MULTI): ICD-10-CM

## 2025-08-22 PROCEDURE — 97113 AQUATIC THERAPY/EXERCISES: CPT | Mod: GP,CQ | Performed by: PHYSICAL THERAPY ASSISTANT

## 2025-08-22 ASSESSMENT — PAIN - FUNCTIONAL ASSESSMENT: PAIN_FUNCTIONAL_ASSESSMENT: 0-10

## 2025-08-22 ASSESSMENT — PAIN SCALES - GENERAL: PAINLEVEL_OUTOF10: 5 - MODERATE PAIN

## 2025-08-27 ENCOUNTER — TREATMENT (OUTPATIENT)
Dept: PHYSICAL THERAPY | Facility: CLINIC | Age: 65
End: 2025-08-27
Payer: MEDICARE

## 2025-08-27 DIAGNOSIS — R26.9 ABNORMALITY OF GAIT: ICD-10-CM

## 2025-08-27 DIAGNOSIS — M45.0 ANKYLOSING SPONDYLITIS OF MULTIPLE SITES IN SPINE (MULTI): ICD-10-CM

## 2025-08-27 PROCEDURE — 97113 AQUATIC THERAPY/EXERCISES: CPT | Mod: GP,CQ | Performed by: PHYSICAL THERAPY ASSISTANT

## 2025-08-27 ASSESSMENT — PAIN - FUNCTIONAL ASSESSMENT: PAIN_FUNCTIONAL_ASSESSMENT: 0-10

## 2025-08-27 ASSESSMENT — PAIN SCALES - GENERAL: PAINLEVEL_OUTOF10: 5 - MODERATE PAIN

## 2025-08-28 ENCOUNTER — TREATMENT (OUTPATIENT)
Dept: PHYSICAL THERAPY | Facility: CLINIC | Age: 65
End: 2025-08-28
Payer: MEDICARE

## 2025-08-28 DIAGNOSIS — R26.9 ABNORMALITY OF GAIT: ICD-10-CM

## 2025-08-28 DIAGNOSIS — M45.0 ANKYLOSING SPONDYLITIS OF MULTIPLE SITES IN SPINE (MULTI): ICD-10-CM

## 2025-08-28 PROCEDURE — 97140 MANUAL THERAPY 1/> REGIONS: CPT | Mod: GP

## 2025-08-28 PROCEDURE — 97110 THERAPEUTIC EXERCISES: CPT | Mod: GP

## 2025-08-28 ASSESSMENT — PAIN - FUNCTIONAL ASSESSMENT: PAIN_FUNCTIONAL_ASSESSMENT: 0-10

## 2025-08-28 ASSESSMENT — PAIN SCALES - GENERAL: PAINLEVEL_OUTOF10: 4

## 2025-09-05 ENCOUNTER — TREATMENT (OUTPATIENT)
Dept: PHYSICAL THERAPY | Facility: CLINIC | Age: 65
End: 2025-09-05
Payer: MEDICARE

## 2025-09-05 DIAGNOSIS — R26.9 ABNORMALITY OF GAIT: ICD-10-CM

## 2025-09-05 DIAGNOSIS — M45.0 ANKYLOSING SPONDYLITIS OF MULTIPLE SITES IN SPINE (MULTI): ICD-10-CM

## 2025-09-05 ASSESSMENT — PAIN - FUNCTIONAL ASSESSMENT: PAIN_FUNCTIONAL_ASSESSMENT: 0-10

## 2025-09-05 ASSESSMENT — PAIN SCALES - GENERAL: PAINLEVEL_OUTOF10: 7

## 2025-11-11 ENCOUNTER — APPOINTMENT (OUTPATIENT)
Dept: PRIMARY CARE | Facility: CLINIC | Age: 65
End: 2025-11-11
Payer: MEDICARE

## 2025-11-18 ENCOUNTER — APPOINTMENT (OUTPATIENT)
Dept: CARDIOLOGY | Facility: CLINIC | Age: 65
End: 2025-11-18
Payer: MEDICARE